# Patient Record
Sex: FEMALE | Race: WHITE
[De-identification: names, ages, dates, MRNs, and addresses within clinical notes are randomized per-mention and may not be internally consistent; named-entity substitution may affect disease eponyms.]

---

## 2017-03-08 ENCOUNTER — APPOINTMENT (OUTPATIENT)
Dept: FAMILY MEDICINE | Facility: CLINIC | Age: 67
End: 2017-03-08

## 2017-03-08 ENCOUNTER — NON-APPOINTMENT (OUTPATIENT)
Age: 67
End: 2017-03-08

## 2017-03-08 VITALS
DIASTOLIC BLOOD PRESSURE: 62 MMHG | BODY MASS INDEX: 19.97 KG/M2 | WEIGHT: 114.13 LBS | SYSTOLIC BLOOD PRESSURE: 150 MMHG | HEIGHT: 63.5 IN

## 2017-03-08 VITALS — HEART RATE: 72 BPM | RESPIRATION RATE: 16 BRPM | DIASTOLIC BLOOD PRESSURE: 80 MMHG | SYSTOLIC BLOOD PRESSURE: 140 MMHG

## 2017-03-08 DIAGNOSIS — Z83.79 FAMILY HISTORY OF OTHER DISEASES OF THE DIGESTIVE SYSTEM: ICD-10-CM

## 2017-03-08 DIAGNOSIS — Z82.0 FAMILY HISTORY OF EPILEPSY AND OTHER DISEASES OF THE NERVOUS SYSTEM: ICD-10-CM

## 2017-03-08 RX ORDER — IPRATROPIUM BROMIDE 21 UG/1
0.03 SPRAY NASAL
Qty: 30 | Refills: 0 | Status: COMPLETED | COMMUNITY
Start: 2017-02-13

## 2017-03-08 RX ORDER — AMOXICILLIN AND CLAVULANATE POTASSIUM 875; 125 MG/1; MG/1
875-125 TABLET, COATED ORAL
Qty: 20 | Refills: 0 | Status: COMPLETED | COMMUNITY
Start: 2016-10-10

## 2017-03-08 RX ORDER — PEN NEEDLE, DIABETIC 29 G X1/2"
29G X 12.7MM NEEDLE, DISPOSABLE MISCELLANEOUS
Qty: 500 | Refills: 0 | Status: ACTIVE | COMMUNITY
Start: 2016-12-01

## 2017-05-06 LAB — HEMOCCULT STL QL IA: NEGATIVE

## 2017-10-02 ENCOUNTER — APPOINTMENT (OUTPATIENT)
Dept: FAMILY MEDICINE | Facility: CLINIC | Age: 67
End: 2017-10-02
Payer: MEDICARE

## 2017-10-02 VITALS
BODY MASS INDEX: 20.34 KG/M2 | DIASTOLIC BLOOD PRESSURE: 70 MMHG | WEIGHT: 116.25 LBS | SYSTOLIC BLOOD PRESSURE: 144 MMHG | HEIGHT: 63.5 IN

## 2017-10-02 VITALS — DIASTOLIC BLOOD PRESSURE: 80 MMHG | RESPIRATION RATE: 16 BRPM | HEART RATE: 72 BPM | SYSTOLIC BLOOD PRESSURE: 140 MMHG

## 2017-10-02 DIAGNOSIS — T75.3XXA MOTION SICKNESS, INITIAL ENCOUNTER: ICD-10-CM

## 2017-10-02 PROCEDURE — 99214 OFFICE O/P EST MOD 30 MIN: CPT | Mod: 25

## 2017-10-02 PROCEDURE — 90732 PPSV23 VACC 2 YRS+ SUBQ/IM: CPT

## 2017-10-02 PROCEDURE — 90662 IIV NO PRSV INCREASED AG IM: CPT

## 2017-10-02 PROCEDURE — G0009: CPT

## 2017-10-02 PROCEDURE — G0008: CPT

## 2018-02-23 ENCOUNTER — APPOINTMENT (OUTPATIENT)
Dept: FAMILY MEDICINE | Facility: CLINIC | Age: 68
End: 2018-02-23
Payer: MEDICARE

## 2018-02-23 VITALS
DIASTOLIC BLOOD PRESSURE: 72 MMHG | SYSTOLIC BLOOD PRESSURE: 144 MMHG | HEIGHT: 63.5 IN | HEART RATE: 87 BPM | BODY MASS INDEX: 20.3 KG/M2 | TEMPERATURE: 97.8 F | OXYGEN SATURATION: 98 % | WEIGHT: 116 LBS | RESPIRATION RATE: 16 BRPM

## 2018-02-23 PROCEDURE — 99214 OFFICE O/P EST MOD 30 MIN: CPT

## 2018-04-16 ENCOUNTER — NON-APPOINTMENT (OUTPATIENT)
Age: 68
End: 2018-04-16

## 2018-04-16 ENCOUNTER — APPOINTMENT (OUTPATIENT)
Dept: FAMILY MEDICINE | Facility: CLINIC | Age: 68
End: 2018-04-16
Payer: MEDICARE

## 2018-04-16 VITALS
HEART RATE: 77 BPM | SYSTOLIC BLOOD PRESSURE: 150 MMHG | TEMPERATURE: 97.8 F | BODY MASS INDEX: 20.54 KG/M2 | DIASTOLIC BLOOD PRESSURE: 78 MMHG | WEIGHT: 117.38 LBS | OXYGEN SATURATION: 99 % | HEIGHT: 63.5 IN

## 2018-04-16 VITALS — SYSTOLIC BLOOD PRESSURE: 140 MMHG | RESPIRATION RATE: 1 BRPM | DIASTOLIC BLOOD PRESSURE: 80 MMHG | HEART RATE: 72 BPM

## 2018-04-16 PROCEDURE — 93000 ELECTROCARDIOGRAM COMPLETE: CPT | Mod: 59

## 2018-04-16 PROCEDURE — G0439: CPT

## 2018-04-16 RX ORDER — INSULIN DETEMIR 100 [IU]/ML
100 INJECTION, SOLUTION SUBCUTANEOUS
Qty: 15 | Refills: 0 | Status: COMPLETED | COMMUNITY
Start: 2017-07-10 | End: 2018-04-16

## 2018-04-16 RX ORDER — MECLIZINE HYDROCHLORIDE 25 MG/1
25 TABLET ORAL EVERY 6 HOURS
Qty: 30 | Refills: 1 | Status: COMPLETED | COMMUNITY
Start: 2017-10-02 | End: 2018-04-16

## 2018-08-15 ENCOUNTER — APPOINTMENT (OUTPATIENT)
Dept: FAMILY MEDICINE | Facility: CLINIC | Age: 68
End: 2018-08-15

## 2018-10-15 ENCOUNTER — MED ADMIN CHARGE (OUTPATIENT)
Age: 68
End: 2018-10-15

## 2018-10-15 ENCOUNTER — APPOINTMENT (OUTPATIENT)
Dept: FAMILY MEDICINE | Facility: CLINIC | Age: 68
End: 2018-10-15
Payer: MEDICARE

## 2018-10-15 PROCEDURE — G0008: CPT

## 2018-10-15 PROCEDURE — 90662 IIV NO PRSV INCREASED AG IM: CPT

## 2018-10-20 LAB — HEMOCCULT STL QL IA: NEGATIVE

## 2018-12-13 ENCOUNTER — CLINICAL ADVICE (OUTPATIENT)
Age: 68
End: 2018-12-13

## 2019-03-12 ENCOUNTER — FORM ENCOUNTER (OUTPATIENT)
Age: 69
End: 2019-03-12

## 2019-03-13 ENCOUNTER — APPOINTMENT (OUTPATIENT)
Dept: FAMILY MEDICINE | Facility: CLINIC | Age: 69
End: 2019-03-13
Payer: MEDICARE

## 2019-03-13 ENCOUNTER — OUTPATIENT (OUTPATIENT)
Dept: OUTPATIENT SERVICES | Facility: HOSPITAL | Age: 69
LOS: 1 days | End: 2019-03-13
Payer: MEDICARE

## 2019-03-13 ENCOUNTER — APPOINTMENT (OUTPATIENT)
Dept: RADIOLOGY | Facility: CLINIC | Age: 69
End: 2019-03-13
Payer: MEDICARE

## 2019-03-13 VITALS
HEIGHT: 63.5 IN | DIASTOLIC BLOOD PRESSURE: 78 MMHG | BODY MASS INDEX: 20.39 KG/M2 | WEIGHT: 116.5 LBS | OXYGEN SATURATION: 98 % | HEART RATE: 78 BPM | SYSTOLIC BLOOD PRESSURE: 140 MMHG

## 2019-03-13 DIAGNOSIS — S89.92XA UNSPECIFIED INJURY OF LEFT LOWER LEG, INITIAL ENCOUNTER: ICD-10-CM

## 2019-03-13 PROCEDURE — 99213 OFFICE O/P EST LOW 20 MIN: CPT

## 2019-03-13 PROCEDURE — 73562 X-RAY EXAM OF KNEE 3: CPT | Mod: 26,LT

## 2019-03-13 PROCEDURE — 73562 X-RAY EXAM OF KNEE 3: CPT

## 2019-03-13 NOTE — HISTORY OF PRESENT ILLNESS
[FreeTextEntry8] : left  knee  injury  pt  fell  5 days ago has  ms and also  injured  face and developed  left  knee  swelling and pain

## 2019-05-10 ENCOUNTER — RX RENEWAL (OUTPATIENT)
Age: 69
End: 2019-05-10

## 2019-05-20 ENCOUNTER — APPOINTMENT (OUTPATIENT)
Dept: FAMILY MEDICINE | Facility: CLINIC | Age: 69
End: 2019-05-20
Payer: MEDICARE

## 2019-05-20 VITALS
SYSTOLIC BLOOD PRESSURE: 132 MMHG | WEIGHT: 115 LBS | OXYGEN SATURATION: 98 % | BODY MASS INDEX: 20.12 KG/M2 | HEART RATE: 70 BPM | HEIGHT: 63.5 IN | DIASTOLIC BLOOD PRESSURE: 80 MMHG

## 2019-05-20 VITALS — HEART RATE: 72 BPM | SYSTOLIC BLOOD PRESSURE: 130 MMHG | DIASTOLIC BLOOD PRESSURE: 70 MMHG | RESPIRATION RATE: 16 BRPM

## 2019-05-20 PROCEDURE — 99213 OFFICE O/P EST LOW 20 MIN: CPT | Mod: 25

## 2019-05-20 PROCEDURE — G0439: CPT

## 2019-05-20 PROCEDURE — G0444 DEPRESSION SCREEN ANNUAL: CPT | Mod: 59

## 2019-05-20 RX ORDER — INTERFERON BETA-1A 30 MCG
KIT INTRAMUSCULAR
Refills: 0 | Status: COMPLETED | COMMUNITY
End: 2019-05-20

## 2019-05-20 NOTE — HISTORY OF PRESENT ILLNESS
[FreeTextEntry1] : pt here  for  cpe and  management of MS DM HTN HLD  [de-identified] : ms  getting worse has  weakness in left  leg  going  to  rehab SEEING  NEW  NEURO HEADACHES  HELPED WITH FIORINAL AND SLEEPS WELL WITH AMYTRIPTYLLINE   C/O  NECK PAIN

## 2019-05-20 NOTE — PHYSICAL EXAM
[No Acute Distress] : no acute distress [Normal Voice/Communication] : normal voice/communication [PERRL] : pupils equal round and reactive to light [Normal Oropharynx] : the oropharynx was normal [Supple] : supple [Clear to Auscultation] : lungs were clear to auscultation bilaterally [Regular Rhythm] : with a regular rhythm [No Edema] : there was no peripheral edema [Soft] : abdomen soft [Non Tender] : non-tender [Non-distended] : non-distended [No HSM] : no HSM [No Rash] : no rash [Normal Insight/Judgement] : insight and judgment were intact [No Masses] : no palpable masses [No Nipple Discharge] : no nipple discharge [de-identified] : NECK  DECREASED ROM

## 2019-05-20 NOTE — REVIEW OF SYSTEMS
[Hearing Loss] : hearing loss [Fever] : no fever [Pain] : no pain [Itching] : no itching [Chest Pain] : no chest pain [Orthopnea] : no orthopnea [Shortness Of Breath] : no shortness of breath [Wheezing] : no wheezing [Abdominal Pain] : no abdominal pain [Nausea] : no nausea [Constipation] : no constipation [Incontinence] : no incontinence [Swollen Glands] : no swollen glands

## 2019-05-20 NOTE — HEALTH RISK ASSESSMENT
[Fair] :  ~his/her~ mood as fair [One fall no injury in past year] : Patient reported one fall in the past year without injury [0] : 2) Feeling down, depressed, or hopeless: Not at all (0) [None] : None [With Significant Other] : lives with significant other [College] : College [] :  [Fully functional (bathing, dressing, toileting, transferring, walking, feeding)] : Fully functional (bathing, dressing, toileting, transferring, walking, feeding) [Fully functional (using the telephone, shopping, preparing meals, housekeeping, doing laundry, using] : Fully functional and needs no help or supervision to perform IADLs (using the telephone, shopping, preparing meals, housekeeping, doing laundry, using transportation, managing medications and managing finances) [Reports changes in hearing] : Reports changes in hearing [Smoke Detector] : smoke detector [Carbon Monoxide Detector] : carbon monoxide detector [Seat Belt] :  uses seat belt [With Patient/Caregiver] : With Patient/Caregiver [] : No [de-identified] : ENDO AND  NEURO  [de-identified] : NO  [[id number]] : 0 [Change in mental status noted] : No change in mental status noted [Reports changes in vision] : Reports no changes in vision [Reports changes in dental health] : Reports no changes in dental health [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

## 2019-05-23 ENCOUNTER — FORM ENCOUNTER (OUTPATIENT)
Age: 69
End: 2019-05-23

## 2019-05-23 DIAGNOSIS — M81.0 AGE-RELATED OSTEOPOROSIS W/OUT CURRENT PATHOLOGICAL FRACTURE: ICD-10-CM

## 2019-05-24 ENCOUNTER — APPOINTMENT (OUTPATIENT)
Dept: RADIOLOGY | Facility: CLINIC | Age: 69
End: 2019-05-24
Payer: MEDICARE

## 2019-05-24 ENCOUNTER — OUTPATIENT (OUTPATIENT)
Dept: OUTPATIENT SERVICES | Facility: HOSPITAL | Age: 69
LOS: 1 days | End: 2019-05-24
Payer: MEDICARE

## 2019-05-24 DIAGNOSIS — Z00.8 ENCOUNTER FOR OTHER GENERAL EXAMINATION: ICD-10-CM

## 2019-05-24 PROCEDURE — 77080 DXA BONE DENSITY AXIAL: CPT | Mod: 26

## 2019-05-24 PROCEDURE — 77080 DXA BONE DENSITY AXIAL: CPT

## 2019-05-27 PROBLEM — M81.0 OSTEOPOROSIS: Status: ACTIVE | Noted: 2019-05-27

## 2019-07-24 ENCOUNTER — RX RENEWAL (OUTPATIENT)
Age: 69
End: 2019-07-24

## 2019-08-05 ENCOUNTER — RX RENEWAL (OUTPATIENT)
Age: 69
End: 2019-08-05

## 2019-09-12 ENCOUNTER — RX RENEWAL (OUTPATIENT)
Age: 69
End: 2019-09-12

## 2019-10-28 ENCOUNTER — RX RENEWAL (OUTPATIENT)
Age: 69
End: 2019-10-28

## 2019-11-05 ENCOUNTER — RX RENEWAL (OUTPATIENT)
Age: 69
End: 2019-11-05

## 2019-11-06 ENCOUNTER — RX RENEWAL (OUTPATIENT)
Age: 69
End: 2019-11-06

## 2019-11-27 ENCOUNTER — RX RENEWAL (OUTPATIENT)
Age: 69
End: 2019-11-27

## 2019-12-16 ENCOUNTER — RX RENEWAL (OUTPATIENT)
Age: 69
End: 2019-12-16

## 2019-12-30 ENCOUNTER — APPOINTMENT (OUTPATIENT)
Dept: NEUROLOGY | Facility: CLINIC | Age: 69
End: 2019-12-30
Payer: MEDICARE

## 2019-12-30 VITALS
DIASTOLIC BLOOD PRESSURE: 67 MMHG | HEIGHT: 63.5 IN | HEART RATE: 80 BPM | SYSTOLIC BLOOD PRESSURE: 160 MMHG | WEIGHT: 115 LBS | BODY MASS INDEX: 20.12 KG/M2

## 2019-12-30 PROCEDURE — 99205 OFFICE O/P NEW HI 60 MIN: CPT

## 2019-12-30 NOTE — HISTORY OF PRESENT ILLNESS
[FreeTextEntry1] : Reason for consult: MS\par \par HPI: TERESA MCHUGH is a 69 year old woman \par \par 1980s - diagnosed with MS based on L optic neuritis, which improved.\par 1990s - had a 2nd "attack" involving left leg weakness and gait impairment. Saw Dr. Martines, got IVMP and improved but with chronic LLE weakness. Then took avonex for 20 years. \par 3/2019 - developed "reaction" to avonex which made her LLE weaker for several days. Then recurred when she tried again, so she stopped it.\par Then stopped seeing Dr. Martines.\par Then started seeing [facility] for PT and referred to Dr. Martell.\par LLE has become gradually heavier and tighter over past several months. \par Started using cane intermittently in late 2019.\par Last MRIs in 6/2019 which reportedly were unchanged.\par Tried ampyra in late 2019 and did not tolerate it.\par \par ROS/Current Sx:\par 1 recent fall - walking up incline, walks with walker.\par no BB dysfunction\par \par PMHX:\par DM1\par MS\par \par MEDS:\par ramipril\par elavil \par amlodipine\par insulin\par butalbital 1-2x/wk\par  \par ALL: nkda\par \par SHx: no tob, no etoh. \par \par FHx: mother with seizures. no MS.\par \par Vitals: hypertensive, i advised her to discuss with her pmd.\par \par Exam:\par \par AO3.  Normally conversant.  Follows commands, names, and repeats.  Good attention.\par \par PERRL, VFF, EOMI, no nystagmus, face symmetric, TUP at midline.\par \par Motor: \par                                                 R:                               L:\par Del                                           5                                5\par Bi                                              5                               5\par Tri                                            5                               5\par Wrist Extensors                      5                               5-\par Finger abductors                    5                               5-\par                                         5                               5- \par \par HF                                           4++                              4+\par KE                                           5                               5\par KF                                           5                               5-\par DF                                           4+ (dec ROM)                              4 (dec ROM)\par PF                                           5                               5\par \par Tone                                       R                               L\par UE                                          0                                0 \par LE                                          1                                3\par \par Sensory                                RUE                      LUE                 RLE                LLE     \par LT                                           +                            +                      +                   +\par Vib                                          +                            +                     abs                   abs\par JPS                                       \par PP                                         +                            +                      +                   +\par Temp                                     +                            +                      +                   +\par \par Reflexes:\par                                              R                             L                            \par Biceps                                  3                           3\par BR                                        3                           3\par Triceps                               \par Pat                                        3                        3+\par AJ                                        3+                           3\par \par TOES                                   tonic E                           tonic E\par \par \par Coordination:\par                                              R                             L                       \par FTN                                       0                             0 \par CHAYA                                      0                            0\par HTS                                      0                             0 \par \par Other                                                                          \par  \par \par Gait: mildly wide based, ataxic, drags the LLE a bit. very poor heel elevation BL.\par \par                     Assistance: tested without.\par \par MRI brain 6/2019 c/w 7/2017 - typical appearing lesions (PV, DISHA).\par \par MRI C+T 6/2019 - multilevel t2h.\par \par \par AP: 70yo with SPMS, progressing over past 1 year.\par \par Discussed aubagio and ocrevus, risks/benefits of each including infectious and neoplastic risk. Discussed mayzent but not the best option given uncontrolled DM.\par \par - trial of aubagio\par - consider IVMP but given uncontrolled DM, will hold off.\par \par Sx:\par - baclofen 5tid, inc to 10mg tid as tolerated for LLE spasticity.\par \par - RTC 1m after starting aubagio\par \par

## 2020-01-02 LAB
25(OH)D3 SERPL-MCNC: 28 NG/ML
ALBUMIN SERPL ELPH-MCNC: 4.3 G/DL
ALP BLD-CCNC: 90 U/L
ALT SERPL-CCNC: 14 U/L
AST SERPL-CCNC: 15 U/L
BASOPHILS # BLD AUTO: 0.04 K/UL
BASOPHILS NFR BLD AUTO: 0.6 %
BILIRUB DIRECT SERPL-MCNC: 0.1 MG/DL
BILIRUB INDIRECT SERPL-MCNC: 0.1 MG/DL
BILIRUB SERPL-MCNC: 0.2 MG/DL
EOSINOPHIL # BLD AUTO: 0.44 K/UL
EOSINOPHIL NFR BLD AUTO: 6.7 %
HCT VFR BLD CALC: 40.7 %
HGB BLD-MCNC: 13.3 G/DL
IMM GRANULOCYTES NFR BLD AUTO: 0.3 %
LYMPHOCYTES # BLD AUTO: 1.95 K/UL
LYMPHOCYTES NFR BLD AUTO: 29.6 %
M TB IFN-G BLD-IMP: NEGATIVE
MAN DIFF?: NORMAL
MCHC RBC-ENTMCNC: 29.8 PG
MCHC RBC-ENTMCNC: 32.7 GM/DL
MCV RBC AUTO: 91.1 FL
MONOCYTES # BLD AUTO: 0.42 K/UL
MONOCYTES NFR BLD AUTO: 6.4 %
NEUTROPHILS # BLD AUTO: 3.71 K/UL
NEUTROPHILS NFR BLD AUTO: 56.4 %
PLATELET # BLD AUTO: 226 K/UL
PROT SERPL-MCNC: 6.5 G/DL
QUANTIFERON TB PLUS MITOGEN MINUS NIL: >10 IU/ML
QUANTIFERON TB PLUS NIL: 0.01 IU/ML
QUANTIFERON TB PLUS TB1 MINUS NIL: 0 IU/ML
QUANTIFERON TB PLUS TB2 MINUS NIL: 0 IU/ML
RBC # BLD: 4.47 M/UL
RBC # FLD: 12.2 %
WBC # FLD AUTO: 6.58 K/UL

## 2020-01-17 ENCOUNTER — RESULT REVIEW (OUTPATIENT)
Age: 70
End: 2020-01-17

## 2020-01-28 ENCOUNTER — RX RENEWAL (OUTPATIENT)
Age: 70
End: 2020-01-28

## 2020-02-05 ENCOUNTER — RX RENEWAL (OUTPATIENT)
Age: 70
End: 2020-02-05

## 2020-02-12 ENCOUNTER — APPOINTMENT (OUTPATIENT)
Dept: FAMILY MEDICINE | Facility: CLINIC | Age: 70
End: 2020-02-12
Payer: MEDICARE

## 2020-02-12 VITALS — SYSTOLIC BLOOD PRESSURE: 130 MMHG | HEART RATE: 72 BPM | RESPIRATION RATE: 16 BRPM | DIASTOLIC BLOOD PRESSURE: 70 MMHG

## 2020-02-12 VITALS
DIASTOLIC BLOOD PRESSURE: 68 MMHG | BODY MASS INDEX: 20.54 KG/M2 | HEART RATE: 84 BPM | OXYGEN SATURATION: 99 % | SYSTOLIC BLOOD PRESSURE: 132 MMHG | WEIGHT: 117.38 LBS | HEIGHT: 63.5 IN

## 2020-02-12 PROCEDURE — 99214 OFFICE O/P EST MOD 30 MIN: CPT

## 2020-02-12 RX ORDER — INSULIN DETEMIR 100 [IU]/ML
100 INJECTION, SOLUTION SUBCUTANEOUS TWICE DAILY
Qty: 1 | Refills: 0 | Status: COMPLETED | COMMUNITY
End: 2020-02-12

## 2020-02-12 NOTE — PHYSICAL EXAM
[No Acute Distress] : no acute distress [PERRL] : pupils equal round and reactive to light [Normal Oropharynx] : the oropharynx was normal [Supple] : supple [Clear to Auscultation] : lungs were clear to auscultation bilaterally [Regular Rhythm] : with a regular rhythm [No Spinal Tenderness] : no spinal tenderness [No Focal Deficits] : no focal deficits [Normal] : no rash [Normal Insight/Judgement] : insight and judgment were intact [Left Foot Was Examined] : left foot ~C was examined [] : normal [de-identified] : nail normal  [de-identified] : occ ectopy  [TWNoteComboBox4] : 0 [TWNoteComboBox3] : 0

## 2020-02-12 NOTE — HISTORY OF PRESENT ILLNESS
[FreeTextEntry6] : diabetes  is poorly  controlled  may go on pump last a1c  8  Sees Dr Kulkarni  also saw podiatry did cesilia that was 63 and referred to vascular surgery  pt needs refill on fioricet  2 x  awk has  been taking for years

## 2020-02-13 LAB
ALBUMIN SERPL ELPH-MCNC: 4.4 G/DL
ALP BLD-CCNC: 88 U/L
ALT SERPL-CCNC: 13 U/L
ANION GAP SERPL CALC-SCNC: 9 MMOL/L
AST SERPL-CCNC: 15 U/L
BASOPHILS # BLD AUTO: 0.04 K/UL
BASOPHILS NFR BLD AUTO: 0.5 %
BILIRUB SERPL-MCNC: 0.2 MG/DL
BUN SERPL-MCNC: 17 MG/DL
CALCIUM SERPL-MCNC: 10.1 MG/DL
CHLORIDE SERPL-SCNC: 98 MMOL/L
CHOLEST SERPL-MCNC: 212 MG/DL
CHOLEST/HDLC SERPL: 2.6 RATIO
CO2 SERPL-SCNC: 33 MMOL/L
CREAT SERPL-MCNC: 0.53 MG/DL
EOSINOPHIL # BLD AUTO: 0.42 K/UL
EOSINOPHIL NFR BLD AUTO: 4.9 %
ESTIMATED AVERAGE GLUCOSE: 189 MG/DL
GLUCOSE SERPL-MCNC: 213 MG/DL
HBA1C MFR BLD HPLC: 8.2 %
HCT VFR BLD CALC: 42.4 %
HDLC SERPL-MCNC: 80 MG/DL
HGB BLD-MCNC: 13.3 G/DL
IMM GRANULOCYTES NFR BLD AUTO: 0.2 %
LDLC SERPL CALC-MCNC: 113 MG/DL
LYMPHOCYTES # BLD AUTO: 1.93 K/UL
LYMPHOCYTES NFR BLD AUTO: 22.7 %
MAN DIFF?: NORMAL
MCHC RBC-ENTMCNC: 28.8 PG
MCHC RBC-ENTMCNC: 31.4 GM/DL
MCV RBC AUTO: 91.8 FL
MONOCYTES # BLD AUTO: 0.6 K/UL
MONOCYTES NFR BLD AUTO: 7 %
NEUTROPHILS # BLD AUTO: 5.51 K/UL
NEUTROPHILS NFR BLD AUTO: 64.7 %
PLATELET # BLD AUTO: 255 K/UL
POTASSIUM SERPL-SCNC: 4.5 MMOL/L
PROT SERPL-MCNC: 6.4 G/DL
RBC # BLD: 4.62 M/UL
RBC # FLD: 12.1 %
SODIUM SERPL-SCNC: 139 MMOL/L
T4 SERPL-MCNC: 5.9 UG/DL
TRIGL SERPL-MCNC: 95 MG/DL
TSH SERPL-ACNC: 1.61 UIU/ML
URATE SERPL-MCNC: 2.7 MG/DL
WBC # FLD AUTO: 8.52 K/UL

## 2020-02-20 ENCOUNTER — RESULT REVIEW (OUTPATIENT)
Age: 70
End: 2020-02-20

## 2020-02-20 ENCOUNTER — RX RENEWAL (OUTPATIENT)
Age: 70
End: 2020-02-20

## 2020-03-19 ENCOUNTER — RX RENEWAL (OUTPATIENT)
Age: 70
End: 2020-03-19

## 2020-03-25 ENCOUNTER — APPOINTMENT (OUTPATIENT)
Dept: NEUROLOGY | Facility: CLINIC | Age: 70
End: 2020-03-25

## 2020-04-01 ENCOUNTER — APPOINTMENT (OUTPATIENT)
Dept: FAMILY MEDICINE | Facility: CLINIC | Age: 70
End: 2020-04-01

## 2020-06-16 ENCOUNTER — RESULT CHARGE (OUTPATIENT)
Age: 70
End: 2020-06-16

## 2020-06-17 ENCOUNTER — APPOINTMENT (OUTPATIENT)
Dept: FAMILY MEDICINE | Facility: CLINIC | Age: 70
End: 2020-06-17
Payer: MEDICARE

## 2020-06-17 ENCOUNTER — NON-APPOINTMENT (OUTPATIENT)
Age: 70
End: 2020-06-17

## 2020-06-17 VITALS
WEIGHT: 117 LBS | HEART RATE: 79 BPM | RESPIRATION RATE: 16 BRPM | OXYGEN SATURATION: 98 % | SYSTOLIC BLOOD PRESSURE: 138 MMHG | BODY MASS INDEX: 20.47 KG/M2 | DIASTOLIC BLOOD PRESSURE: 78 MMHG | HEIGHT: 63.5 IN

## 2020-06-17 VITALS
SYSTOLIC BLOOD PRESSURE: 130 MMHG | BODY MASS INDEX: 20.47 KG/M2 | HEART RATE: 79 BPM | HEIGHT: 63.5 IN | DIASTOLIC BLOOD PRESSURE: 78 MMHG | WEIGHT: 117 LBS | RESPIRATION RATE: 16 BRPM | OXYGEN SATURATION: 98 %

## 2020-06-17 DIAGNOSIS — Z78.9 OTHER SPECIFIED HEALTH STATUS: ICD-10-CM

## 2020-06-17 DIAGNOSIS — Z12.39 ENCOUNTER FOR OTHER SCREENING FOR MALIGNANT NEOPLASM OF BREAST: ICD-10-CM

## 2020-06-17 PROCEDURE — 93000 ELECTROCARDIOGRAM COMPLETE: CPT | Mod: 59

## 2020-06-17 PROCEDURE — G0439: CPT

## 2020-06-17 RX ORDER — IBANDRONATE SODIUM 150 MG/1
150 TABLET ORAL
Qty: 3 | Refills: 1 | Status: DISCONTINUED | COMMUNITY
Start: 2019-05-27 | End: 2020-06-17

## 2020-06-17 RX ORDER — ZOSTER VACCINE RECOMBINANT, ADJUVANTED 50 MCG/0.5
50 KIT INTRAMUSCULAR
Qty: 1 | Refills: 1 | Status: DISCONTINUED | COMMUNITY
Start: 2018-10-15 | End: 2020-06-17

## 2020-06-17 RX ORDER — TERIFLUNOMIDE 14 MG/1
14 TABLET, FILM COATED ORAL
Qty: 30 | Refills: 5 | Status: DISCONTINUED | COMMUNITY
Start: 2020-03-03 | End: 2020-06-17

## 2020-06-17 RX ORDER — METHIMAZOLE 5 MG/1
5 TABLET ORAL
Qty: 45 | Refills: 0 | Status: DISCONTINUED | COMMUNITY
Start: 2017-03-06 | End: 2020-06-17

## 2020-06-17 RX ORDER — BACLOFEN 10 MG/1
10 TABLET ORAL 3 TIMES DAILY
Qty: 270 | Refills: 3 | Status: DISCONTINUED | COMMUNITY
Start: 2019-12-30 | End: 2020-06-17

## 2020-06-19 LAB
APPEARANCE: ABNORMAL
BACTERIA: NEGATIVE
BILIRUBIN URINE: NEGATIVE
BLOOD URINE: NEGATIVE
COLOR: YELLOW
CREAT SPEC-SCNC: 79 MG/DL
GLUCOSE QUALITATIVE U: NEGATIVE
HYALINE CASTS: 1 /LPF
KETONES URINE: NEGATIVE
LEUKOCYTE ESTERASE URINE: ABNORMAL
MICROALBUMIN 24H UR DL<=1MG/L-MCNC: 1.4 MG/DL
MICROALBUMIN/CREAT 24H UR-RTO: 18 MG/G
MICROSCOPIC-UA: NORMAL
NITRITE URINE: NEGATIVE
PH URINE: 7
PROTEIN URINE: NEGATIVE
RED BLOOD CELLS URINE: 2 /HPF
SPECIFIC GRAVITY URINE: 1.01
SQUAMOUS EPITHELIAL CELLS: 2 /HPF
URINE COMMENTS: NORMAL
UROBILINOGEN URINE: NORMAL
WHITE BLOOD CELLS URINE: 12 /HPF

## 2020-06-22 RX ORDER — INSULIN ASPART 100 [IU]/ML
100 INJECTION, SOLUTION INTRAVENOUS; SUBCUTANEOUS
Qty: 30 | Refills: 0 | Status: DISCONTINUED | COMMUNITY
Start: 2016-11-30 | End: 2020-06-22

## 2020-06-22 NOTE — HEALTH RISK ASSESSMENT
[Yes] : Yes [Monthly or less (1 pt)] : Monthly or less (1 point) [1 or 2 (0 pts)] : 1 or 2 (0 points) [Never (0 pts)] : Never (0 points) [No] : In the past 12 months have you used drugs other than those required for medical reasons? No [One fall no injury in past year] : Patient reported one fall in the past year without injury [0] : 2) Feeling down, depressed, or hopeless: Not at all (0) [Patient reported colonoscopy was normal] : Patient reported colonoscopy was normal [With Significant Other] : lives with significant other [Retired] : retired [# Of Children ___] : has [unfilled] children [Feels Safe at Home] : Feels safe at home [] :  [Independent] : using transportation [Some assistance needed] : shopping [Full assistance needed] : housekeeping [Reports changes in hearing] : Reports changes in hearing [Smoke Detector] : smoke detector [Reports changes in vision] : Reports changes in vision [Sunscreen] : uses sunscreen [Carbon Monoxide Detector] : carbon monoxide detector [Seat Belt] :  uses seat belt [With Patient/Caregiver] : With Patient/Caregiver [Designated Healthcare Proxy] : Designated healthcare proxy [Relationship: ___] : Relationship: [unfilled] [] : No [de-identified] : rare alcohol use [Audit-CScore] : 1 [de-identified] : diet is generally good [de-identified] : limited- goes for physical therapy  [Change in mental status noted] : No change in mental status noted [MammogramDate] : 06/19 [BoneDensityDate] : 05/19 [BoneDensityComments] : Osteopenia  [ColonoscopyDate] : 2015 [ColonoscopyComments] : Dr. Reynolds  [de-identified] : has hearing aids [de-identified] : wears glasses [FreeTextEntry8] : uses cane [AdvancecareDate] : 06/2020

## 2020-06-22 NOTE — PHYSICAL EXAM
[No Acute Distress] : no acute distress [Well Nourished] : well nourished [Well Developed] : well developed [Normal Sclera/Conjunctiva] : normal sclera/conjunctiva [Normal Oropharynx] : the oropharynx was normal [Normal Appearance] : was normal in appearance [PERRL] : pupils equal round and reactive to light [No Respiratory Distress] : no respiratory distress  [Neck Supple] : was supple [Clear to Auscultation] : lungs were clear to auscultation bilaterally [Regular Rhythm] : with a regular rhythm [Normal Rate] : normal rate  [Normal S1, S2] : normal S1 and S2 [No Murmur] : no murmur heard [No Carotid Bruits] : no carotid bruits [Soft] : abdomen soft [Non Tender] : non-tender [No Edema] : there was no peripheral edema [Normal Posterior Cervical Nodes] : no posterior cervical lymphadenopathy [Normal Anterior Cervical Nodes] : no anterior cervical lymphadenopathy [Normal Bowel Sounds] : normal bowel sounds [Alert and Oriented x3] : oriented to person, place, and time [No Spinal Tenderness] : no spinal tenderness [No Rash] : no rash [de-identified] : hearing aids [de-identified] : decreased bilateral lower extremity strength [de-identified] : unsteady gait

## 2020-06-22 NOTE — ASSESSMENT
[FreeTextEntry1] : reports recent blood work done recently with endocrinology \par follow up with optometry/ophthalmology and dentist for routine exams (when due and able to go)\par go for mammogram \par follow up with GYN\par up to date with colonoscopy- f/u with GI when due for repeat colonoscopy \par up to date with Pneumovax\par reports up to date with Shingriix vaccinations \par \par Diabetes- c/w management as per endocrinology \par \par HTN- stable, c/w Amlodipine, Ramipril \par \par MS- c/w management as per neurology \par

## 2020-06-22 NOTE — HISTORY OF PRESENT ILLNESS
[de-identified] : \par 69 year old female presents for annual physical. \par \par Has MS for over 20 years\par follows with Dr. Serrano- neurology\par gait has been worsening- goes for physical therapy\par \par Type 1 Diabetic- follows with endocrinology, Dr. Kulkarni \par on Tresiba daily, Novolog \par follows with podiatry\par follows with ophthalmology- Dr. Ling \par \par has Osteopenia\par \par has thyroid nodules- had biopsy done, due for follow up ultrasound next month\par \par reports receiving Shingrix vaccinations at [facility] [FreeTextEntry1] : Annual physical

## 2020-06-23 ENCOUNTER — APPOINTMENT (OUTPATIENT)
Dept: VASCULAR SURGERY | Facility: CLINIC | Age: 70
End: 2020-06-23

## 2020-06-24 ENCOUNTER — INPATIENT (INPATIENT)
Facility: HOSPITAL | Age: 70
LOS: 1 days | Discharge: HOME | End: 2020-06-26
Payer: MEDICARE

## 2020-06-24 PROCEDURE — 99285 EMERGENCY DEPT VISIT HI MDM: CPT | Mod: CS

## 2020-06-24 PROCEDURE — 71045 X-RAY EXAM CHEST 1 VIEW: CPT | Mod: 26

## 2020-07-08 ENCOUNTER — APPOINTMENT (OUTPATIENT)
Dept: FAMILY MEDICINE | Facility: CLINIC | Age: 70
End: 2020-07-08
Payer: MEDICARE

## 2020-07-08 VITALS
WEIGHT: 118.13 LBS | DIASTOLIC BLOOD PRESSURE: 80 MMHG | OXYGEN SATURATION: 96 % | HEART RATE: 74 BPM | BODY MASS INDEX: 20.93 KG/M2 | HEIGHT: 63 IN | SYSTOLIC BLOOD PRESSURE: 138 MMHG

## 2020-07-08 VITALS — HEART RATE: 72 BPM | SYSTOLIC BLOOD PRESSURE: 154 MMHG | DIASTOLIC BLOOD PRESSURE: 80 MMHG | RESPIRATION RATE: 16 BRPM

## 2020-07-08 DIAGNOSIS — E04.1 NONTOXIC SINGLE THYROID NODULE: ICD-10-CM

## 2020-07-08 PROCEDURE — 99214 OFFICE O/P EST MOD 30 MIN: CPT | Mod: 25

## 2020-07-08 PROCEDURE — 36415 COLL VENOUS BLD VENIPUNCTURE: CPT

## 2020-07-08 NOTE — HISTORY OF PRESENT ILLNESS
[FreeTextEntry1] : U  [de-identified] : WAS  ADMITTED  TO [facility] FOR 3  DAYS  OF HIGH DOES  STEROID FOR MS  STEROID  DID NOT HELP GLUCOSE HAS  BEEN ELEVATED  ALSO LOOSING HAIR  SSEING  GARCES FOR  THYROID ATYPIA

## 2020-07-08 NOTE — ASSESSMENT
[FreeTextEntry1] : MS  NOT  IN REMISSION REFER  TO  [facility] REFERRAL LINE  FOR NEURO DM CHECK LABS  BP ACCEPTABLE THYROID   DISORDER F/U  Sammy

## 2020-07-08 NOTE — REVIEW OF SYSTEMS
[Muscle Weakness] : muscle weakness [Hair Changes] : hair changes [Fever] : no fever [Constipation] : no constipation [Diarrhea] : diarrhea [FreeTextEntry9] : LEFT  LEG  WEAKNESS

## 2020-07-08 NOTE — PHYSICAL EXAM
[PERRL] : pupils equal round and reactive to light [No Acute Distress] : no acute distress [Normal TMs] : both tympanic membranes were normal [Clear to Auscultation] : lungs were clear to auscultation bilaterally [Regular Rhythm] : with a regular rhythm [Supple] : supple [Normal] : no posterior cervical lymphadenopathy and no anterior cervical lymphadenopathy [No Rash] : no rash [de-identified] : LEFT LEG WEAKNESS  [de-identified] : SHUFFLING  WIDE BASED GAIT

## 2020-07-09 LAB
ALBUMIN SERPL ELPH-MCNC: 4.8 G/DL
ALP BLD-CCNC: 100 U/L
ALT SERPL-CCNC: 13 U/L
ANION GAP SERPL CALC-SCNC: 13 MMOL/L
APPEARANCE: CLEAR
AST SERPL-CCNC: 17 U/L
BACTERIA: NEGATIVE
BASOPHILS # BLD AUTO: 0.05 K/UL
BASOPHILS NFR BLD AUTO: 0.5 %
BILIRUB SERPL-MCNC: 0.2 MG/DL
BILIRUBIN URINE: NEGATIVE
BLOOD URINE: NEGATIVE
BUN SERPL-MCNC: 14 MG/DL
CALCIUM SERPL-MCNC: 9.8 MG/DL
CHLORIDE SERPL-SCNC: 98 MMOL/L
CHOLEST SERPL-MCNC: 204 MG/DL
CHOLEST/HDLC SERPL: 2.4 RATIO
CO2 SERPL-SCNC: 31 MMOL/L
COLOR: COLORLESS
CREAT SERPL-MCNC: 0.56 MG/DL
CREAT SPEC-SCNC: 13 MG/DL
EOSINOPHIL # BLD AUTO: 0.32 K/UL
EOSINOPHIL NFR BLD AUTO: 3.2 %
ESTIMATED AVERAGE GLUCOSE: 177 MG/DL
GLUCOSE QUALITATIVE U: NEGATIVE
GLUCOSE SERPL-MCNC: 196 MG/DL
HBA1C MFR BLD HPLC: 7.8 %
HCT VFR BLD CALC: 45.8 %
HDLC SERPL-MCNC: 84 MG/DL
HGB BLD-MCNC: 14.2 G/DL
HYALINE CASTS: 1 /LPF
IMM GRANULOCYTES NFR BLD AUTO: 0.1 %
KETONES URINE: NEGATIVE
LDLC SERPL CALC-MCNC: 96 MG/DL
LEUKOCYTE ESTERASE URINE: NEGATIVE
LYMPHOCYTES # BLD AUTO: 2.2 K/UL
LYMPHOCYTES NFR BLD AUTO: 22 %
MAN DIFF?: NORMAL
MCHC RBC-ENTMCNC: 29.3 PG
MCHC RBC-ENTMCNC: 31 GM/DL
MCV RBC AUTO: 94.6 FL
MICROALBUMIN 24H UR DL<=1MG/L-MCNC: <1.2 MG/DL
MICROALBUMIN/CREAT 24H UR-RTO: NORMAL MG/G
MICROSCOPIC-UA: NORMAL
MONOCYTES # BLD AUTO: 0.81 K/UL
MONOCYTES NFR BLD AUTO: 8.1 %
NEUTROPHILS # BLD AUTO: 6.59 K/UL
NEUTROPHILS NFR BLD AUTO: 66.1 %
NITRITE URINE: NEGATIVE
PH URINE: 7
PLATELET # BLD AUTO: 264 K/UL
POTASSIUM SERPL-SCNC: 4.7 MMOL/L
PROT SERPL-MCNC: 7 G/DL
PROTEIN URINE: NEGATIVE
RBC # BLD: 4.84 M/UL
RBC # FLD: 13 %
RED BLOOD CELLS URINE: 1 /HPF
SODIUM SERPL-SCNC: 141 MMOL/L
SPECIFIC GRAVITY URINE: 1.01
SQUAMOUS EPITHELIAL CELLS: 1 /HPF
T4 SERPL-MCNC: 6.3 UG/DL
TRIGL SERPL-MCNC: 123 MG/DL
TSH SERPL-ACNC: 1.89 UIU/ML
URATE SERPL-MCNC: 3 MG/DL
UROBILINOGEN URINE: NORMAL
WBC # FLD AUTO: 9.98 K/UL
WHITE BLOOD CELLS URINE: 1 /HPF

## 2020-07-31 ENCOUNTER — APPOINTMENT (OUTPATIENT)
Dept: NEUROLOGY | Facility: CLINIC | Age: 70
End: 2020-07-31

## 2020-08-11 ENCOUNTER — RX RENEWAL (OUTPATIENT)
Age: 70
End: 2020-08-11

## 2020-10-05 ENCOUNTER — MED ADMIN CHARGE (OUTPATIENT)
Age: 70
End: 2020-10-05

## 2020-10-05 ENCOUNTER — APPOINTMENT (OUTPATIENT)
Dept: FAMILY MEDICINE | Facility: CLINIC | Age: 70
End: 2020-10-05
Payer: MEDICARE

## 2020-10-05 PROCEDURE — 90662 IIV NO PRSV INCREASED AG IM: CPT

## 2020-10-05 PROCEDURE — G0008: CPT

## 2020-10-21 ENCOUNTER — RX RENEWAL (OUTPATIENT)
Age: 70
End: 2020-10-21

## 2020-12-07 ENCOUNTER — RX RENEWAL (OUTPATIENT)
Age: 70
End: 2020-12-07

## 2020-12-23 ENCOUNTER — RX RENEWAL (OUTPATIENT)
Age: 70
End: 2020-12-23

## 2021-02-06 ENCOUNTER — RX RENEWAL (OUTPATIENT)
Age: 71
End: 2021-02-06

## 2021-03-15 ENCOUNTER — RX RENEWAL (OUTPATIENT)
Age: 71
End: 2021-03-15

## 2021-03-26 ENCOUNTER — RX RENEWAL (OUTPATIENT)
Age: 71
End: 2021-03-26

## 2021-04-07 ENCOUNTER — LABORATORY RESULT (OUTPATIENT)
Age: 71
End: 2021-04-07

## 2021-04-07 ENCOUNTER — APPOINTMENT (OUTPATIENT)
Dept: FAMILY MEDICINE | Facility: CLINIC | Age: 71
End: 2021-04-07
Payer: MEDICARE

## 2021-04-07 VITALS — SYSTOLIC BLOOD PRESSURE: 130 MMHG | HEART RATE: 76 BPM | RESPIRATION RATE: 16 BRPM | DIASTOLIC BLOOD PRESSURE: 80 MMHG

## 2021-04-07 VITALS
BODY MASS INDEX: 20.91 KG/M2 | TEMPERATURE: 96.9 F | SYSTOLIC BLOOD PRESSURE: 136 MMHG | HEIGHT: 63 IN | HEART RATE: 82 BPM | DIASTOLIC BLOOD PRESSURE: 80 MMHG | OXYGEN SATURATION: 98 % | WEIGHT: 118 LBS

## 2021-04-07 DIAGNOSIS — R23.1 PALLOR: ICD-10-CM

## 2021-04-07 PROCEDURE — 99214 OFFICE O/P EST MOD 30 MIN: CPT

## 2021-04-07 RX ORDER — ALPRAZOLAM 0.25 MG/1
0.25 TABLET ORAL DAILY
Qty: 30 | Refills: 0 | Status: COMPLETED | COMMUNITY
Start: 2018-04-16 | End: 2021-04-07

## 2021-04-07 RX ORDER — INSULIN ASPART 100 [IU]/ML
100 INJECTION, SOLUTION INTRAVENOUS; SUBCUTANEOUS
Refills: 0 | Status: ACTIVE | COMMUNITY
Start: 2021-04-07

## 2021-04-07 NOTE — PHYSICAL EXAM
[No Acute Distress] : no acute distress [PERRL] : pupils equal round and reactive to light [Normal Oropharynx] : the oropharynx was normal [Supple] : supple [Clear to Auscultation] : lungs were clear to auscultation bilaterally [Normal S1, S2] : normal S1 and S2 [No Edema] : there was no peripheral edema [Normal] : soft, non-tender, non-distended, no masses palpated, no HSM and normal bowel sounds [Normal Supraclavicular Nodes] : no supraclavicular lymphadenopathy [Normal Posterior Cervical Nodes] : no posterior cervical lymphadenopathy [Normal Anterior Cervical Nodes] : no anterior cervical lymphadenopathy [No Joint Swelling] : no joint swelling [No Rash] : no rash [Speech Grossly Normal] : speech grossly normal [Normal Affect] : the affect was normal [Normal Insight/Judgement] : insight and judgment were intact [de-identified] : RETICULAR  RASH LEFT LE

## 2021-04-07 NOTE — ASSESSMENT
[FreeTextEntry1] : MS  EXACERBATION LIVEDO RETICULARIS VASCULITIS  LABS HTN AT GOAL HLD  AND DM lab evaluation  START ASA 81 \par

## 2021-04-07 NOTE — HISTORY OF PRESENT ILLNESS
[Diabetes Mellitus] : Diabetes Mellitus [Hyperlipidemia] : Hyperlipidemia [Hypertension] : Hypertension [Most Recent A1C: ___] : Most recent A1C was [unfilled] [Near target goal] : A1C near target goal [Does not check BP] : The patient is not checking blood pressure [<140/90] : Target blood pressure is <140/90 [Target goal met] : BP target goal met [Doing Well] : Patient is doing well [Low Intensity Therapy] : Patient is currently on low intensity statin  therapy [FreeTextEntry6] : glucose has been high MS  GETTING WORSE GOING TO PT SEES  NEURO STARTING PLEGRATITY INJECTIONS HAS  RETICULAR RASH ON LOWER RT LOWER  EXTREMITY  [de-identified] : sees  endo  [FreeTextEntry1] : goes to PT 2 x a wk for MS

## 2021-04-08 LAB
ALBUMIN SERPL ELPH-MCNC: 4.7 G/DL
ALP BLD-CCNC: 107 U/L
ALT SERPL-CCNC: 16 U/L
ANION GAP SERPL CALC-SCNC: 13 MMOL/L
APPEARANCE: CLEAR
AST SERPL-CCNC: 17 U/L
B BURGDOR IGG+IGM SER QL IB: NORMAL
BACTERIA: NEGATIVE
BASOPHILS # BLD AUTO: 0.04 K/UL
BASOPHILS NFR BLD AUTO: 0.5 %
BILIRUB SERPL-MCNC: 0.3 MG/DL
BILIRUBIN URINE: NEGATIVE
BLOOD URINE: NEGATIVE
BUN SERPL-MCNC: 13 MG/DL
C3 SERPL-MCNC: 128 MG/DL
CALCIUM SERPL-MCNC: 9.9 MG/DL
CCP AB SER IA-ACNC: <8 UNITS
CHLORIDE SERPL-SCNC: 101 MMOL/L
CHOLEST SERPL-MCNC: 212 MG/DL
CO2 SERPL-SCNC: 30 MMOL/L
COLOR: NORMAL
CREAT SERPL-MCNC: 0.49 MG/DL
CREAT SPEC-SCNC: 55 MG/DL
EOSINOPHIL # BLD AUTO: 0.26 K/UL
EOSINOPHIL NFR BLD AUTO: 3.3 %
ERYTHROCYTE [SEDIMENTATION RATE] IN BLOOD BY WESTERGREN METHOD: 7 MM/HR
ESTIMATED AVERAGE GLUCOSE: 169 MG/DL
GLUCOSE QUALITATIVE U: NEGATIVE
GLUCOSE SERPL-MCNC: 109 MG/DL
HBA1C MFR BLD HPLC: 7.5 %
HCT VFR BLD CALC: 45.1 %
HDLC SERPL-MCNC: 86 MG/DL
HGB BLD-MCNC: 14.3 G/DL
HYALINE CASTS: 1 /LPF
IMM GRANULOCYTES NFR BLD AUTO: 0.3 %
KETONES URINE: NEGATIVE
LDLC SERPL CALC-MCNC: 108 MG/DL
LEUKOCYTE ESTERASE URINE: NEGATIVE
LYMPHOCYTES # BLD AUTO: 1.6 K/UL
LYMPHOCYTES NFR BLD AUTO: 20.5 %
MAN DIFF?: NORMAL
MCHC RBC-ENTMCNC: 28.8 PG
MCHC RBC-ENTMCNC: 31.7 GM/DL
MCV RBC AUTO: 90.9 FL
MICROALBUMIN 24H UR DL<=1MG/L-MCNC: <1.2 MG/DL
MICROALBUMIN/CREAT 24H UR-RTO: NORMAL MG/G
MICROSCOPIC-UA: NORMAL
MONOCYTES # BLD AUTO: 0.71 K/UL
MONOCYTES NFR BLD AUTO: 9.1 %
NEUTROPHILS # BLD AUTO: 5.18 K/UL
NEUTROPHILS NFR BLD AUTO: 66.3 %
NITRITE URINE: NEGATIVE
NONHDLC SERPL-MCNC: 125 MG/DL
PH URINE: 7
PLATELET # BLD AUTO: 253 K/UL
POTASSIUM SERPL-SCNC: 4.2 MMOL/L
PROT SERPL-MCNC: 7.2 G/DL
PROTEIN URINE: NEGATIVE
RBC # BLD: 4.96 M/UL
RBC # FLD: 12.6 %
RED BLOOD CELLS URINE: 1 /HPF
RF+CCP IGG SER-IMP: NEGATIVE
RHEUMATOID FACT SER QL: <10 IU/ML
SODIUM SERPL-SCNC: 144 MMOL/L
SPECIFIC GRAVITY URINE: 1.01
SQUAMOUS EPITHELIAL CELLS: 1 /HPF
T4 SERPL-MCNC: 6.5 UG/DL
TRIGL SERPL-MCNC: 85 MG/DL
TSH SERPL-ACNC: 2.64 UIU/ML
URATE SERPL-MCNC: 2.8 MG/DL
UROBILINOGEN URINE: NORMAL
WBC # FLD AUTO: 7.81 K/UL
WHITE BLOOD CELLS URINE: 2 /HPF

## 2021-04-10 LAB — ANA SER IF-ACNC: NEGATIVE

## 2021-04-12 ENCOUNTER — RX RENEWAL (OUTPATIENT)
Age: 71
End: 2021-04-12

## 2021-04-28 ENCOUNTER — APPOINTMENT (OUTPATIENT)
Dept: DERMATOLOGY | Facility: CLINIC | Age: 71
End: 2021-04-28

## 2021-10-06 ENCOUNTER — APPOINTMENT (OUTPATIENT)
Dept: FAMILY MEDICINE | Facility: CLINIC | Age: 71
End: 2021-10-06

## 2021-10-26 ENCOUNTER — RX RENEWAL (OUTPATIENT)
Age: 71
End: 2021-10-26

## 2021-10-27 ENCOUNTER — APPOINTMENT (OUTPATIENT)
Dept: FAMILY MEDICINE | Facility: CLINIC | Age: 71
End: 2021-10-27
Payer: MEDICARE

## 2021-10-27 VITALS
HEIGHT: 63 IN | BODY MASS INDEX: 21.26 KG/M2 | OXYGEN SATURATION: 98 % | TEMPERATURE: 97.6 F | SYSTOLIC BLOOD PRESSURE: 130 MMHG | WEIGHT: 120 LBS | DIASTOLIC BLOOD PRESSURE: 78 MMHG | HEART RATE: 80 BPM

## 2021-10-27 VITALS — RESPIRATION RATE: 16 BRPM | DIASTOLIC BLOOD PRESSURE: 80 MMHG | SYSTOLIC BLOOD PRESSURE: 140 MMHG | HEART RATE: 80 BPM

## 2021-10-27 LAB
BILIRUB UR QL STRIP: NEGATIVE
GLUCOSE UR-MCNC: 500
HCG UR QL: 0.2 EU/DL
HGB UR QL STRIP.AUTO: NORMAL
KETONES UR-MCNC: NEGATIVE
LEUKOCYTE ESTERASE UR QL STRIP: NORMAL
NITRITE UR QL STRIP: NEGATIVE
PH UR STRIP: 6
PROT UR STRIP-MCNC: NEGATIVE
SP GR UR STRIP: 1.01

## 2021-10-27 PROCEDURE — 99214 OFFICE O/P EST MOD 30 MIN: CPT | Mod: 25

## 2021-10-27 PROCEDURE — 81003 URINALYSIS AUTO W/O SCOPE: CPT | Mod: QW

## 2021-10-27 RX ORDER — PITAVASTATIN CALCIUM 2.09 MG/1
2 TABLET, FILM COATED ORAL
Qty: 90 | Refills: 1 | Status: COMPLETED | COMMUNITY
Start: 2020-06-17 | End: 2021-10-27

## 2021-10-27 RX ORDER — ATORVASTATIN CALCIUM 10 MG/1
10 TABLET, FILM COATED ORAL
Qty: 90 | Refills: 1 | Status: ACTIVE | COMMUNITY
Start: 2021-10-27

## 2021-10-27 RX ORDER — PEN NEEDLE, DIABETIC 29 G X1/2"
29G X 12.7MM NEEDLE, DISPOSABLE MISCELLANEOUS
Qty: 180 | Refills: 3 | Status: ACTIVE | COMMUNITY
Start: 2021-10-27 | End: 1900-01-01

## 2021-10-27 NOTE — ASSESSMENT
[FreeTextEntry1] : polyuria  increase  treseba to 10 un in am continue sliding scale recent episode of confusion and  slurred speech may be tia will restart asa bp acceptable with amlodipine carotid us novolog not seem to be helping will use  linger needle

## 2021-10-27 NOTE — PHYSICAL EXAM
[No Acute Distress] : no acute distress [Normal Oropharynx] : the oropharynx was normal [Supple] : supple [Clear to Auscultation] : lungs were clear to auscultation bilaterally [Regular Rhythm] : with a regular rhythm [No Edema] : there was no peripheral edema [Normal] : no posterior cervical lymphadenopathy and no anterior cervical lymphadenopathy [No Spinal Tenderness] : no spinal tenderness [No Focal Deficits] : no focal deficits [Normal Insight/Judgement] : insight and judgment were intact

## 2021-10-27 NOTE — HISTORY OF PRESENT ILLNESS
[Diabetes Mellitus] : Diabetes Mellitus [Hyperlipidemia] : Hyperlipidemia [Hypertension] : Hypertension [Spouse] : spouse [Most Recent A1C: ___] : Most recent A1C was [unfilled] [Does not check BP] : The patient is not checking blood pressure [<140/90] : Target blood pressure is <140/90 [Target goal met] : BP target goal met [Doing Well] : Patient is doing well [Low Intensity Therapy] : Patient is currently on low intensity statin  therapy [FreeTextEntry6] : glucose has been high MS  GETTING WORSE GOING TO PT SEES  NEURO STARTING PLEGRATITY INJECtions had  bad reaction no longer taking wants to try avonex has  had increase in urination and 5 min episode of slurred  speech and disorientation glucose has  been high glucose has been high using  dxcom 6  waking up in am with high glucose taking  7 u of tresba  in am taking  sliding scale of novolog hasd  been more forgetful as of late  [de-identified] : sees  endo  [FreeTextEntry1] : goes to PT 2 x a wk for MS

## 2021-11-02 LAB — BACTERIA UR CULT: ABNORMAL

## 2021-11-03 ENCOUNTER — OUTPATIENT (OUTPATIENT)
Dept: OUTPATIENT SERVICES | Facility: HOSPITAL | Age: 71
LOS: 1 days | End: 2021-11-03

## 2021-11-03 ENCOUNTER — APPOINTMENT (OUTPATIENT)
Dept: MRI IMAGING | Facility: CLINIC | Age: 71
End: 2021-11-03
Payer: MEDICARE

## 2021-11-03 ENCOUNTER — APPOINTMENT (OUTPATIENT)
Dept: ULTRASOUND IMAGING | Facility: CLINIC | Age: 71
End: 2021-11-03
Payer: MEDICARE

## 2021-11-03 DIAGNOSIS — G45.9 TRANSIENT CEREBRAL ISCHEMIC ATTACK, UNSPECIFIED: ICD-10-CM

## 2021-11-03 PROCEDURE — 70553 MRI BRAIN STEM W/O & W/DYE: CPT | Mod: 26,MG

## 2021-11-03 PROCEDURE — G1004: CPT

## 2021-11-03 PROCEDURE — 93880 EXTRACRANIAL BILAT STUDY: CPT | Mod: 26

## 2021-11-17 ENCOUNTER — APPOINTMENT (OUTPATIENT)
Dept: FAMILY MEDICINE | Facility: CLINIC | Age: 71
End: 2021-11-17
Payer: MEDICARE

## 2021-11-17 VITALS
TEMPERATURE: 97.4 F | OXYGEN SATURATION: 98 % | DIASTOLIC BLOOD PRESSURE: 88 MMHG | BODY MASS INDEX: 21.26 KG/M2 | WEIGHT: 120 LBS | SYSTOLIC BLOOD PRESSURE: 138 MMHG | HEART RATE: 78 BPM | HEIGHT: 63 IN

## 2021-11-17 VITALS — RESPIRATION RATE: 16 BRPM | HEART RATE: 72 BPM | SYSTOLIC BLOOD PRESSURE: 160 MMHG | DIASTOLIC BLOOD PRESSURE: 80 MMHG

## 2021-11-17 LAB — GLUCOSE BLDC GLUCOMTR-MCNC: 256

## 2021-11-17 PROCEDURE — 82962 GLUCOSE BLOOD TEST: CPT

## 2021-11-17 PROCEDURE — 99214 OFFICE O/P EST MOD 30 MIN: CPT

## 2021-11-17 NOTE — HISTORY OF PRESENT ILLNESS
[Diabetes Mellitus] : Diabetes Mellitus [Hyperlipidemia] : Hyperlipidemia [Hypertension] : Hypertension [Spouse] : spouse [Most Recent A1C: ___] : Most recent A1C was [unfilled] [Does not check BP] : The patient is not checking blood pressure [<140/90] : Target blood pressure is <140/90 [Target goal met] : BP target goal met [Doing Well] : Patient is doing well [Low Intensity Therapy] : Patient is currently on low intensity statin  therapy [FreeTextEntry6] : pt  seeing new neuro had mri of c\par spine thoracic spine and and  brain \par doing well with tresiba  [de-identified] : sees  endo  [FreeTextEntry1] : goes to PT 2 x a wk for MS

## 2021-11-17 NOTE — ASSESSMENT
Milton Rincon called and was looking to speak with a nurse about getting her imaging switched to the [place] location rather than going to [place]. [FreeTextEntry1] : DM  SEES  ENDO CONTINUE TRESEBA HTN CONTINUE AMLODIPINE HLD CONTINUE  ATORVASTATIN BP  BORDERLINE IF ;EVATED  NEST TIME WILL ADJUST MEDS BUT DID NOT TAKE MEDS TODAY

## 2021-12-08 ENCOUNTER — APPOINTMENT (OUTPATIENT)
Dept: FAMILY MEDICINE | Facility: CLINIC | Age: 71
End: 2021-12-08

## 2021-12-16 ENCOUNTER — RX RENEWAL (OUTPATIENT)
Age: 71
End: 2021-12-16

## 2022-01-05 ENCOUNTER — APPOINTMENT (OUTPATIENT)
Dept: FAMILY MEDICINE | Facility: CLINIC | Age: 72
End: 2022-01-05
Payer: MEDICARE

## 2022-01-05 VITALS — RESPIRATION RATE: 16 BRPM | HEART RATE: 72 BPM | SYSTOLIC BLOOD PRESSURE: 150 MMHG | DIASTOLIC BLOOD PRESSURE: 80 MMHG

## 2022-01-05 LAB
BILIRUB UR QL STRIP: NEGATIVE
GLUCOSE UR-MCNC: 1000
HCG UR QL: 0.2 EU/DL
HGB UR QL STRIP.AUTO: NORMAL
KETONES UR-MCNC: NEGATIVE
LEUKOCYTE ESTERASE UR QL STRIP: NORMAL
NITRITE UR QL STRIP: NEGATIVE
PH UR STRIP: 7
PROT UR STRIP-MCNC: NEGATIVE
SP GR UR STRIP: 1.02

## 2022-01-05 PROCEDURE — 99497 ADVNCD CARE PLAN 30 MIN: CPT

## 2022-01-05 PROCEDURE — 81003 URINALYSIS AUTO W/O SCOPE: CPT | Mod: QW

## 2022-01-05 PROCEDURE — G0439: CPT

## 2022-01-05 PROCEDURE — G0444 DEPRESSION SCREEN ANNUAL: CPT | Mod: 59

## 2022-01-05 PROCEDURE — 99214 OFFICE O/P EST MOD 30 MIN: CPT | Mod: 25

## 2022-01-05 RX ORDER — INSULIN ASPART 100 [IU]/ML
100 INJECTION, SOLUTION INTRAVENOUS; SUBCUTANEOUS
Qty: 40 | Refills: 0 | Status: ACTIVE | COMMUNITY
Start: 2020-05-05

## 2022-01-05 NOTE — ASSESSMENT
[FreeTextEntry1] : DM  POOR CONTROLL STILL HAS GLUCOSURIA INCREASE TRESIBA  TO 20 UN HS  CONTINUE COVERAGE WITH NOVOLOG 6 UN TID HLS CONEINUE ATORVASTATIN BP BOEDERLINE WILL MONITOR CONTINUE AMLODIPINE AND  PAMIPRIL lab evaluation\par

## 2022-01-05 NOTE — HEALTH RISK ASSESSMENT
[Fair] :  ~his/her~ mood as fair [Never] : Never [No] : No [0] : 2) Feeling down, depressed, or hopeless: Not at all (0) [PHQ-2 Negative - No further assessment needed] : PHQ-2 Negative - No further assessment needed [With Family] : lives with family [College] : College [] :  [# Of Children ___] : has [unfilled] children [Fully functional (bathing, dressing, toileting, transferring, walking, feeding)] : Fully functional (bathing, dressing, toileting, transferring, walking, feeding) [Fully functional (using the telephone, shopping, preparing meals, housekeeping, doing laundry, using] : Fully functional and needs no help or supervision to perform IADLs (using the telephone, shopping, preparing meals, housekeeping, doing laundry, using transportation, managing medications and managing finances) [Smoke Detector] : smoke detector [Carbon Monoxide Detector] : carbon monoxide detector [Seat Belt] :  uses seat belt [With Patient/Caregiver] : , with patient/caregiver [Name: ___] : Health Care Proxy's Name: [unfilled]  [Relationship: ___] : Relationship: [unfilled] [de-identified] : NEURO  [de-identified] : BILLIE  [[id number]] : 0  [Change in mental status noted] : No change in mental status noted [Language] : denies difficulty with language [Reports changes in hearing] : Reports no changes in hearing [Reports changes in vision] : Reports no changes in vision [Reports changes in dental health] : Reports no changes in dental health [ColonoscopyDate] : 1/15 [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

## 2022-01-05 NOTE — HISTORY OF PRESENT ILLNESS
[FreeTextEntry1] : pt here for cpe and management of HTN DM AND HLD MS  [de-identified] : STARTED ON ABAGIO FOR MS

## 2022-01-05 NOTE — PHYSICAL EXAM
[No Acute Distress] : no acute distress [Normal Oropharynx] : the oropharynx was normal [Supple] : supple [Clear to Auscultation] : lungs were clear to auscultation bilaterally [Regular Rhythm] : with a regular rhythm [No Edema] : there was no peripheral edema [Normal] : no posterior cervical lymphadenopathy and no anterior cervical lymphadenopathy [No Spinal Tenderness] : no spinal tenderness [No Joint Swelling] : no joint swelling [No Focal Deficits] : no focal deficits [Normal Insight/Judgement] : insight and judgment were intact [de-identified] : CANE

## 2022-01-06 LAB
ALBUMIN SERPL ELPH-MCNC: 4.4 G/DL
ALP BLD-CCNC: 118 U/L
ALT SERPL-CCNC: 16 U/L
ANION GAP SERPL CALC-SCNC: 12 MMOL/L
APPEARANCE: CLEAR
AST SERPL-CCNC: 17 U/L
BACTERIA: NEGATIVE
BASOPHILS # BLD AUTO: 0.03 K/UL
BASOPHILS NFR BLD AUTO: 0.4 %
BILIRUB SERPL-MCNC: 0.3 MG/DL
BILIRUBIN URINE: NEGATIVE
BLOOD URINE: NEGATIVE
BUN SERPL-MCNC: 15 MG/DL
CALCIUM SERPL-MCNC: 9.4 MG/DL
CHLORIDE SERPL-SCNC: 100 MMOL/L
CHOLEST SERPL-MCNC: 209 MG/DL
CO2 SERPL-SCNC: 28 MMOL/L
COLOR: NORMAL
CREAT SERPL-MCNC: 0.55 MG/DL
CREAT SPEC-SCNC: 40 MG/DL
EOSINOPHIL # BLD AUTO: 0.18 K/UL
EOSINOPHIL NFR BLD AUTO: 2.5 %
ESTIMATED AVERAGE GLUCOSE: 177 MG/DL
GLUCOSE QUALITATIVE U: ABNORMAL
GLUCOSE SERPL-MCNC: 361 MG/DL
HBA1C MFR BLD HPLC: 7.8 %
HCT VFR BLD CALC: 41.7 %
HDLC SERPL-MCNC: 80 MG/DL
HGB BLD-MCNC: 13.4 G/DL
HYALINE CASTS: 0 /LPF
IMM GRANULOCYTES NFR BLD AUTO: 0.1 %
KETONES URINE: NEGATIVE
LDLC SERPL CALC-MCNC: 110 MG/DL
LEUKOCYTE ESTERASE URINE: ABNORMAL
LYMPHOCYTES # BLD AUTO: 1.45 K/UL
LYMPHOCYTES NFR BLD AUTO: 19.9 %
MAN DIFF?: NORMAL
MCHC RBC-ENTMCNC: 29 PG
MCHC RBC-ENTMCNC: 32.1 GM/DL
MCV RBC AUTO: 90.3 FL
MICROALBUMIN 24H UR DL<=1MG/L-MCNC: 2.1 MG/DL
MICROALBUMIN/CREAT 24H UR-RTO: 53 MG/G
MICROSCOPIC-UA: NORMAL
MONOCYTES # BLD AUTO: 0.5 K/UL
MONOCYTES NFR BLD AUTO: 6.9 %
NEUTROPHILS # BLD AUTO: 5.11 K/UL
NEUTROPHILS NFR BLD AUTO: 70.2 %
NITRITE URINE: NEGATIVE
NONHDLC SERPL-MCNC: 129 MG/DL
PH URINE: 7
PLATELET # BLD AUTO: 225 K/UL
POTASSIUM SERPL-SCNC: 4.9 MMOL/L
PROT SERPL-MCNC: 6.8 G/DL
PROTEIN URINE: NEGATIVE
RBC # BLD: 4.62 M/UL
RBC # FLD: 12.3 %
RED BLOOD CELLS URINE: 2 /HPF
SODIUM SERPL-SCNC: 140 MMOL/L
SPECIFIC GRAVITY URINE: 1.03
SQUAMOUS EPITHELIAL CELLS: 6 /HPF
T4 SERPL-MCNC: 6.2 UG/DL
TRIGL SERPL-MCNC: 97 MG/DL
TSH SERPL-ACNC: 1.64 UIU/ML
URATE SERPL-MCNC: 2.1 MG/DL
UROBILINOGEN URINE: NORMAL
WBC # FLD AUTO: 7.28 K/UL
WHITE BLOOD CELLS URINE: 12 /HPF

## 2022-02-09 ENCOUNTER — APPOINTMENT (OUTPATIENT)
Dept: FAMILY MEDICINE | Facility: CLINIC | Age: 72
End: 2022-02-09

## 2022-02-16 ENCOUNTER — APPOINTMENT (OUTPATIENT)
Dept: FAMILY MEDICINE | Facility: CLINIC | Age: 72
End: 2022-02-16

## 2022-03-14 ENCOUNTER — APPOINTMENT (OUTPATIENT)
Dept: FAMILY MEDICINE | Facility: CLINIC | Age: 72
End: 2022-03-14
Payer: MEDICARE

## 2022-03-14 VITALS
TEMPERATURE: 97.7 F | HEART RATE: 78 BPM | SYSTOLIC BLOOD PRESSURE: 144 MMHG | WEIGHT: 120 LBS | OXYGEN SATURATION: 95 % | DIASTOLIC BLOOD PRESSURE: 68 MMHG | BODY MASS INDEX: 21.26 KG/M2 | HEIGHT: 63 IN

## 2022-03-14 VITALS — HEART RATE: 72 BPM | SYSTOLIC BLOOD PRESSURE: 160 MMHG | RESPIRATION RATE: 16 BRPM | DIASTOLIC BLOOD PRESSURE: 80 MMHG

## 2022-03-14 PROCEDURE — 99214 OFFICE O/P EST MOD 30 MIN: CPT

## 2022-03-14 NOTE — HISTORY OF PRESENT ILLNESS
[Diabetes Mellitus] : Diabetes Mellitus [Hyperlipidemia] : Hyperlipidemia [Hypertension] : Hypertension [Spouse] : spouse [Family Member] : family member [Most Recent A1C: ___] : Most recent A1C was [unfilled] [Near target goal] : A1C near target goal [Does not check BP] : The patient is not checking blood pressure [<140/90] : Target blood pressure is <140/90 [Target goal met] : BP target goal met [Doing Well] : Patient is doing well [Low Intensity Therapy] : Patient is currently on low intensity statin  therapy [FreeTextEntry6] : pt  fell 1 mo  ago  injured  left ankle had  mri of ankle saw  podiatry also had  doppler  [de-identified] : sees  endo  [FreeTextEntry1] : goes to PT 2 x a wk for MS

## 2022-03-14 NOTE — PHYSICAL EXAM
[No Acute Distress] : no acute distress [PERRL] : pupils equal round and reactive to light [Supple] : supple [Clear to Auscultation] : lungs were clear to auscultation bilaterally [Regular Rhythm] : with a regular rhythm [No Edema] : there was no peripheral edema [Normal] : soft, non-tender, non-distended, no masses palpated, no HSM and normal bowel sounds [de-identified] : ischemic  ulcer left lateral malleolus

## 2022-03-14 NOTE — ASSESSMENT
[FreeTextEntry1] : bp  borderline  continue amlodipine hld continue atorvastatin dm fair control with treshiba and novologh vacular ulcer  f/u with surgery and silvadeine add hctz for better  bp control cardio referral r/o cad

## 2022-03-23 ENCOUNTER — EMERGENCY (EMERGENCY)
Facility: HOSPITAL | Age: 72
LOS: 1 days | Discharge: HOME | End: 2022-03-23
Attending: STUDENT IN AN ORGANIZED HEALTH CARE EDUCATION/TRAINING PROGRAM
Payer: MEDICARE

## 2022-03-23 VITALS
SYSTOLIC BLOOD PRESSURE: 147 MMHG | OXYGEN SATURATION: 98 % | HEART RATE: 84 BPM | HEIGHT: 63 IN | WEIGHT: 119.93 LBS | TEMPERATURE: 98 F | DIASTOLIC BLOOD PRESSURE: 72 MMHG | RESPIRATION RATE: 17 BRPM

## 2022-03-23 PROCEDURE — 99283 EMERGENCY DEPT VISIT LOW MDM: CPT

## 2022-03-23 PROCEDURE — 99283 EMERGENCY DEPT VISIT LOW MDM: CPT | Mod: FS

## 2022-03-23 RX ORDER — IBUPROFEN 200 MG
800 TABLET ORAL ONCE
Refills: 0 | Status: COMPLETED | OUTPATIENT
Start: 2022-03-23 | End: 2022-03-23

## 2022-03-23 RX ORDER — IBUPROFEN 200 MG
800 TABLET ORAL ONCE
Refills: 0 | Status: DISCONTINUED | OUTPATIENT
Start: 2022-03-23 | End: 2022-03-28

## 2022-03-23 RX ORDER — IBUPROFEN 200 MG
1 TABLET ORAL
Qty: 20 | Refills: 0
Start: 2022-03-23

## 2022-03-23 RX ADMIN — Medication 800 MILLIGRAM(S): at 21:02

## 2022-03-23 RX ADMIN — Medication 1 TABLET(S): at 21:02

## 2022-03-23 NOTE — ED PROVIDER NOTE - CLINICAL SUMMARY MEDICAL DECISION MAKING FREE TEXT BOX
pt with nonhealing ulcer left ankle x 1 month, in pain  will give ibuprofen for pain and cover with abx, pt to follow back up with her vascular doctor next week  regarding blood clots pt is being managed by vascular as well, denies chest pain/SOB.

## 2022-03-23 NOTE — ED PROVIDER NOTE - OBJECTIVE STATEMENT
72 y/o F with hx DM, MS presents to ED c/o left ankle pain. pt fell approx 1 month ago, sustained small blister left lateral ankle. reports the blister/ulceration has been present since that time causing her a lot of pain and discomfort. she has also noticed some swelling/discoloration to bilateral calves/ankles. pt and  report they had MRI foot done with no fractures. they saw a vascular Dr. Singh last week, had US BLE done in office showing blood clots in both legs. Pt was advised to take aspirin daily  and is to follow back up next week for procedure to remove clots. Pt was not offered much advise regarding the ulcer and it is causing her substantial pain prompting today's ER visit. Denies fever/chills, n/v/d, numbness, weakness, chest pain, shortness of breath

## 2022-03-23 NOTE — ED PROVIDER NOTE - NS ED ATTENDING STATEMENT MOD
This was a shared visit with the ALLEY. I reviewed and verified the documentation and independently performed the documented:

## 2022-03-23 NOTE — ED PROVIDER NOTE - PHYSICAL EXAMINATION
Gen: No acute distress, non toxic  HEENT: Mucous membranes moist, pink conjunctivae, EOMI  CV: RRR, nl s1/s2.  Resp: CTAB, normal rate and effort  Neuro: A&O x 3, moving all 4 extremities  MSK: +Left lateral malleolus 1cm ulceration noted +tenderness to palpation mild erythema, no warmth. full ROM, 2+ pulses, sensation intact, b/l edema   Skin: No rashes. intact and perfused.

## 2022-03-23 NOTE — ED PROVIDER NOTE - NSFOLLOWUPINSTRUCTIONS_ED_ALL_ED_FT
Please take ibuprofen 800mg every 8 hours as needed for pain  may also take tylenol as needed for pain  Follow up with vascular doctor next week

## 2022-03-23 NOTE — ED PROVIDER NOTE - ATTENDING CONTRIBUTION TO CARE
Patient with ulcerated lesion the left lateral ankle with signs of mild inflammation ( localized tenderness and erythema) Patient is getting lab work tomorrow and will see her surgeon Tuesday for angiogram.  I personally saw the patient with the PA, and completed the key components of the history and physical exam. I then discussed the management plan with the PA.

## 2022-03-23 NOTE — ED ADULT TRIAGE NOTE - CHIEF COMPLAINT QUOTE
pt states she has an ulcer on her left ankle with swelling, had sono done two days ago and was told she has two blood clots in her leg.  denies chest pain or SOB.

## 2022-03-23 NOTE — ED PROVIDER NOTE - PATIENT PORTAL LINK FT
You can access the FollowMyHealth Patient Portal offered by [facility] by registering at the following website: [email or web address]. By joining [employer]’s FollowMyHealth portal, you will also be able to view your health information using other applications (apps) compatible with our system.

## 2022-03-26 RX ORDER — IBUPROFEN 200 MG
1 TABLET ORAL
Qty: 28 | Refills: 0
Start: 2022-03-26 | End: 2022-04-01

## 2022-04-13 ENCOUNTER — APPOINTMENT (OUTPATIENT)
Dept: FAMILY MEDICINE | Facility: CLINIC | Age: 72
End: 2022-04-13
Payer: MEDICARE

## 2022-04-13 VITALS — DIASTOLIC BLOOD PRESSURE: 80 MMHG | RESPIRATION RATE: 16 BRPM | SYSTOLIC BLOOD PRESSURE: 140 MMHG | HEART RATE: 72 BPM

## 2022-04-13 VITALS
WEIGHT: 125 LBS | TEMPERATURE: 97.8 F | SYSTOLIC BLOOD PRESSURE: 160 MMHG | HEART RATE: 80 BPM | HEIGHT: 63 IN | DIASTOLIC BLOOD PRESSURE: 72 MMHG | OXYGEN SATURATION: 98 % | BODY MASS INDEX: 22.15 KG/M2

## 2022-04-13 PROBLEM — G35 MULTIPLE SCLEROSIS: Chronic | Status: ACTIVE | Noted: 2022-03-23

## 2022-04-13 PROBLEM — E11.9 TYPE 2 DIABETES MELLITUS WITHOUT COMPLICATIONS: Chronic | Status: ACTIVE | Noted: 2022-03-23

## 2022-04-13 PROCEDURE — 99214 OFFICE O/P EST MOD 30 MIN: CPT

## 2022-04-13 RX ORDER — HYDROCHLOROTHIAZIDE 12.5 MG/1
12.5 CAPSULE ORAL
Qty: 90 | Refills: 1 | Status: DISCONTINUED | COMMUNITY
Start: 2022-03-14 | End: 2022-04-13

## 2022-04-13 NOTE — PHYSICAL EXAM
[No Acute Distress] : no acute distress [PERRL] : pupils equal round and reactive to light [Normal TMs] : both tympanic membranes were normal [Supple] : supple [Clear to Auscultation] : lungs were clear to auscultation bilaterally [Regular Rhythm] : with a regular rhythm [Normal] : soft, non-tender, non-distended, no masses palpated, no HSM and normal bowel sounds [de-identified] : 2 +  edema lleft let 1+ edema  rt leg  [de-identified] : left lateral maleolus  ulcer smaller  abrasion of plantar surface rt foot

## 2022-04-13 NOTE — HISTORY OF PRESENT ILLNESS
[Diabetes Mellitus] : Diabetes Mellitus [Hyperlipidemia] : Hyperlipidemia [Hypertension] : Hypertension [Spouse] : spouse [Family Member] : family member [Most Recent A1C: ___] : Most recent A1C was [unfilled] [Near target goal] : A1C near target goal [Does not check BP] : The patient is not checking blood pressure [<140/90] : Target blood pressure is <140/90 [Target goal met] : BP target goal met [Doing Well] : Patient is doing well [Low Intensity Therapy] : Patient is currently on low intensity statin  therapy [FreeTextEntry6] : saw  vascular had  angioplasty left leg  left arterial ulcer smaller but has swelling left leg > rt leg  [de-identified] : sees  endo  [FreeTextEntry1] : goes to PT 2 x a wk for MS

## 2022-04-13 NOTE — ASSESSMENT
[FreeTextEntry1] : pvd improved  with angioplasty but now has bilateral edema   will decrease amlodipine to 5 mg daily dc hctz and add dyazide silvadeine  to foot lesions bp acceptable  but meds adjusted  dm continue novolg and  tresheba rtc  1 mo tramdol  for  ankle pain

## 2022-04-29 ENCOUNTER — RX RENEWAL (OUTPATIENT)
Age: 72
End: 2022-04-29

## 2022-05-11 ENCOUNTER — APPOINTMENT (OUTPATIENT)
Dept: FAMILY MEDICINE | Facility: CLINIC | Age: 72
End: 2022-05-11
Payer: MEDICARE

## 2022-05-11 VITALS
DIASTOLIC BLOOD PRESSURE: 68 MMHG | HEIGHT: 63 IN | BODY MASS INDEX: 22.18 KG/M2 | HEART RATE: 76 BPM | TEMPERATURE: 98.3 F | OXYGEN SATURATION: 99 % | WEIGHT: 125.19 LBS | SYSTOLIC BLOOD PRESSURE: 148 MMHG

## 2022-05-11 VITALS — DIASTOLIC BLOOD PRESSURE: 70 MMHG | HEART RATE: 80 BPM | RESPIRATION RATE: 16 BRPM | SYSTOLIC BLOOD PRESSURE: 140 MMHG

## 2022-05-11 PROCEDURE — 99214 OFFICE O/P EST MOD 30 MIN: CPT

## 2022-05-11 NOTE — HISTORY OF PRESENT ILLNESS
[Diabetes Mellitus] : Diabetes Mellitus [Hyperlipidemia] : Hyperlipidemia [Hypertension] : Hypertension [Spouse] : spouse [Family Member] : family member [Check glucose ___ x/day] : Patient checks  blood glucose [unfilled]  times a day [Most Recent A1C: ___] : Most recent A1C was [unfilled] [Does not check BP] : The patient is not checking blood pressure [<140/90] : Target blood pressure is <140/90 [Target goal met] : BP target goal met [Doing Well] : Patient is doing well [Low Intensity Therapy] : Patient is currently on low intensity statin  therapy [FreeTextEntry6] : saw  vascular had  angioplasty left leg  left arterial  ulcer healing seeing vascular leg swelling less with lower dose of amlodipine and dyazide saw endo has mild elevation of ast and alt last  a1c  7.3  [de-identified] : sees  endo  [FreeTextEntry1] : goes to PT 2 x a wk for MS

## 2022-05-11 NOTE — ASSESSMENT
[FreeTextEntry1] : edema  much les  with dyazide bp acceptable continue amlodipine  and ramipril  hld continue atorvastatin

## 2022-05-11 NOTE — PHYSICAL EXAM
[No Acute Distress] : no acute distress [PERRL] : pupils equal round and reactive to light [Normal TMs] : both tympanic membranes were normal [Supple] : supple [Clear to Auscultation] : lungs were clear to auscultation bilaterally [Regular Rhythm] : with a regular rhythm [Normal] : soft, non-tender, non-distended, no masses palpated, no HSM and normal bowel sounds [de-identified] : tr  edenal  left  leg none rt leg  [de-identified] : left lateral malleolus  ulcer  much smller 2 mm diameter

## 2022-06-16 ENCOUNTER — RX RENEWAL (OUTPATIENT)
Age: 72
End: 2022-06-16

## 2022-07-11 ENCOUNTER — RX RENEWAL (OUTPATIENT)
Age: 72
End: 2022-07-11

## 2022-07-20 ENCOUNTER — APPOINTMENT (OUTPATIENT)
Dept: FAMILY MEDICINE | Facility: CLINIC | Age: 72
End: 2022-07-20

## 2022-07-20 VITALS — HEART RATE: 80 BPM | DIASTOLIC BLOOD PRESSURE: 80 MMHG | SYSTOLIC BLOOD PRESSURE: 126 MMHG | RESPIRATION RATE: 16 BRPM

## 2022-07-20 VITALS
HEIGHT: 63 IN | OXYGEN SATURATION: 97 % | HEART RATE: 84 BPM | SYSTOLIC BLOOD PRESSURE: 121 MMHG | BODY MASS INDEX: 22.15 KG/M2 | DIASTOLIC BLOOD PRESSURE: 62 MMHG | WEIGHT: 125 LBS | TEMPERATURE: 97.8 F

## 2022-07-20 PROCEDURE — 99214 OFFICE O/P EST MOD 30 MIN: CPT

## 2022-07-20 RX ORDER — SILVER SULFADIAZINE 10 MG/G
1 CREAM TOPICAL TWICE DAILY
Qty: 1 | Refills: 0 | Status: COMPLETED | COMMUNITY
Start: 2022-03-14 | End: 2022-07-20

## 2022-07-20 RX ORDER — CLOPIDOGREL BISULFATE 75 MG/1
75 TABLET, FILM COATED ORAL
Qty: 90 | Refills: 0 | Status: ACTIVE | COMMUNITY
Start: 2022-07-11

## 2022-07-20 RX ORDER — PEN NEEDLE, DIABETIC 29 G X1/2"
31G X 5 MM NEEDLE, DISPOSABLE MISCELLANEOUS
Qty: 400 | Refills: 0 | Status: ACTIVE | COMMUNITY
Start: 2022-05-20

## 2022-07-20 RX ORDER — GABAPENTIN 100 MG/1
100 CAPSULE ORAL
Qty: 30 | Refills: 0 | Status: COMPLETED | COMMUNITY
Start: 2022-05-31

## 2022-07-20 NOTE — ASSESSMENT
[FreeTextEntry1] : dm continue treseba and novolog hld continue atorvastatin  htn continue amlodipine dyazide and ramipril lab evaluation\par

## 2022-07-20 NOTE — HISTORY OF PRESENT ILLNESS
[Diabetes Mellitus] : Diabetes Mellitus [Hyperlipidemia] : Hyperlipidemia [Hypertension] : Hypertension [Spouse] : spouse [Family Member] : family member [Check glucose ___ x/day] : Patient checks  blood glucose [unfilled]  times a day [Most Recent A1C: ___] : Most recent A1C was [unfilled] [Does not check BP] : The patient is not checking blood pressure [<140/90] : Target blood pressure is <140/90 [Target goal met] : BP target goal met [Doing Well] : Patient is doing well [Low Intensity Therapy] : Patient is currently on low intensity statin  therapy [FreeTextEntry6] : saw  vascular had  angioplasty left leg  left arterial  ulcer healed  [de-identified] : sees  endo  [FreeTextEntry1] : goes to PT 2 x a wk for MS

## 2022-07-20 NOTE — PHYSICAL EXAM
[No Acute Distress] : no acute distress [PERRL] : pupils equal round and reactive to light [Normal TMs] : both tympanic membranes were normal [Supple] : supple [Clear to Auscultation] : lungs were clear to auscultation bilaterally [Regular Rhythm] : with a regular rhythm [No Edema] : there was no peripheral edema [Normal] : soft, non-tender, non-distended, no masses palpated, no HSM and normal bowel sounds [de-identified] : left lateral malleolus  ulcer  much smller 2 mm diameter

## 2022-07-21 LAB
ALBUMIN SERPL ELPH-MCNC: 4.7 G/DL
ALP BLD-CCNC: 93 U/L
ALT SERPL-CCNC: 13 U/L
ANION GAP SERPL CALC-SCNC: 10 MMOL/L
AST SERPL-CCNC: 19 U/L
BASOPHILS # BLD AUTO: 0.04 K/UL
BASOPHILS NFR BLD AUTO: 0.6 %
BILIRUB SERPL-MCNC: 0.2 MG/DL
BUN SERPL-MCNC: 11 MG/DL
CALCIUM SERPL-MCNC: 9.8 MG/DL
CHLORIDE SERPL-SCNC: 98 MMOL/L
CHOLEST SERPL-MCNC: 188 MG/DL
CO2 SERPL-SCNC: 30 MMOL/L
CREAT SERPL-MCNC: 0.58 MG/DL
EGFR: 95 ML/MIN/1.73M2
EOSINOPHIL # BLD AUTO: 0.16 K/UL
EOSINOPHIL NFR BLD AUTO: 2.4 %
ESTIMATED AVERAGE GLUCOSE: 171 MG/DL
GLUCOSE SERPL-MCNC: 137 MG/DL
HBA1C MFR BLD HPLC: 7.6 %
HCT VFR BLD CALC: 40.2 %
HDLC SERPL-MCNC: 80 MG/DL
HGB BLD-MCNC: 12.9 G/DL
IMM GRANULOCYTES NFR BLD AUTO: 0.2 %
LDLC SERPL CALC-MCNC: 84 MG/DL
LYMPHOCYTES # BLD AUTO: 1.45 K/UL
LYMPHOCYTES NFR BLD AUTO: 22 %
MAN DIFF?: NORMAL
MCHC RBC-ENTMCNC: 30.4 PG
MCHC RBC-ENTMCNC: 32.1 GM/DL
MCV RBC AUTO: 94.6 FL
MONOCYTES # BLD AUTO: 0.56 K/UL
MONOCYTES NFR BLD AUTO: 8.5 %
NEUTROPHILS # BLD AUTO: 4.37 K/UL
NEUTROPHILS NFR BLD AUTO: 66.3 %
NONHDLC SERPL-MCNC: 108 MG/DL
PLATELET # BLD AUTO: 252 K/UL
POTASSIUM SERPL-SCNC: 4.3 MMOL/L
PROT SERPL-MCNC: 6.8 G/DL
RBC # BLD: 4.25 M/UL
RBC # FLD: 12.3 %
SODIUM SERPL-SCNC: 139 MMOL/L
T4 SERPL-MCNC: 6.6 UG/DL
TRIGL SERPL-MCNC: 119 MG/DL
TSH SERPL-ACNC: 1.72 UIU/ML
URATE SERPL-MCNC: 3.5 MG/DL
WBC # FLD AUTO: 6.59 K/UL

## 2022-07-27 ENCOUNTER — NON-APPOINTMENT (OUTPATIENT)
Age: 72
End: 2022-07-27

## 2022-07-27 ENCOUNTER — APPOINTMENT (OUTPATIENT)
Dept: CARDIOLOGY | Facility: CLINIC | Age: 72
End: 2022-07-27

## 2022-07-27 VITALS
SYSTOLIC BLOOD PRESSURE: 150 MMHG | BODY MASS INDEX: 22.15 KG/M2 | RESPIRATION RATE: 14 BRPM | TEMPERATURE: 98.2 F | HEART RATE: 80 BPM | HEIGHT: 63 IN | DIASTOLIC BLOOD PRESSURE: 66 MMHG | WEIGHT: 125 LBS | OXYGEN SATURATION: 98 %

## 2022-07-27 VITALS — SYSTOLIC BLOOD PRESSURE: 146 MMHG | DIASTOLIC BLOOD PRESSURE: 50 MMHG

## 2022-07-27 DIAGNOSIS — R51.9 HEADACHE, UNSPECIFIED: ICD-10-CM

## 2022-07-27 DIAGNOSIS — R09.89 OTHER SPECIFIED SYMPTOMS AND SIGNS INVOLVING THE CIRCULATORY AND RESPIRATORY SYSTEMS: ICD-10-CM

## 2022-07-27 DIAGNOSIS — Z00.00 ENCOUNTER FOR GENERAL ADULT MEDICAL EXAMINATION W/OUT ABNORMAL FINDINGS: ICD-10-CM

## 2022-07-27 DIAGNOSIS — Z86.39 PERSONAL HISTORY OF OTHER ENDOCRINE, NUTRITIONAL AND METABOLIC DISEASE: ICD-10-CM

## 2022-07-27 DIAGNOSIS — R60.0 LOCALIZED EDEMA: ICD-10-CM

## 2022-07-27 DIAGNOSIS — R92.2 INCONCLUSIVE MAMMOGRAM: ICD-10-CM

## 2022-07-27 PROCEDURE — 93000 ELECTROCARDIOGRAM COMPLETE: CPT | Mod: 59

## 2022-07-27 PROCEDURE — 99205 OFFICE O/P NEW HI 60 MIN: CPT

## 2022-07-27 PROCEDURE — 93270 REMOTE 30 DAY ECG REV/REPORT: CPT

## 2022-07-27 RX ORDER — OXYCODONE AND ACETAMINOPHEN 5; 325 MG/1; MG/1
5-325 TABLET ORAL
Qty: 1 | Refills: 0 | Status: DISCONTINUED | COMMUNITY
Start: 2022-03-25

## 2022-07-27 RX ORDER — TRAMADOL HYDROCHLORIDE 50 MG/1
50 TABLET, COATED ORAL 3 TIMES DAILY
Qty: 30 | Refills: 0 | Status: DISCONTINUED | COMMUNITY
Start: 2022-04-13 | End: 2022-07-27

## 2022-08-02 NOTE — HISTORY OF PRESENT ILLNESS
[FreeTextEntry1] : hypertension and dyslipidemia , Diabetes Mellitus \par \par \par This is a 71 year old woman with history of Uncontrolled Brittle Diabetes Mellitus and hypertension and dyslipidemia ,Multiple sclerosis, Peripheral vascular disease ,  here for coronary artery disease evaluation. \par She has been feelign ok.  she uses a walker,.   she has left leg weakness and numbness.  she denies chest pain . no dyspnea on exertion no .syncope.  \par no palptiations.  a\par \par she said she had a non healing ulcer on the left foot.  and she had a angioplasty done 3 mths ago.  and the ulcer healked.  no pain in the left leg.  \par \par 2 mths ago , she had an apeisode of difficulty speaking. not sure if that was tia. she had carodit dUplex done. no stenosis

## 2022-08-02 NOTE — DISCUSSION/SUMMARY
[Patient] : the patient [Risks] : risks [Benefits] : benefits [Alternatives] : alternatives [___ Month(s)] : in [unfilled] month(s) [EKG obtained to assist in diagnosis and management of assessed problem(s)] : EKG obtained to assist in diagnosis and management of assessed problem(s) [With Me] : with me [FreeTextEntry1] : his is a 71 year old woman with history of Uncontrolled Brittle Diabetes Mellitus and hypertension and dyslipidemia ,Multiple sclerosis, Peripheral vascular disease ,  here for coronary artery disease evaluation. \par \par \par 1) coronary artery disease : intermediate risk factors for coronary artery disease.   echocardiogram with contrast if needed.   Nuclear stress test with IV technetium radiotracer. \par 2)  Peripheral vascular disease : s/p intervention in the left leg. aspirin . statins,. plavix.  willfollow uo with dr. guillory \par 3)  transient ischemic attack : ?? no definite diagnosis.  MS vs. TIA. carorid DUplex unremarkable 4 weeks MCOT monitor. \par 4)  hypertension : ct current meds. \par 5) dyslipidemia : ct statins \par 6) Will order and review ECG for the above mentioned diagnosis/condition/symptoms  a\par

## 2022-08-02 NOTE — CARDIOLOGY SUMMARY
[de-identified] : 7 27 2022    Sinus  Rhythm  \par Short MD . \par Otherwise Unremarkable \par    [de-identified] : left leg : s/p intervention of the left leg.  .  [de-identified] : nov 2021:  carotid DUplex. .normal .  [de-identified] : lipid profile:   LDL 84  HDL 80

## 2022-08-16 ENCOUNTER — RX RENEWAL (OUTPATIENT)
Age: 72
End: 2022-08-16

## 2022-08-22 ENCOUNTER — APPOINTMENT (OUTPATIENT)
Dept: CARDIOLOGY | Facility: CLINIC | Age: 72
End: 2022-08-22

## 2022-08-22 PROCEDURE — 93015 CV STRESS TEST SUPVJ I&R: CPT

## 2022-08-22 PROCEDURE — 93306 TTE W/DOPPLER COMPLETE: CPT

## 2022-08-22 PROCEDURE — A9500: CPT

## 2022-08-22 PROCEDURE — 78452 HT MUSCLE IMAGE SPECT MULT: CPT

## 2022-09-15 ENCOUNTER — RX RENEWAL (OUTPATIENT)
Age: 72
End: 2022-09-15

## 2022-10-31 ENCOUNTER — OFFICE (OUTPATIENT)
Dept: URBAN - METROPOLITAN AREA CLINIC 1 | Facility: CLINIC | Age: 72
Setting detail: OPHTHALMOLOGY
End: 2022-10-31
Payer: MEDICARE

## 2022-10-31 DIAGNOSIS — E11.9: ICD-10-CM

## 2022-10-31 DIAGNOSIS — H52.4: ICD-10-CM

## 2022-10-31 DIAGNOSIS — H01.002: ICD-10-CM

## 2022-10-31 DIAGNOSIS — H25.13: ICD-10-CM

## 2022-10-31 DIAGNOSIS — H01.001: ICD-10-CM

## 2022-10-31 DIAGNOSIS — H01.005: ICD-10-CM

## 2022-10-31 DIAGNOSIS — H01.004: ICD-10-CM

## 2022-10-31 PROCEDURE — 92004 COMPRE OPH EXAM NEW PT 1/>: CPT | Performed by: OPHTHALMOLOGY

## 2022-10-31 PROCEDURE — 92015 DETERMINE REFRACTIVE STATE: CPT | Performed by: OPHTHALMOLOGY

## 2022-10-31 ASSESSMENT — SPHEQUIV_DERIVED
OS_SPHEQUIV: -0.625
OD_SPHEQUIV: 1.875
OD_SPHEQUIV: 1.625

## 2022-10-31 ASSESSMENT — CONFRONTATIONAL VISUAL FIELD TEST (CVF)
OS_FINDINGS: FULL
OD_FINDINGS: FULL

## 2022-10-31 ASSESSMENT — LID EXAM ASSESSMENTS
OS_BLEPHARITIS: LLL LUL
OD_BLEPHARITIS: RLL RUL

## 2022-10-31 ASSESSMENT — REFRACTION_AUTOREFRACTION
OD_AXIS: 040
OS_AXIS: 145
OD_SPHERE: +2.25
OD_CYLINDER: -0.75
OS_CYLINDER: -1.75
OS_SPHERE: +0.25

## 2022-10-31 ASSESSMENT — REFRACTION_MANIFEST
OD_AXIS: 040
OS_SPHERE: PLANO
OS_CYLINDER: -1.25
OD_ADD: +2.50
OS_ADD: +2.50
OD_CYLINDER: -0.75
OD_VA1: 20/25-2
OD_SPHERE: +2.00
OS_AXIS: 145
OS_VA1: 20/25-2
OU_VA: 20/25-2

## 2022-10-31 ASSESSMENT — REFRACTION_CURRENTRX
OD_CYLINDER: -1.00
OS_SPHERE: +2.25
OS_OVR_VA: 20/
OD_OVR_VA: 20/
OD_ADD: +3.25
OS_ADD: +3.25
OS_AXIS: 145
OD_SPHERE: +2.25
OS_CYLINDER: -1.00
OD_VPRISM_DIRECTION: PROGS
OS_VPRISM_DIRECTION: PROGS
OD_AXIS: 180

## 2022-10-31 ASSESSMENT — TONOMETRY
OS_IOP_MMHG: 15
OD_IOP_MMHG: 15

## 2022-10-31 ASSESSMENT — VISUAL ACUITY
OS_BCVA: 20/30-2
OD_BCVA: 20/80-2

## 2022-11-02 ENCOUNTER — APPOINTMENT (OUTPATIENT)
Dept: FAMILY MEDICINE | Facility: CLINIC | Age: 72
End: 2022-11-02

## 2022-11-02 VITALS — RESPIRATION RATE: 16 BRPM | SYSTOLIC BLOOD PRESSURE: 130 MMHG | DIASTOLIC BLOOD PRESSURE: 70 MMHG | HEART RATE: 78 BPM

## 2022-11-02 VITALS
SYSTOLIC BLOOD PRESSURE: 143 MMHG | WEIGHT: 125 LBS | BODY MASS INDEX: 22.15 KG/M2 | HEART RATE: 90 BPM | DIASTOLIC BLOOD PRESSURE: 64 MMHG | TEMPERATURE: 97.2 F | OXYGEN SATURATION: 98 % | HEIGHT: 63 IN

## 2022-11-02 DIAGNOSIS — F51.04 PSYCHOPHYSIOLOGIC INSOMNIA: ICD-10-CM

## 2022-11-02 PROCEDURE — 99214 OFFICE O/P EST MOD 30 MIN: CPT

## 2022-11-02 RX ORDER — PEN NEEDLE, DIABETIC 31 G X1/4"
32G X 4 MM NEEDLE, DISPOSABLE MISCELLANEOUS
Qty: 360 | Refills: 0 | Status: ACTIVE | COMMUNITY
Start: 2022-10-28

## 2022-11-02 RX ORDER — ISOPRPOPYL ALCOHOL 70 ML/100ML
70 SWAB TOPICAL
Qty: 360 | Refills: 0 | Status: ACTIVE | COMMUNITY
Start: 2022-10-28

## 2022-11-02 RX ORDER — IBUPROFEN 600 MG/1
600 TABLET, FILM COATED ORAL
Qty: 30 | Refills: 0 | Status: COMPLETED | COMMUNITY
Start: 2022-04-20 | End: 2022-11-02

## 2022-11-02 NOTE — PHYSICAL EXAM
[No Acute Distress] : no acute distress [PERRL] : pupils equal round and reactive to light [Normal TMs] : both tympanic membranes were normal [Supple] : supple [Clear to Auscultation] : lungs were clear to auscultation bilaterally [Regular Rhythm] : with a regular rhythm [No Edema] : there was no peripheral edema [Normal] : soft, non-tender, non-distended, no masses palpated, no HSM and normal bowel sounds [de-identified] : left lateral malleolus  ulcer  much smller 2 mm diameter

## 2022-11-02 NOTE — HISTORY OF PRESENT ILLNESS
[Diabetes Mellitus] : Diabetes Mellitus [Hyperlipidemia] : Hyperlipidemia [Hypertension] : Hypertension [Spouse] : spouse [Family Member] : family member [Check glucose ___ x/day] : Patient checks  blood glucose [unfilled]  times a day [Most Recent A1C: ___] : Most recent A1C was [unfilled] [Does not check BP] : The patient is not checking blood pressure [<140/90] : Target blood pressure is <140/90 [Target goal met] : BP target goal met [Doing Well] : Patient is doing well [Low Intensity Therapy] : Patient is currently on low intensity statin  therapy [FreeTextEntry6] : seeing vascular for ulcer has not healed yet pt c/o trouble sleeping can not shut off mind  pt has  nausea q day  [de-identified] : sees  endo  [FreeTextEntry1] : goes to PT 2 x a wk for MS

## 2022-11-02 NOTE — ASSESSMENT
[FreeTextEntry1] : discussed  sleep  hygiene sleep compression no tv or  reading in bed no exercise too  close  to bed time no  exposure to bright lights  no coffee in afternoon \par 9 mg of melatonin hs  cad continue asa and plavix nausea  stop ibuprofen and pantoprozol dm continue treseba  check a1c hld continue atorvastatin

## 2022-11-03 LAB
ALBUMIN SERPL ELPH-MCNC: 4.5 G/DL
ALP BLD-CCNC: 104 U/L
ALT SERPL-CCNC: 14 U/L
ANION GAP SERPL CALC-SCNC: 12 MMOL/L
APPEARANCE: CLEAR
AST SERPL-CCNC: 15 U/L
BACTERIA: NEGATIVE
BASOPHILS # BLD AUTO: 0.04 K/UL
BASOPHILS NFR BLD AUTO: 0.6 %
BILIRUB SERPL-MCNC: 0.2 MG/DL
BILIRUBIN URINE: NEGATIVE
BLOOD URINE: NEGATIVE
BUN SERPL-MCNC: 15 MG/DL
CALCIUM SERPL-MCNC: 10.1 MG/DL
CHLORIDE SERPL-SCNC: 103 MMOL/L
CHOLEST SERPL-MCNC: 202 MG/DL
CO2 SERPL-SCNC: 30 MMOL/L
COLOR: NORMAL
CREAT SERPL-MCNC: 0.72 MG/DL
CREAT SPEC-SCNC: 74 MG/DL
EGFR: 88 ML/MIN/1.73M2
EOSINOPHIL # BLD AUTO: 0.23 K/UL
EOSINOPHIL NFR BLD AUTO: 3.6 %
ESTIMATED AVERAGE GLUCOSE: 169 MG/DL
GLUCOSE QUALITATIVE U: ABNORMAL
GLUCOSE SERPL-MCNC: 43 MG/DL
HBA1C MFR BLD HPLC: 7.5 %
HCT VFR BLD CALC: 38.8 %
HDLC SERPL-MCNC: 67 MG/DL
HGB BLD-MCNC: 12.4 G/DL
HYALINE CASTS: 0 /LPF
IMM GRANULOCYTES NFR BLD AUTO: 0.2 %
KETONES URINE: NEGATIVE
LDLC SERPL CALC-MCNC: 106 MG/DL
LEUKOCYTE ESTERASE URINE: ABNORMAL
LYMPHOCYTES # BLD AUTO: 1.7 K/UL
LYMPHOCYTES NFR BLD AUTO: 26.6 %
MAN DIFF?: NORMAL
MCHC RBC-ENTMCNC: 30.3 PG
MCHC RBC-ENTMCNC: 32 GM/DL
MCV RBC AUTO: 94.9 FL
MICROALBUMIN 24H UR DL<=1MG/L-MCNC: 1.5 MG/DL
MICROALBUMIN/CREAT 24H UR-RTO: 20 MG/G
MICROSCOPIC-UA: NORMAL
MONOCYTES # BLD AUTO: 0.58 K/UL
MONOCYTES NFR BLD AUTO: 9.1 %
NEUTROPHILS # BLD AUTO: 3.83 K/UL
NEUTROPHILS NFR BLD AUTO: 59.9 %
NITRITE URINE: NEGATIVE
NONHDLC SERPL-MCNC: 135 MG/DL
PH URINE: 6
PLATELET # BLD AUTO: 276 K/UL
POTASSIUM SERPL-SCNC: 4.9 MMOL/L
PROT SERPL-MCNC: 7.1 G/DL
PROTEIN URINE: NORMAL
RBC # BLD: 4.09 M/UL
RBC # FLD: 12.6 %
RED BLOOD CELLS URINE: 3 /HPF
SODIUM SERPL-SCNC: 145 MMOL/L
SPECIFIC GRAVITY URINE: 1.02
SQUAMOUS EPITHELIAL CELLS: 2 /HPF
T4 SERPL-MCNC: 7 UG/DL
TRIGL SERPL-MCNC: 142 MG/DL
TSH SERPL-ACNC: 1.79 UIU/ML
URATE SERPL-MCNC: 4.7 MG/DL
UROBILINOGEN URINE: NORMAL
WBC # FLD AUTO: 6.39 K/UL
WHITE BLOOD CELLS URINE: 9 /HPF

## 2022-11-07 ENCOUNTER — RX RENEWAL (OUTPATIENT)
Age: 72
End: 2022-11-07

## 2022-11-28 ENCOUNTER — APPOINTMENT (OUTPATIENT)
Dept: FAMILY MEDICINE | Facility: CLINIC | Age: 72
End: 2022-11-28

## 2022-11-28 VITALS — HEART RATE: 80 BPM | RESPIRATION RATE: 16 BRPM | SYSTOLIC BLOOD PRESSURE: 130 MMHG | DIASTOLIC BLOOD PRESSURE: 80 MMHG

## 2022-11-28 VITALS
TEMPERATURE: 96 F | HEIGHT: 63.5 IN | BODY MASS INDEX: 21.87 KG/M2 | DIASTOLIC BLOOD PRESSURE: 72 MMHG | OXYGEN SATURATION: 98 % | HEART RATE: 87 BPM | SYSTOLIC BLOOD PRESSURE: 160 MMHG | WEIGHT: 125 LBS

## 2022-11-28 DIAGNOSIS — R11.0 NAUSEA: ICD-10-CM

## 2022-11-28 PROCEDURE — 99214 OFFICE O/P EST MOD 30 MIN: CPT

## 2022-11-28 NOTE — ASSESSMENT
[FreeTextEntry1] : nausea gastritis zofran and pantoprozole bp acceptable continue amlodipine  hld continue atorvastatin dm good control a1c  7.4 in pt with MS

## 2022-11-28 NOTE — HISTORY OF PRESENT ILLNESS
[FreeTextEntry8] : nausea and epigastric pain for 1 wk no temp no diarrhea no dysuria glucose  fluctuates

## 2022-11-28 NOTE — PHYSICAL EXAM
[No Acute Distress] : no acute distress [PERRL] : pupils equal round and reactive to light [Normal TMs] : both tympanic membranes were normal [Supple] : supple [Clear to Auscultation] : lungs were clear to auscultation bilaterally [Regular Rhythm] : with a regular rhythm [Soft] : abdomen soft [Non-distended] : non-distended [No Masses] : no abdominal mass palpated [Normal Bowel Sounds] : normal bowel sounds [Normal Affect] : the affect was normal [de-identified] : tender epigastrium

## 2022-12-14 ENCOUNTER — APPOINTMENT (OUTPATIENT)
Dept: CARDIOLOGY | Facility: CLINIC | Age: 72
End: 2022-12-14

## 2022-12-14 ENCOUNTER — NON-APPOINTMENT (OUTPATIENT)
Age: 72
End: 2022-12-14

## 2022-12-14 VITALS
SYSTOLIC BLOOD PRESSURE: 122 MMHG | HEIGHT: 63 IN | WEIGHT: 120 LBS | DIASTOLIC BLOOD PRESSURE: 58 MMHG | OXYGEN SATURATION: 97 % | BODY MASS INDEX: 21.26 KG/M2 | HEART RATE: 75 BPM | TEMPERATURE: 97.4 F

## 2022-12-14 DIAGNOSIS — G45.9 TRANSIENT CEREBRAL ISCHEMIC ATTACK, UNSPECIFIED: ICD-10-CM

## 2022-12-14 PROCEDURE — 99215 OFFICE O/P EST HI 40 MIN: CPT

## 2022-12-14 PROCEDURE — 93000 ELECTROCARDIOGRAM COMPLETE: CPT

## 2022-12-14 RX ORDER — ASPIRIN 81 MG/1
81 TABLET, COATED ORAL
Refills: 0 | Status: DISCONTINUED | COMMUNITY
Start: 2021-10-27 | End: 2022-12-14

## 2022-12-14 RX ORDER — KETOCONAZOLE 20 MG/G
2 CREAM TOPICAL
Qty: 30 | Refills: 0 | Status: DISCONTINUED | COMMUNITY
Start: 2022-10-14 | End: 2022-12-14

## 2022-12-14 RX ORDER — INSULIN DEGLUDEC INJECTION 200 U/ML
200 INJECTION, SOLUTION SUBCUTANEOUS
Refills: 0 | Status: DISCONTINUED | COMMUNITY
Start: 2020-02-12 | End: 2022-12-14

## 2022-12-27 ENCOUNTER — APPOINTMENT (OUTPATIENT)
Dept: FAMILY MEDICINE | Facility: CLINIC | Age: 72
End: 2022-12-27

## 2022-12-27 PROCEDURE — 99447 NTRPROF PH1/NTRNET/EHR 11-20: CPT | Mod: CS

## 2022-12-27 NOTE — HISTORY OF PRESENT ILLNESS
[Home] : at home, [unfilled] , at the time of the visit. [Medical Office: ([facility])___] : at the medical office located in  [Verbal consent obtained from patient] : the patient, [unfilled] [FreeTextEntry8] : tested pos for covid today ill since yesterday cough fatigue body aches no dyspnea paxlovid contraindicated based on med list lagevrio ordered pt will get pulse ox

## 2023-01-19 ENCOUNTER — RX RENEWAL (OUTPATIENT)
Age: 73
End: 2023-01-19

## 2023-02-19 ENCOUNTER — RX RENEWAL (OUTPATIENT)
Age: 73
End: 2023-02-19

## 2023-03-27 ENCOUNTER — APPOINTMENT (OUTPATIENT)
Dept: FAMILY MEDICINE | Facility: CLINIC | Age: 73
End: 2023-03-27
Payer: MEDICARE

## 2023-03-27 VITALS
DIASTOLIC BLOOD PRESSURE: 70 MMHG | SYSTOLIC BLOOD PRESSURE: 148 MMHG | WEIGHT: 125 LBS | HEIGHT: 63 IN | BODY MASS INDEX: 22.15 KG/M2 | HEART RATE: 74 BPM | TEMPERATURE: 97.3 F | OXYGEN SATURATION: 97 %

## 2023-03-27 VITALS — DIASTOLIC BLOOD PRESSURE: 80 MMHG | RESPIRATION RATE: 16 BRPM | HEART RATE: 72 BPM | SYSTOLIC BLOOD PRESSURE: 140 MMHG

## 2023-03-27 PROCEDURE — 99214 OFFICE O/P EST MOD 30 MIN: CPT

## 2023-03-27 RX ORDER — MOLNUPIRAVIR 200 MG/1
200 CAPSULE ORAL
Qty: 40 | Refills: 0 | Status: COMPLETED | COMMUNITY
Start: 2022-12-27 | End: 2023-03-27

## 2023-03-27 RX ORDER — AMOXICILLIN AND CLAVULANATE POTASSIUM 500; 125 MG/1; MG/1
500-125 TABLET, FILM COATED ORAL
Qty: 14 | Refills: 0 | Status: COMPLETED | COMMUNITY
Start: 2023-02-17

## 2023-03-27 RX ORDER — COVID-19 ANTIGEN TEST
KIT MISCELLANEOUS
Qty: 8 | Refills: 0 | Status: COMPLETED | COMMUNITY
Start: 2023-01-06

## 2023-03-27 RX ORDER — MUPIROCIN 20 MG/G
2 OINTMENT TOPICAL
Qty: 22 | Refills: 0 | Status: COMPLETED | COMMUNITY
Start: 2023-02-10

## 2023-03-27 NOTE — HISTORY OF PRESENT ILLNESS
[Diabetes Mellitus] : Diabetes Mellitus [Hyperlipidemia] : Hyperlipidemia [Hypertension] : Hypertension [Spouse] : spouse [Family Member] : family member [Check glucose ___ x/day] : Patient checks  blood glucose [unfilled]  times a day [Most Recent A1C: ___] : Most recent A1C was [unfilled] [Does not check BP] : The patient is not checking blood pressure [<140/90] : Target blood pressure is <140/90 [Target goal met] : BP target goal met [Doing Well] : Patient is doing well [Low Intensity Therapy] : Patient is currently on low intensity statin  therapy [FreeTextEntry6] : leg  ulcer  better c/o  muscle aches  [de-identified] : sees  endo  [FreeTextEntry1] : goes to PT 2 x a wk for MS

## 2023-03-27 NOTE — ASSESSMENT
[FreeTextEntry1] : pad  ulcer  healed hld continue atorvastatin dm check a1c continue tresiba  bp acceptable  continue ramipril and dyazide cad continue asa ask cardiol  re continuation of plavix

## 2023-03-28 LAB
ALBUMIN SERPL ELPH-MCNC: 4.6 G/DL
ALP BLD-CCNC: 106 U/L
ALT SERPL-CCNC: 11 U/L
ANION GAP SERPL CALC-SCNC: 11 MMOL/L
APPEARANCE: CLEAR
AST SERPL-CCNC: 15 U/L
BACTERIA: NEGATIVE
BASOPHILS # BLD AUTO: 0.03 K/UL
BASOPHILS NFR BLD AUTO: 0.4 %
BILIRUB SERPL-MCNC: <0.2 MG/DL
BILIRUBIN URINE: NEGATIVE
BLOOD URINE: NEGATIVE
BUN SERPL-MCNC: 14 MG/DL
CALCIUM SERPL-MCNC: 10.3 MG/DL
CHLORIDE SERPL-SCNC: 102 MMOL/L
CHOLEST SERPL-MCNC: 207 MG/DL
CO2 SERPL-SCNC: 29 MMOL/L
COLOR: NORMAL
CREAT SERPL-MCNC: 0.6 MG/DL
CREAT SPEC-SCNC: 43 MG/DL
EGFR: 94 ML/MIN/1.73M2
EOSINOPHIL # BLD AUTO: 0.21 K/UL
EOSINOPHIL NFR BLD AUTO: 2.8 %
ESTIMATED AVERAGE GLUCOSE: 166 MG/DL
GLUCOSE QUALITATIVE U: NEGATIVE
GLUCOSE SERPL-MCNC: 92 MG/DL
HBA1C MFR BLD HPLC: 7.4 %
HCT VFR BLD CALC: 40.1 %
HDLC SERPL-MCNC: 78 MG/DL
HGB BLD-MCNC: 12.7 G/DL
HYALINE CASTS: 0 /LPF
IMM GRANULOCYTES NFR BLD AUTO: 0.1 %
KETONES URINE: NEGATIVE
LDLC SERPL CALC-MCNC: 111 MG/DL
LEUKOCYTE ESTERASE URINE: ABNORMAL
LYMPHOCYTES # BLD AUTO: 1.77 K/UL
LYMPHOCYTES NFR BLD AUTO: 23.5 %
MAN DIFF?: NORMAL
MCHC RBC-ENTMCNC: 29.3 PG
MCHC RBC-ENTMCNC: 31.7 GM/DL
MCV RBC AUTO: 92.4 FL
MICROALBUMIN 24H UR DL<=1MG/L-MCNC: <1.2 MG/DL
MICROALBUMIN/CREAT 24H UR-RTO: NORMAL MG/G
MICROSCOPIC-UA: NORMAL
MONOCYTES # BLD AUTO: 0.63 K/UL
MONOCYTES NFR BLD AUTO: 8.4 %
NEUTROPHILS # BLD AUTO: 4.89 K/UL
NEUTROPHILS NFR BLD AUTO: 64.8 %
NITRITE URINE: NEGATIVE
NONHDLC SERPL-MCNC: 129 MG/DL
PH URINE: 7
PLATELET # BLD AUTO: 261 K/UL
POTASSIUM SERPL-SCNC: 5.1 MMOL/L
PROT SERPL-MCNC: 7 G/DL
PROTEIN URINE: NEGATIVE
RBC # BLD: 4.34 M/UL
RBC # FLD: 12.8 %
RED BLOOD CELLS URINE: 1 /HPF
SODIUM SERPL-SCNC: 142 MMOL/L
SPECIFIC GRAVITY URINE: 1.01
SQUAMOUS EPITHELIAL CELLS: 1 /HPF
T4 SERPL-MCNC: 6.1 UG/DL
TRIGL SERPL-MCNC: 90 MG/DL
TSH SERPL-ACNC: 2.02 UIU/ML
URATE SERPL-MCNC: 3.7 MG/DL
UROBILINOGEN URINE: NORMAL
WBC # FLD AUTO: 7.54 K/UL
WHITE BLOOD CELLS URINE: 4 /HPF

## 2023-03-29 ENCOUNTER — APPOINTMENT (OUTPATIENT)
Dept: OPHTHALMOLOGY | Facility: CLINIC | Age: 73
End: 2023-03-29
Payer: MEDICARE

## 2023-03-29 ENCOUNTER — NON-APPOINTMENT (OUTPATIENT)
Age: 73
End: 2023-03-29

## 2023-03-29 PROCEDURE — 92004 COMPRE OPH EXAM NEW PT 1/>: CPT

## 2023-04-05 ENCOUNTER — RX RENEWAL (OUTPATIENT)
Age: 73
End: 2023-04-05

## 2023-04-10 ENCOUNTER — RX RENEWAL (OUTPATIENT)
Age: 73
End: 2023-04-10

## 2023-05-11 ENCOUNTER — RX RENEWAL (OUTPATIENT)
Age: 73
End: 2023-05-11

## 2023-05-14 ENCOUNTER — RX RENEWAL (OUTPATIENT)
Age: 73
End: 2023-05-14

## 2023-05-28 ENCOUNTER — RX RENEWAL (OUTPATIENT)
Age: 73
End: 2023-05-28

## 2023-05-28 RX ORDER — PANTOPRAZOLE 40 MG/1
40 TABLET, DELAYED RELEASE ORAL
Qty: 30 | Refills: 2 | Status: ACTIVE | COMMUNITY
Start: 2022-11-02 | End: 1900-01-01

## 2023-06-22 ENCOUNTER — RX RENEWAL (OUTPATIENT)
Age: 73
End: 2023-06-22

## 2023-07-19 ENCOUNTER — APPOINTMENT (OUTPATIENT)
Dept: OPHTHALMOLOGY | Facility: CLINIC | Age: 73
End: 2023-07-19

## 2023-07-26 ENCOUNTER — APPOINTMENT (OUTPATIENT)
Dept: FAMILY MEDICINE | Facility: CLINIC | Age: 73
End: 2023-07-26
Payer: MEDICARE

## 2023-07-26 VITALS — DIASTOLIC BLOOD PRESSURE: 80 MMHG | RESPIRATION RATE: 16 BRPM | HEART RATE: 72 BPM | SYSTOLIC BLOOD PRESSURE: 134 MMHG

## 2023-07-26 VITALS
SYSTOLIC BLOOD PRESSURE: 142 MMHG | HEART RATE: 80 BPM | HEIGHT: 63 IN | BODY MASS INDEX: 23.21 KG/M2 | WEIGHT: 131 LBS | OXYGEN SATURATION: 98 % | DIASTOLIC BLOOD PRESSURE: 80 MMHG | TEMPERATURE: 97.6 F

## 2023-07-26 DIAGNOSIS — E05.90 THYROTOXICOSIS, UNSPECIFIED W/OUT THYROTOXIC CRISIS OR STORM: ICD-10-CM

## 2023-07-26 DIAGNOSIS — G35 MULTIPLE SCLEROSIS: ICD-10-CM

## 2023-07-26 PROCEDURE — 99214 OFFICE O/P EST MOD 30 MIN: CPT | Mod: 25

## 2023-07-26 PROCEDURE — G0444 DEPRESSION SCREEN ANNUAL: CPT | Mod: 59

## 2023-07-26 PROCEDURE — G0439: CPT

## 2023-07-26 PROCEDURE — 99497 ADVNCD CARE PLAN 30 MIN: CPT

## 2023-07-26 RX ORDER — IBUPROFEN 800 MG/1
800 TABLET, FILM COATED ORAL
Qty: 60 | Refills: 0 | Status: COMPLETED | COMMUNITY
Start: 2016-12-16 | End: 2023-07-26

## 2023-07-26 NOTE — REVIEW OF SYSTEMS
[Patient Intake Form Reviewed] : Patient intake form was reviewed [Muscle Weakness] : muscle weakness [Muscle Pain] : muscle pain [Negative] : Heme/Lymph

## 2023-07-26 NOTE — PHYSICAL EXAM
[No Acute Distress] : no acute distress [PERRL] : pupils equal round and reactive to light [Normal TMs] : both tympanic membranes were normal [Supple] : supple [Clear to Auscultation] : lungs were clear to auscultation bilaterally [Regular Rhythm] : with a regular rhythm [No Murmur] : no murmur heard [No Edema] : there was no peripheral edema [Normal Supraclavicular Nodes] : no supraclavicular lymphadenopathy [Normal Anterior Cervical Nodes] : no anterior cervical lymphadenopathy [Normal] : no joint swelling and grossly normal strength and tone [Normal Affect] : the affect was normal [Normal Insight/Judgement] : insight and judgment were intact [de-identified] : 8 MM DIAMETER LEFT LATERAL MALEOLUS ISCHIC ULCER  [de-identified] : UNSTEADY GAIT

## 2023-07-26 NOTE — HISTORY OF PRESENT ILLNESS
[FreeTextEntry1] : pt here for cpe and management of hld htn cad and MS  [de-identified] : PT C/O  LEG PAINS HAS LESION LEFT LATERAL ANKLE

## 2023-07-26 NOTE — HEALTH RISK ASSESSMENT
[Fair] :  ~his/her~ mood as fair [Yes] : Yes [Monthly or less (1 pt)] : Monthly or less (1 point) [No falls in past year] : Patient reported no falls in the past year [0] : 2) Feeling down, depressed, or hopeless: Not at all (0) [PHQ-2 Negative - No further assessment needed] : PHQ-2 Negative - No further assessment needed [None] : None [With Family] : lives with family [With Patient/Caregiver] : , with patient/caregiver [Name: ___] : Health Care Proxy's Name: [unfilled]  [Relationship: ___] : Relationship: [unfilled] [Time Spent: ___ minutes] : Time Spent: [unfilled] minutes [Never] : Never [de-identified] : NO  [de-identified] : NO  [de-identified] : multiple issues  discussed  to determine  if any depression is present 5 min spent [[id number]] : 0 [Change in mental status noted] : No change in mental status noted [ColonoscopyDate] : 1/15 [AdvancecareDate] : 7/23 [FreeTextEntry4] : 16 minutes  spent discussing  end of life care and options for treatment such as, a DNR, DNI, dialysis, tube feeds and if patient becomes unable to make decisions for self and has incurable disease that treatment outweighs benefits.\par

## 2023-07-26 NOTE — ASSESSMENT
[FreeTextEntry1] : ISCHEMIC ULCER ANKLE SANTYL GIVEN INSTRUCTION ON WOUND CARE RTC 2 WKS  BP ACCEPTABLE CONTINUE AMLODIPINE HLD CONTINUE ATORVASTATIN DM FAIR CONTROL CONTINUE TRESEBA lab evaluation  CAD  CONTINUE PLAVIX \par

## 2023-07-27 LAB
ALBUMIN SERPL ELPH-MCNC: 4.5 G/DL
ALP BLD-CCNC: 113 U/L
ALT SERPL-CCNC: 12 U/L
ANION GAP SERPL CALC-SCNC: 12 MMOL/L
APPEARANCE: CLEAR
AST SERPL-CCNC: 14 U/L
BACTERIA: NEGATIVE /HPF
BILIRUB SERPL-MCNC: 0.2 MG/DL
BILIRUBIN URINE: NEGATIVE
BLOOD URINE: NEGATIVE
BUN SERPL-MCNC: 15 MG/DL
CALCIUM SERPL-MCNC: 10.2 MG/DL
CAST: 0 /LPF
CHLORIDE SERPL-SCNC: 101 MMOL/L
CHOLEST SERPL-MCNC: 182 MG/DL
CO2 SERPL-SCNC: 29 MMOL/L
COLOR: YELLOW
CREAT SERPL-MCNC: 0.6 MG/DL
CREAT SPEC-SCNC: 57 MG/DL
EGFR: 94 ML/MIN/1.73M2
EPITHELIAL CELLS: 1 /HPF
ESTIMATED AVERAGE GLUCOSE: 180 MG/DL
GLUCOSE QUALITATIVE U: 500 MG/DL
GLUCOSE SERPL-MCNC: 151 MG/DL
HBA1C MFR BLD HPLC: 7.9 %
HDLC SERPL-MCNC: 66 MG/DL
KETONES URINE: NEGATIVE MG/DL
LDLC SERPL CALC-MCNC: 99 MG/DL
LEUKOCYTE ESTERASE URINE: ABNORMAL
MICROALBUMIN 24H UR DL<=1MG/L-MCNC: <1.2 MG/DL
MICROALBUMIN/CREAT 24H UR-RTO: NORMAL MG/G
MICROSCOPIC-UA: NORMAL
NITRITE URINE: NEGATIVE
NONHDLC SERPL-MCNC: 116 MG/DL
PH URINE: 6.5
POTASSIUM SERPL-SCNC: 4.8 MMOL/L
PROT SERPL-MCNC: 7 G/DL
PROTEIN URINE: NEGATIVE MG/DL
RED BLOOD CELLS URINE: 1 /HPF
SODIUM SERPL-SCNC: 142 MMOL/L
SPECIFIC GRAVITY URINE: 1.02
T4 SERPL-MCNC: 6.5 UG/DL
TRIGL SERPL-MCNC: 91 MG/DL
TSH SERPL-ACNC: 1.96 UIU/ML
URATE SERPL-MCNC: 4.2 MG/DL
UROBILINOGEN URINE: 0.2 MG/DL
WHITE BLOOD CELLS URINE: 1 /HPF

## 2023-08-09 ENCOUNTER — APPOINTMENT (OUTPATIENT)
Dept: FAMILY MEDICINE | Facility: CLINIC | Age: 73
End: 2023-08-09

## 2023-08-14 ENCOUNTER — RX RENEWAL (OUTPATIENT)
Age: 73
End: 2023-08-14

## 2023-09-07 ENCOUNTER — RX RENEWAL (OUTPATIENT)
Age: 73
End: 2023-09-07

## 2023-09-07 RX ORDER — RAMIPRIL 10 MG/1
10 CAPSULE ORAL
Qty: 90 | Refills: 3 | Status: ACTIVE | COMMUNITY
Start: 2016-11-30 | End: 1900-01-01

## 2023-09-20 ENCOUNTER — NON-APPOINTMENT (OUTPATIENT)
Age: 73
End: 2023-09-20

## 2023-09-20 ENCOUNTER — APPOINTMENT (OUTPATIENT)
Dept: OPHTHALMOLOGY | Facility: CLINIC | Age: 73
End: 2023-09-20
Payer: MEDICARE

## 2023-09-20 ENCOUNTER — RX RENEWAL (OUTPATIENT)
Age: 73
End: 2023-09-20

## 2023-09-20 PROCEDURE — 92012 INTRM OPH EXAM EST PATIENT: CPT

## 2023-10-19 ENCOUNTER — RX RENEWAL (OUTPATIENT)
Age: 73
End: 2023-10-19

## 2023-10-29 ENCOUNTER — OFFICE (OUTPATIENT)
Dept: URBAN - METROPOLITAN AREA CLINIC 1 | Facility: CLINIC | Age: 73
Setting detail: OPHTHALMOLOGY
End: 2023-10-29
Payer: MEDICARE

## 2023-10-29 DIAGNOSIS — H01.004: ICD-10-CM

## 2023-10-29 DIAGNOSIS — H25.13: ICD-10-CM

## 2023-10-29 DIAGNOSIS — H01.001: ICD-10-CM

## 2023-10-29 DIAGNOSIS — H01.002: ICD-10-CM

## 2023-10-29 DIAGNOSIS — E11.9: ICD-10-CM

## 2023-10-29 DIAGNOSIS — H35.3131: ICD-10-CM

## 2023-10-29 DIAGNOSIS — H52.4: ICD-10-CM

## 2023-10-29 DIAGNOSIS — H01.005: ICD-10-CM

## 2023-10-29 PROBLEM — G50.0 TRIGEMINAL NEURALGIA: Status: ACTIVE | Noted: 2023-10-29

## 2023-10-29 PROBLEM — H16.231 NEUROTROPHIC KERATOCONJUNCTIVITIS; RIGHT EYE: Status: ACTIVE | Noted: 2023-10-29

## 2023-10-29 PROCEDURE — 92015 DETERMINE REFRACTIVE STATE: CPT | Performed by: OPHTHALMOLOGY

## 2023-10-29 PROCEDURE — 99214 OFFICE O/P EST MOD 30 MIN: CPT | Performed by: OPHTHALMOLOGY

## 2023-10-29 PROCEDURE — 92202 OPSCPY EXTND ON/MAC DRAW: CPT | Performed by: OPHTHALMOLOGY

## 2023-10-29 PROCEDURE — 92134 CPTRZ OPH DX IMG PST SGM RTA: CPT | Performed by: OPHTHALMOLOGY

## 2023-10-29 ASSESSMENT — REFRACTION_CURRENTRX
OS_AXIS: 150
OS_OVR_VA: 20/
OD_ADD: +2.75
OD_SPHERE: +1.00
OS_VPRISM_DIRECTION: PROGS
OD_OVR_VA: 20/
OS_ADD: +2.75
OD_AXIS: 015
OS_CYLINDER: -2.00
OD_VPRISM_DIRECTION: PROGS
OD_CYLINDER: -0.50
OS_SPHERE: +0.25

## 2023-10-29 ASSESSMENT — REFRACTION_MANIFEST
OU_VA: 20/25-2
OS_AXIS: 140
OD_SPHERE: +2.25
OS_SPHERE: PLANO
OD_CYLINDER: -1.00
OS_ADD: +2.75
OD_ADD: +2.75
OS_VA1: 20/30-2
OD_AXIS: 045
OS_CYLINDER: -1.25

## 2023-10-29 ASSESSMENT — CONFRONTATIONAL VISUAL FIELD TEST (CVF)
OS_FINDINGS: FULL
OD_FINDINGS: FULL

## 2023-10-29 ASSESSMENT — TONOMETRY
OD_IOP_MMHG: 14
OS_IOP_MMHG: 14

## 2023-10-29 ASSESSMENT — LID EXAM ASSESSMENTS
OD_BLEPHARITIS: RLL RUL
OS_BLEPHARITIS: LLL LUL

## 2023-10-29 ASSESSMENT — REFRACTION_AUTOREFRACTION
OS_CYLINDER: -1.25
OD_SPHERE: +2.25
OS_AXIS: 135
OS_SPHERE: PLANO
OD_AXIS: 045
OD_CYLINDER: -1.00

## 2023-10-29 ASSESSMENT — SPHEQUIV_DERIVED
OD_SPHEQUIV: 1.75
OD_SPHEQUIV: 1.75

## 2023-10-29 ASSESSMENT — VISUAL ACUITY
OD_BCVA: 20/40-2
OS_BCVA: 20/40-2

## 2023-10-29 ASSESSMENT — KERATOMETRY: METHOD_AUTO_MANUAL: AUTO

## 2023-11-09 ENCOUNTER — RX RENEWAL (OUTPATIENT)
Age: 73
End: 2023-11-09

## 2023-12-06 ENCOUNTER — APPOINTMENT (OUTPATIENT)
Dept: FAMILY MEDICINE | Facility: CLINIC | Age: 73
End: 2023-12-06
Payer: MEDICARE

## 2023-12-06 VITALS
BODY MASS INDEX: 23.04 KG/M2 | TEMPERATURE: 97.3 F | OXYGEN SATURATION: 98 % | HEIGHT: 63 IN | WEIGHT: 130 LBS | SYSTOLIC BLOOD PRESSURE: 128 MMHG | DIASTOLIC BLOOD PRESSURE: 68 MMHG | HEART RATE: 84 BPM

## 2023-12-06 DIAGNOSIS — J32.9 CHRONIC SINUSITIS, UNSPECIFIED: ICD-10-CM

## 2023-12-06 PROCEDURE — 99213 OFFICE O/P EST LOW 20 MIN: CPT

## 2024-01-22 ENCOUNTER — NON-APPOINTMENT (OUTPATIENT)
Age: 74
End: 2024-01-22

## 2024-01-24 ENCOUNTER — NON-APPOINTMENT (OUTPATIENT)
Age: 74
End: 2024-01-24

## 2024-02-05 ENCOUNTER — RX RENEWAL (OUTPATIENT)
Age: 74
End: 2024-02-05

## 2024-02-21 ENCOUNTER — APPOINTMENT (OUTPATIENT)
Dept: FAMILY MEDICINE | Facility: CLINIC | Age: 74
End: 2024-02-21
Payer: MEDICARE

## 2024-02-21 VITALS
WEIGHT: 130 LBS | HEIGHT: 63 IN | HEART RATE: 74 BPM | DIASTOLIC BLOOD PRESSURE: 70 MMHG | OXYGEN SATURATION: 98 % | SYSTOLIC BLOOD PRESSURE: 138 MMHG | TEMPERATURE: 98 F | BODY MASS INDEX: 23.04 KG/M2

## 2024-02-21 DIAGNOSIS — J01.00 ACUTE MAXILLARY SINUSITIS, UNSPECIFIED: ICD-10-CM

## 2024-02-21 DIAGNOSIS — Z13.31 ENCOUNTER FOR SCREENING FOR DEPRESSION: ICD-10-CM

## 2024-02-21 DIAGNOSIS — U07.1 COVID-19: ICD-10-CM

## 2024-02-21 DIAGNOSIS — Z23 ENCOUNTER FOR IMMUNIZATION: ICD-10-CM

## 2024-02-21 PROCEDURE — G0444 DEPRESSION SCREEN ANNUAL: CPT | Mod: 59

## 2024-02-21 PROCEDURE — 99213 OFFICE O/P EST LOW 20 MIN: CPT

## 2024-02-21 NOTE — ASSESSMENT
[FreeTextEntry1] : take Augmentin as directed with food recommend over the counter antihistamine  maintain fluid hydration nasal swab done to r/o COVID, Flu, RSV

## 2024-02-21 NOTE — PHYSICAL EXAM
[No Acute Distress] : no acute distress [Well Nourished] : well nourished [Well Developed] : well developed [Normal Oropharynx] : the oropharynx was normal [Normal TMs] : both tympanic membranes were normal [Normal Appearance] : was normal in appearance [Neck Supple] : was supple [No Respiratory Distress] : no respiratory distress  [Clear to Auscultation] : lungs were clear to auscultation bilaterally [Normal Rate] : normal rate  [Regular Rhythm] : with a regular rhythm [Normal S1, S2] : normal S1 and S2 [No Murmur] : no murmur heard [Normal Anterior Cervical Nodes] : no anterior cervical lymphadenopathy [Alert and Oriented x3] : oriented to person, place, and time [de-identified] : Bilateral maxillary sinus tenderness; Postnasal drip present [de-identified] : ambulates with a walker

## 2024-02-21 NOTE — HISTORY OF PRESENT ILLNESS
[FreeTextEntry8] :  73 year old female presents c/o 3 day h/o sinus congestion and pressure  no fever no cough reports associated headache did not test herself for COVID

## 2024-02-22 LAB
INFLUENZA A RESULT: NOT DETECTED
INFLUENZA B RESULT: NOT DETECTED
RESP SYN VIRUS RESULT: NOT DETECTED
SARS-COV-2 RESULT: NOT DETECTED

## 2024-02-23 ENCOUNTER — APPOINTMENT (OUTPATIENT)
Dept: CARDIOLOGY | Facility: CLINIC | Age: 74
End: 2024-02-23

## 2024-03-20 ENCOUNTER — APPOINTMENT (OUTPATIENT)
Dept: OPHTHALMOLOGY | Facility: CLINIC | Age: 74
End: 2024-03-20

## 2024-03-27 ENCOUNTER — APPOINTMENT (OUTPATIENT)
Dept: OPHTHALMOLOGY | Facility: CLINIC | Age: 74
End: 2024-03-27

## 2024-03-29 ENCOUNTER — APPOINTMENT (OUTPATIENT)
Dept: ORTHOPEDIC SURGERY | Facility: CLINIC | Age: 74
End: 2024-03-29
Payer: MEDICARE

## 2024-03-29 ENCOUNTER — RESULT CHARGE (OUTPATIENT)
Age: 74
End: 2024-03-29

## 2024-03-29 VITALS — WEIGHT: 130 LBS | BODY MASS INDEX: 23.04 KG/M2 | HEIGHT: 63 IN

## 2024-03-29 PROCEDURE — 72040 X-RAY EXAM NECK SPINE 2-3 VW: CPT

## 2024-03-29 PROCEDURE — 99214 OFFICE O/P EST MOD 30 MIN: CPT

## 2024-03-29 PROCEDURE — 73030 X-RAY EXAM OF SHOULDER: CPT | Mod: LT

## 2024-03-29 NOTE — ASSESSMENT
[FreeTextEntry1] : The patient is a 73 year old women with multilevel DDD with symptoms of radiculopathy.  The patient was explained the diagnosis and the treatment options.  She will begin Medrol dose donaldo.  She was counseled on the use of the medication.  Due to the patients DM status she is aware that she will need to monitor her sugars closely and is prepared to dose her insulin as needed.  The patient will obtain MRI of the cervical spine.  She will follow up with Dr. Kang for pain mgmt evaluation and treatment. All questions were answered prior to discharge.

## 2024-03-29 NOTE — PHYSICAL EXAM
[Flexion] : flexion [Facet arthropathy] : Facet arthropathy [Straightening consistent with spasm] : Straightening consistent with spasm [Disc space narrowing] : Disc space narrowing [Foraminal narrowing] : Foraminal narrowing [FreeTextEntry1] : Marked loss of disc height C4-C5 C5-C5 with end plate osteophyte formation [de-identified] : left lateral flexion 30 degrees [de-identified] : right lateral flexion 30 degrees [4 ___] : forward flexion 4[unfilled]/5 [4___] : external rotation 4[unfilled]/5 [5___] : internal rotation 5[unfilled]/5 [] : motor and sensory intact distally [Left] : left shoulder [There are no fractures, subluxations or dislocations. No significant abnormalities are seen] : There are no fractures, subluxations or dislocations. No significant abnormalities are seen [Type 2 acromion] : Type 2 acromion [AC Joint Arthrosis] : AC Joint Arthrosis [TWNoteComboBox7] : active forward flexion 145 degrees [de-identified] : active abduction 80 degrees [de-identified] : external rotation 60 degrees [TWNoteComboBox6] : internal rotation 65 degrees

## 2024-03-29 NOTE — HISTORY OF PRESENT ILLNESS
[7] : 7 [5] : 5 [Dull/Aching] : dull/aching [Sharp] : sharp [Intermittent] : intermittent [Nothing helps with pain getting better] : Nothing helps with pain getting better [de-identified] : The patient is a 73 year old women with a PMHX of MS, that has left shoulder and cervical spinal pain. Her pain has been present for the past 7 days. Her pain is intermittent in timing and moderate in severity. The patient reports loss of motion. She reports difficulty sleeping on her side due to pain. She reports intermittent paresthesia's into the hand. She reports trapezial muscle spasm. She is not using pain medications. She is accompanied by her .  [] : no [FreeTextEntry1] : LT Shoulder [FreeTextEntry5] : LT Shoulder

## 2024-03-30 ENCOUNTER — APPOINTMENT (OUTPATIENT)
Dept: MRI IMAGING | Facility: CLINIC | Age: 74
End: 2024-03-30
Payer: MEDICARE

## 2024-03-30 PROCEDURE — 72141 MRI NECK SPINE W/O DYE: CPT | Mod: MH

## 2024-04-01 ENCOUNTER — APPOINTMENT (OUTPATIENT)
Dept: ORTHOPEDIC SURGERY | Facility: CLINIC | Age: 74
End: 2024-04-01

## 2024-04-02 ENCOUNTER — APPOINTMENT (OUTPATIENT)
Dept: FAMILY MEDICINE | Facility: CLINIC | Age: 74
End: 2024-04-02
Payer: MEDICARE

## 2024-04-02 VITALS
WEIGHT: 130 LBS | BODY MASS INDEX: 22.75 KG/M2 | HEART RATE: 80 BPM | SYSTOLIC BLOOD PRESSURE: 122 MMHG | TEMPERATURE: 97.2 F | HEIGHT: 63.5 IN | OXYGEN SATURATION: 98 % | DIASTOLIC BLOOD PRESSURE: 70 MMHG

## 2024-04-02 VITALS — RESPIRATION RATE: 16 BRPM | DIASTOLIC BLOOD PRESSURE: 80 MMHG | SYSTOLIC BLOOD PRESSURE: 140 MMHG | HEART RATE: 78 BPM

## 2024-04-02 DIAGNOSIS — L97.909 PERIPHERAL VASCULAR DISEASE, UNSPECIFIED: ICD-10-CM

## 2024-04-02 DIAGNOSIS — F03.A4 UNSPECIFIED DEMENTIA, MILD, WITH ANXIETY: ICD-10-CM

## 2024-04-02 DIAGNOSIS — I73.9 PERIPHERAL VASCULAR DISEASE, UNSPECIFIED: ICD-10-CM

## 2024-04-02 DIAGNOSIS — F41.9 ANXIETY DISORDER, UNSPECIFIED: ICD-10-CM

## 2024-04-02 PROCEDURE — 99214 OFFICE O/P EST MOD 30 MIN: CPT

## 2024-04-02 PROCEDURE — G2211 COMPLEX E/M VISIT ADD ON: CPT

## 2024-04-02 RX ORDER — PREDNISONE 10 MG/1
10 TABLET ORAL
Qty: 1 | Refills: 0 | Status: COMPLETED | COMMUNITY
Start: 2023-12-06 | End: 2024-04-02

## 2024-04-02 RX ORDER — AMOXICILLIN AND CLAVULANATE POTASSIUM 875; 125 MG/1; MG/1
875-125 TABLET, COATED ORAL
Qty: 14 | Refills: 0 | Status: COMPLETED | COMMUNITY
Start: 2024-02-21 | End: 2024-04-02

## 2024-04-02 RX ORDER — COLLAGENASE SANTYL 250 [ARB'U]/G
250 OINTMENT TOPICAL DAILY
Qty: 1 | Refills: 0 | Status: COMPLETED | COMMUNITY
Start: 2023-07-26 | End: 2024-04-02

## 2024-04-02 RX ORDER — METHYLPREDNISOLONE 4 MG/1
4 TABLET ORAL
Qty: 1 | Refills: 0 | Status: COMPLETED | COMMUNITY
Start: 2024-03-30 | End: 2024-04-02

## 2024-04-02 NOTE — HISTORY OF PRESENT ILLNESS
[FreeTextEntry1] : PT NOT FEELING WELL  [de-identified] : HAS  TROUBLE WITH WORD FINDING AND FORGETFUL GLUCOSE HAS BEEN ELEVATED GOING FOR LEFT LEG IN 3 DAYS NOES NOT CHECK BP NO CP SOB

## 2024-04-02 NOTE — ASSESSMENT
[FreeTextEntry1] : MILD DEMENTIA  WILL START ARICEPT AND MRI BRAIN SLUMS 16 BP ACCEPTABLEW CONTINUE AMLODIPINE HLD CONTINUE ATORVASTATIN PVD CONTINUE PLAVIX DM CONTINUE TRESIBA lab evaluation

## 2024-04-02 NOTE — PHYSICAL EXAM
[No Acute Distress] : no acute distress [PERRL] : pupils equal round and reactive to light [Normal TMs] : both tympanic membranes were normal [Supple] : supple [Clear to Auscultation] : lungs were clear to auscultation bilaterally [Regular Rhythm] : with a regular rhythm [No Murmur] : no murmur heard [No Edema] : there was no peripheral edema [Normal Supraclavicular Nodes] : no supraclavicular lymphadenopathy [Normal Anterior Cervical Nodes] : no anterior cervical lymphadenopathy [Normal] : no joint swelling and grossly normal strength and tone [Normal Affect] : the affect was normal [Normal Insight/Judgement] : insight and judgment were intact [de-identified] : UNSTEADY GAIT  [de-identified] : 8 MM DIAMETER LEFT LATERAL MALEOLUS ISCHIC ULCER

## 2024-04-03 ENCOUNTER — NON-APPOINTMENT (OUTPATIENT)
Age: 74
End: 2024-04-03

## 2024-04-03 LAB
ALBUMIN SERPL ELPH-MCNC: 4.5 G/DL
ALP BLD-CCNC: 119 U/L
ALT SERPL-CCNC: 13 U/L
ANION GAP SERPL CALC-SCNC: 14 MMOL/L
APPEARANCE: CLEAR
AST SERPL-CCNC: 14 U/L
BACTERIA: NEGATIVE /HPF
BASOPHILS # BLD AUTO: 0.03 K/UL
BASOPHILS NFR BLD AUTO: 0.3 %
BILIRUB SERPL-MCNC: <0.2 MG/DL
BILIRUBIN URINE: NEGATIVE
BLOOD URINE: NEGATIVE
BUN SERPL-MCNC: 24 MG/DL
CALCIUM SERPL-MCNC: 10.1 MG/DL
CAST: 2 /LPF
CHLORIDE SERPL-SCNC: 101 MMOL/L
CHOLEST SERPL-MCNC: 184 MG/DL
CO2 SERPL-SCNC: 26 MMOL/L
COLOR: YELLOW
CREAT SERPL-MCNC: 0.76 MG/DL
CREAT SPEC-SCNC: 87 MG/DL
EGFR: 82 ML/MIN/1.73M2
EOSINOPHIL # BLD AUTO: 0.28 K/UL
EOSINOPHIL NFR BLD AUTO: 2.5 %
EPITHELIAL CELLS: 2 /HPF
ESTIMATED AVERAGE GLUCOSE: 200 MG/DL
GLUCOSE QUALITATIVE U: 500 MG/DL
GLUCOSE SERPL-MCNC: 83 MG/DL
HBA1C MFR BLD HPLC: 8.6 %
HCT VFR BLD CALC: 36.8 %
HDLC SERPL-MCNC: 68 MG/DL
HGB BLD-MCNC: 12.4 G/DL
IMM GRANULOCYTES NFR BLD AUTO: 0.3 %
KETONES URINE: NEGATIVE MG/DL
LDLC SERPL CALC-MCNC: 97 MG/DL
LEUKOCYTE ESTERASE URINE: ABNORMAL
LYMPHOCYTES # BLD AUTO: 1.48 K/UL
LYMPHOCYTES NFR BLD AUTO: 13.3 %
MAN DIFF?: NORMAL
MCHC RBC-ENTMCNC: 29.9 PG
MCHC RBC-ENTMCNC: 33.7 GM/DL
MCV RBC AUTO: 88.7 FL
MICROALBUMIN 24H UR DL<=1MG/L-MCNC: <1.2 MG/DL
MICROALBUMIN/CREAT 24H UR-RTO: NORMAL MG/G
MICROSCOPIC-UA: NORMAL
MONOCYTES # BLD AUTO: 0.93 K/UL
MONOCYTES NFR BLD AUTO: 8.4 %
NEUTROPHILS # BLD AUTO: 8.35 K/UL
NEUTROPHILS NFR BLD AUTO: 75.2 %
NITRITE URINE: NEGATIVE
NONHDLC SERPL-MCNC: 116 MG/DL
PH URINE: 7
PLATELET # BLD AUTO: 278 K/UL
POTASSIUM SERPL-SCNC: 5.2 MMOL/L
PROT SERPL-MCNC: 6.9 G/DL
PROTEIN URINE: NEGATIVE MG/DL
RBC # BLD: 4.15 M/UL
RBC # FLD: 12.5 %
RED BLOOD CELLS URINE: 1 /HPF
SODIUM SERPL-SCNC: 141 MMOL/L
SPECIFIC GRAVITY URINE: 1.02
T4 SERPL-MCNC: 6.6 UG/DL
TRIGL SERPL-MCNC: 112 MG/DL
TSH SERPL-ACNC: 2.54 UIU/ML
URATE SERPL-MCNC: 4 MG/DL
UROBILINOGEN URINE: 0.2 MG/DL
WBC # FLD AUTO: 11.1 K/UL
WHITE BLOOD CELLS URINE: 21 /HPF

## 2024-04-03 RX ORDER — INSULIN DEGLUDEC INJECTION 100 U/ML
100 INJECTION, SOLUTION SUBCUTANEOUS
Refills: 0 | Status: ACTIVE | COMMUNITY
Start: 2022-08-04

## 2024-04-06 ENCOUNTER — RX RENEWAL (OUTPATIENT)
Age: 74
End: 2024-04-06

## 2024-04-06 RX ORDER — DONEPEZIL HYDROCHLORIDE 5 MG/1
5 TABLET ORAL
Qty: 90 | Refills: 1 | Status: ACTIVE | COMMUNITY
Start: 2024-04-02 | End: 1900-01-01

## 2024-04-08 ENCOUNTER — APPOINTMENT (OUTPATIENT)
Dept: PAIN MANAGEMENT | Facility: CLINIC | Age: 74
End: 2024-04-08
Payer: MEDICARE

## 2024-04-08 VITALS — BODY MASS INDEX: 22.68 KG/M2 | WEIGHT: 128 LBS | HEIGHT: 63 IN

## 2024-04-08 DIAGNOSIS — M47.812 SPONDYLOSIS W/OUT MYELOPATHY OR RADICULOPATHY, CERVICAL REGION: ICD-10-CM

## 2024-04-08 DIAGNOSIS — M54.12 RADICULOPATHY, CERVICAL REGION: ICD-10-CM

## 2024-04-08 DIAGNOSIS — Z87.39 PERSONAL HISTORY OF OTHER DISEASES OF THE MUSCULOSKELETAL SYSTEM AND CONNECTIVE TISSUE: ICD-10-CM

## 2024-04-08 PROCEDURE — 99204 OFFICE O/P NEW MOD 45 MIN: CPT

## 2024-04-08 NOTE — HISTORY OF PRESENT ILLNESS
[Neck] : neck [Sudden] : sudden [10] : 10 [Dull/Aching] : dull/aching [Intermittent] : intermittent [Household chores] : household chores [Leisure] : leisure [Sleep] : sleep [Social interactions] : social interactions [FreeTextEntry1] : The patient presents for initial evaluation regarding their neck pain.   She is accompanied by her  who assists with providing history.  Patient was referred by Urgent Care; NIRANJAN Mata.  Patient has a long-standing history of neck pain but is having a recent severe worsening over the past 2 weeks.  She denies any particular inciting or exacerbating events although she reports recent travel home from [place].  Her current pain is mostly across the neck (R>L) with radiation to the left shoulder and bicep, pain does not travel past the elbow.  She denies any paresthesias.  She is on chronic anticoagulation for vascular disease; she underwent recent attempt at revascularization of the left lower extremity about 1 week ago.  She is currently on Plavix.  Subjective Weakness: No  Numbness/Tingling: Yes  Bladder/Bowel dysfunction: No  Gait Abnormalities: No  Fine motor coordination changes: No   Injections: No    Pertinent Surgical History: N/A   Imagin) MRI Cervical Spine (3/30/2024) - OA   Physician Disclaimer: I have personally reviewed and confirmed all HPI data with the patient.  [] : Patient is currently injured and not playing sports: no [FreeTextEntry7] : left arm [de-identified] : cervical mri at [facility]

## 2024-04-08 NOTE — PHYSICAL EXAM
[de-identified] : Constitutional:   - Mild distress   - Well developed; well nourished    Neurological:   - normal mood and affect   - alert and oriented x 3     Cardiovascular:   - grossly normal   Cervical Spine Exam:   Inspection:   erythema (-)   ecchymosis (-)   rashes (-) Exaggerated thoracic kyphosis  Palpation:                                                    Cervical paraspinal tenderness:         R (+); L(+)  Upper trapezius tenderness:              R (+); L (+)  Rhomboids tenderness:                      R (-); L (-)  Occipital Ridge:                                    R (-); L (-)  Supraspinatus tenderness:                 R (-); L (-)   ROM:   Severely restricted ROM all planes pain with flexion extension and bilateral rotation  Strength Testing:              Deltoid                           R (4/5); L (4/5)  Biceps:                          R (5/5); L (4/5)  Triceps:                         R (5/5); L (5/5)  Finger Abductors:         R (5/5); L (5/5)  Grasp:                           R (5/5); L (5/5)   Special Testing:  Spurling Test:                  R (=); L (+)  Facet load test:               R (-); L (-)   Neuro:  SILT throughout right upper extremity  SILT throughout left upper extremity   Reflexes:  Biceps   -           R (2+); L (2+)  Triceps  -           R (2+); L (2+)  Brachioradialis- R (2+); L (2+)     No ankle clonus  Ambulates with walker

## 2024-04-08 NOTE — ASSESSMENT
[FreeTextEntry1] : A discussion regarding available pain management treatment options occurred with the patient.  These included interventional, rehabilitative, pharmacological, and alternative modalities. We will proceed with the following:    Interventional treatment options:   - Discussed option of left PM C7-T1 DENNISE with fluoroscopic guidance; patient would require clearance to hold clopidogrel x 7 days - Patient wishes to exhaust further conservative care before consideration for interventional treatment options - Consider cervical TPI for refractory myofascial pain component  - see additional instructions below    Rehabilitative options:   - initiate trial of physical therapy - participation in active HEP was discussed and encouraged as tolerated  Medication based treatment options:   - initiate trial of MDP - Poor candidate for oral NSAIDs secondary to chronic anticoagulation - Encouraged use of PPI with use of oral corticosteroid - Add methocarbamol 500 mg up to BID as needed for spasm - see additional instructions below    Complementary treatment options:   - lifestyle modifications discussed    Additional treatment recommendations as follows: - Patient counseled as to red flag signs and when to seek urgent care - patient will follow-up in 6 weeks to assess response to conservative care  We have discussed the risks, benefits, and alternatives for NSAID therapy including but not limited to the risk of bleeding, thrombosis, gastric mucosal irritation/ulceration, allergic reaction and kidney dysfunction.  The patient verbalizes an understanding.  The documentation recorded by the scribe, in my presence, accurately reflects the service I personally performed and the decisions made by me with my edits as appropriate.   I, Dakotah Rios acting as scribe, attest that this documentation has been prepared under the direction and in the presence of Provider Eagle Kang DO.

## 2024-04-19 ENCOUNTER — RX RENEWAL (OUTPATIENT)
Age: 74
End: 2024-04-19

## 2024-04-19 RX ORDER — AMLODIPINE BESYLATE 5 MG/1
5 TABLET ORAL
Qty: 90 | Refills: 3 | Status: ACTIVE | COMMUNITY
Start: 2016-11-30 | End: 1900-01-01

## 2024-04-23 ENCOUNTER — RX RENEWAL (OUTPATIENT)
Age: 74
End: 2024-04-23

## 2024-04-23 RX ORDER — ONDANSETRON 4 MG/1
4 TABLET ORAL EVERY 6 HOURS
Qty: 30 | Refills: 2 | Status: ACTIVE | COMMUNITY
Start: 2022-11-28 | End: 1900-01-01

## 2024-04-24 ENCOUNTER — NON-APPOINTMENT (OUTPATIENT)
Age: 74
End: 2024-04-24

## 2024-05-01 ENCOUNTER — LABORATORY RESULT (OUTPATIENT)
Age: 74
End: 2024-05-01

## 2024-05-01 ENCOUNTER — APPOINTMENT (OUTPATIENT)
Dept: FAMILY MEDICINE | Facility: CLINIC | Age: 74
End: 2024-05-01
Payer: MEDICARE

## 2024-05-01 VITALS
DIASTOLIC BLOOD PRESSURE: 78 MMHG | HEIGHT: 63 IN | BODY MASS INDEX: 23.04 KG/M2 | TEMPERATURE: 97.3 F | WEIGHT: 130 LBS | SYSTOLIC BLOOD PRESSURE: 122 MMHG | OXYGEN SATURATION: 97 % | HEART RATE: 82 BPM

## 2024-05-01 DIAGNOSIS — R35.1 NOCTURIA: ICD-10-CM

## 2024-05-01 DIAGNOSIS — B35.1 TINEA UNGUIUM: ICD-10-CM

## 2024-05-01 PROCEDURE — 99213 OFFICE O/P EST LOW 20 MIN: CPT

## 2024-05-01 NOTE — HISTORY OF PRESENT ILLNESS
[FreeTextEntry8] : 74yo female with PMH of T1DM (last A1c 8.6% on 4/2/24), PAD, MS, CAD who presents to the office for concern of darkening nails.   Patient reports that she went to a new nail salon to get her fingernails done, then noted discoloration of her right thumb and index finger. No pain in the area. Reports thumb is worse, dark brown. She is concerned given her diabetes history. She has tried a topical antifungal but hasn't noticed much improvement.   She is complaining of one episode of nocturia per night, which is new for her. No dysuria or urgency.   Patient follows regularly with vascular surgery, underwent angiogram on 4/5/24 of the left lower extremity due to left ankle ulceration. Surgeon unable to pass through and now must have angiogram in the hospital.  [Spouse] : spouse

## 2024-05-01 NOTE — REVIEW OF SYSTEMS
[Fever] : no fever [Chills] : no chills [Dysuria] : no dysuria [Nocturia] : nocturia [Hematuria] : no hematuria [Frequency] : frequency [Nail Changes] : nail changes

## 2024-05-01 NOTE — PHYSICAL EXAM
[No Acute Distress] : no acute distress [Well-Appearing] : well-appearing [de-identified] : brown/black pigmentation of right thumb and index finger nails

## 2024-05-01 NOTE — ASSESSMENT
[FreeTextEntry1] : #Onychomysosis - Presenting to the office with darkening of nails on right thumb and index finger following manicure - Suspect fungal infection, at increased risk given diabetes history - Topicals not helping - Start terbinafine 250mg daily for 6 weeks - Check CMP after treatment to monitor for elevated LFTs/elevated CR  #Nocturia - Complaining of one episode of nocturia per night - No associated dysuria, burning - Will check UA to exclude UTI

## 2024-05-03 ENCOUNTER — RX RENEWAL (OUTPATIENT)
Age: 74
End: 2024-05-03

## 2024-05-03 LAB
APPEARANCE: CLEAR
BILIRUBIN URINE: NEGATIVE
BLOOD URINE: NEGATIVE
COLOR: YELLOW
GLUCOSE QUALITATIVE U: 100 MG/DL
KETONES URINE: NEGATIVE MG/DL
LEUKOCYTE ESTERASE URINE: ABNORMAL
NITRITE URINE: NEGATIVE
PH URINE: 7
PROTEIN URINE: NEGATIVE MG/DL
SPECIFIC GRAVITY URINE: 1.01
UROBILINOGEN URINE: 0.2 MG/DL

## 2024-05-03 RX ORDER — AMITRIPTYLINE HYDROCHLORIDE 10 MG/1
10 TABLET, FILM COATED ORAL
Qty: 90 | Refills: 1 | Status: ACTIVE | COMMUNITY
Start: 2016-11-01 | End: 1900-01-01

## 2024-05-04 ENCOUNTER — RX RENEWAL (OUTPATIENT)
Age: 74
End: 2024-05-04

## 2024-05-04 RX ORDER — TRIAMTERENE AND HYDROCHLOROTHIAZIDE 37.5; 25 MG/1; MG/1
37.5-25 CAPSULE ORAL
Qty: 90 | Refills: 0 | Status: ACTIVE | COMMUNITY
Start: 2022-04-13 | End: 1900-01-01

## 2024-05-08 ENCOUNTER — RX RENEWAL (OUTPATIENT)
Age: 74
End: 2024-05-08

## 2024-05-08 ENCOUNTER — APPOINTMENT (OUTPATIENT)
Dept: FAMILY MEDICINE | Facility: CLINIC | Age: 74
End: 2024-05-08
Payer: MEDICARE

## 2024-05-08 VITALS — HEART RATE: 72 BPM | SYSTOLIC BLOOD PRESSURE: 140 MMHG | RESPIRATION RATE: 16 BRPM | DIASTOLIC BLOOD PRESSURE: 70 MMHG

## 2024-05-08 VITALS
WEIGHT: 130 LBS | SYSTOLIC BLOOD PRESSURE: 138 MMHG | HEIGHT: 63 IN | HEART RATE: 79 BPM | DIASTOLIC BLOOD PRESSURE: 60 MMHG | BODY MASS INDEX: 23.04 KG/M2 | OXYGEN SATURATION: 98 % | TEMPERATURE: 97.8 F

## 2024-05-08 DIAGNOSIS — I25.10 ATHEROSCLEROTIC HEART DISEASE OF NATIVE CORONARY ARTERY W/OUT ANGINA PECTORIS: ICD-10-CM

## 2024-05-08 DIAGNOSIS — E78.5 HYPERLIPIDEMIA, UNSPECIFIED: ICD-10-CM

## 2024-05-08 DIAGNOSIS — I10 ESSENTIAL (PRIMARY) HYPERTENSION: ICD-10-CM

## 2024-05-08 DIAGNOSIS — E11.9 TYPE 2 DIABETES MELLITUS W/OUT COMPLICATIONS: ICD-10-CM

## 2024-05-08 PROCEDURE — G2211 COMPLEX E/M VISIT ADD ON: CPT

## 2024-05-08 PROCEDURE — 99214 OFFICE O/P EST MOD 30 MIN: CPT

## 2024-05-08 RX ORDER — TERBINAFINE HYDROCHLORIDE 250 MG/1
250 TABLET ORAL DAILY
Qty: 42 | Refills: 0 | Status: ACTIVE | COMMUNITY
Start: 2024-05-01 | End: 1900-01-01

## 2024-05-08 RX ORDER — IBUPROFEN 600 MG/1
600 TABLET, FILM COATED ORAL
Qty: 60 | Refills: 3 | Status: COMPLETED | COMMUNITY
Start: 2023-03-27 | End: 2024-05-08

## 2024-05-08 RX ORDER — METHOCARBAMOL 500 MG/1
500 TABLET, FILM COATED ORAL TWICE DAILY
Qty: 60 | Refills: 1 | Status: COMPLETED | COMMUNITY
Start: 2024-04-08 | End: 2024-05-08

## 2024-05-08 NOTE — ASSESSMENT
[FreeTextEntry1] : ISCHEMIC ULCER SEEING VASCULAR  BP ACCEPTABLE CONTINUE AMLODIPINE HLD CONTINUE ATORVASTATIN dm  CONTINUE TRESEBA

## 2024-05-08 NOTE — HISTORY OF PRESENT ILLNESS
[FreeTextEntry1] : pt here for management of pvd HTN HLD dm COPD DM   [de-identified] : STILL HAS TROUBLE FINDING WORDS WORSE AT NIGHT  HAD ANGIOPLASTY OF LEFT LEG NEEDS TO SEE VASCULAR SURGERY GLUCOSE HIGH FORGETS TO GIVE HERSELF INSULIN  SAW NEURO GETTING COGNITIVE TEST

## 2024-05-13 ENCOUNTER — APPOINTMENT (OUTPATIENT)
Dept: PAIN MANAGEMENT | Facility: CLINIC | Age: 74
End: 2024-05-13
Payer: MEDICARE

## 2024-05-13 ENCOUNTER — OFFICE (OUTPATIENT)
Dept: URBAN - METROPOLITAN AREA CLINIC 1 | Facility: CLINIC | Age: 74
Setting detail: OPHTHALMOLOGY
End: 2024-05-13
Payer: MEDICARE

## 2024-05-13 VITALS — HEIGHT: 63 IN | WEIGHT: 134 LBS | BODY MASS INDEX: 23.74 KG/M2

## 2024-05-13 DIAGNOSIS — H01.001: ICD-10-CM

## 2024-05-13 DIAGNOSIS — H01.002: ICD-10-CM

## 2024-05-13 DIAGNOSIS — H16.231: ICD-10-CM

## 2024-05-13 DIAGNOSIS — H01.005: ICD-10-CM

## 2024-05-13 DIAGNOSIS — M50.30 OTHER CERVICAL DISC DEGENERATION, UNSPECIFIED CERVICAL REGION: ICD-10-CM

## 2024-05-13 DIAGNOSIS — M79.18 MYALGIA, OTHER SITE: ICD-10-CM

## 2024-05-13 DIAGNOSIS — H25.13: ICD-10-CM

## 2024-05-13 DIAGNOSIS — G50.0: ICD-10-CM

## 2024-05-13 DIAGNOSIS — E11.9: ICD-10-CM

## 2024-05-13 DIAGNOSIS — M47.812 SPONDYLOSIS W/OUT MYELOPATHY OR RADICULOPATHY, CERVICAL REGION: ICD-10-CM

## 2024-05-13 DIAGNOSIS — M25.519 PAIN IN UNSPECIFIED SHOULDER: ICD-10-CM

## 2024-05-13 DIAGNOSIS — H16.223: ICD-10-CM

## 2024-05-13 DIAGNOSIS — M25.512 PAIN IN LEFT SHOULDER: ICD-10-CM

## 2024-05-13 DIAGNOSIS — H01.004: ICD-10-CM

## 2024-05-13 DIAGNOSIS — H35.3131: ICD-10-CM

## 2024-05-13 PROCEDURE — 92250 FUNDUS PHOTOGRAPHY W/I&R: CPT | Performed by: OPHTHALMOLOGY

## 2024-05-13 PROCEDURE — 92014 COMPRE OPH EXAM EST PT 1/>: CPT | Performed by: OPHTHALMOLOGY

## 2024-05-13 PROCEDURE — 99213 OFFICE O/P EST LOW 20 MIN: CPT

## 2024-05-13 ASSESSMENT — LID EXAM ASSESSMENTS
OD_BLEPHARITIS: RLL RUL
OS_BLEPHARITIS: LLL LUL

## 2024-05-13 ASSESSMENT — CONFRONTATIONAL VISUAL FIELD TEST (CVF)
OD_FINDINGS: FULL
OS_FINDINGS: FULL

## 2024-05-13 NOTE — HISTORY OF PRESENT ILLNESS
[Neck] : neck [Sudden] : sudden [10] : 10 [Dull/Aching] : dull/aching [Intermittent] : intermittent [Household chores] : household chores [Leisure] : leisure [Sleep] : sleep [Social interactions] : social interactions [FreeTextEntry1] : 2024 - Patient presents for FUV regarding their neck pain. Patient reports a slight improvement of pain following the MDP, she continues to participate in formal PT with no perceived benefit although her  states she has benefited greatly.  Patient reports focus of pain remains in her left shoulder; anteriorly to be specific.  Pain worst with overhead activity.  Patient uses meloxicam PRN for pain management.   2024 - The patient presents for initial evaluation regarding their neck pain.   She is accompanied by her  who assists with providing history.  Patient was referred by Urgent Care; NIRANJAN Mata.  Patient has a long-standing history of neck pain but is having a recent severe worsening over the past 2 weeks.  She denies any particular inciting or exacerbating events although she reports recent travel home from [place].  Her current pain is mostly across the neck (R>L) with radiation to the left shoulder and bicep, pain does not travel past the elbow.  She denies any paresthesias.  She is on chronic anticoagulation for vascular disease; she underwent recent attempt at revascularization of the left lower extremity about 1 week ago.  She is currently on Plavix.  Injections: No    Pertinent Surgical History: N/A   Imagin) MRI Cervical Spine (3/30/2024) - [facility]   Physician Disclaimer: I have personally reviewed and confirmed all HPI data with the patient.  [] : Patient is currently injured and not playing sports: no [FreeTextEntry7] : left arm [de-identified] : [facility]

## 2024-05-13 NOTE — ASSESSMENT
[FreeTextEntry1] : A discussion regarding available pain management treatment options occurred with the patient.  These included interventional, rehabilitative, pharmacological, and alternative modalities. We will proceed with the following:    Interventional treatment options:   - Once again discussed option of left PM C7-T1 DENNISE with for diagnostic/therapeutic benefit; she defers - patient would require clearance to hold clopidogrel x 7 days - Patient will discuss interventional therapies for the left shoulder with orthopedic surgery - Consider cervical TPI for refractory myofascial pain component  - see additional instructions below    Rehabilitative options:   - Continue physical therapy - participation in active HEP was discussed and encouraged as tolerated  Medication based treatment options: - Poor candidate for oral NSAIDs secondary to chronic anticoagulation - Continue methocarbamol 500 mg up to BID as needed for spasm - see additional instructions below    Complementary treatment options:   - lifestyle modifications discussed    Additional treatment recommendations as follows: - Advised orthopedic evaluation of left shoulder; referral provided - patient will follow-up in 6 weeks or as needed basis  The documentation recorded by the scribe, in my presence, accurately reflects the service I personally performed and the decisions made by me with my edits as appropriate.   I, Dakotah Rios acting as scribe, attest that this documentation has been prepared under the direction and in the presence of Provider Eagle Kang DO.

## 2024-05-13 NOTE — PHYSICAL EXAM
[de-identified] : Constitutional:   - No acute distress   - Well developed; well nourished    Neurological:   - normal mood and affect   - alert and oriented x 3     Cardiovascular:   - grossly normal   Cervical Spine Exam:   Inspection:   erythema (-)   ecchymosis (-)   rashes (-) Exaggerated thoracic kyphosis  Palpation:                                                    Cervical paraspinal tenderness:         R (-); L (-)  Upper trapezius tenderness:              R (+); L (+)  Rhomboids tenderness:                      R (-); L (-)  Occipital Ridge:                                    R (-); L (-)  Supraspinatus tenderness:                 R (-); L (-)   ROM:   restricted ROM all planes -significantly improved from previous visit pain with flexion extension and bilateral rotation  Strength Testing:              Deltoid                           R (5/5); L (4/5)  Biceps:                          R (5/5); L (4/5)  Triceps:                         R (5/5); L (5/5)  Finger Abductors:         R (5/5); L (5/5)  Grasp:                           R (5/5); L (5/5)   Special Testing:  Spurling Test:                  R (-); L (=)  Facet load test:               R (-); L (-)   Neuro:  SILT throughout right upper extremity  SILT throughout left upper extremity   Reflexes:  Biceps   -           R (2+); L (2+)  Triceps  -           R (2+); L (2+)  Brachioradialis- R (2+); L (2+)     No ankle clonus  Ambulates with walker [Left] : left shoulder [Sitting] : sitting [Moderate] : moderate [] : positive impingement testing

## 2024-05-29 ENCOUNTER — APPOINTMENT (OUTPATIENT)
Dept: VASCULAR SURGERY | Facility: CLINIC | Age: 74
End: 2024-05-29
Payer: MEDICARE

## 2024-05-29 VITALS
RESPIRATION RATE: 16 BRPM | DIASTOLIC BLOOD PRESSURE: 66 MMHG | HEIGHT: 63 IN | OXYGEN SATURATION: 98 % | BODY MASS INDEX: 23.74 KG/M2 | SYSTOLIC BLOOD PRESSURE: 140 MMHG | WEIGHT: 134 LBS | HEART RATE: 73 BPM

## 2024-05-29 DIAGNOSIS — I73.9 PERIPHERAL VASCULAR DISEASE, UNSPECIFIED: ICD-10-CM

## 2024-05-29 PROCEDURE — 99203 OFFICE O/P NEW LOW 30 MIN: CPT

## 2024-05-29 NOTE — ASSESSMENT
[FreeTextEntry1] : 72 yo female with PAD and ischemic foot wound of left ankle. Dr. Middleton unable to pass occlusion during angiogram.   Pt counseled on above diagnosis. Pt counseled to continue all medications Plan for LLE angiogram with possible intervention. The risks and benefits of the surgical procedure were discussed with patient, all questions were answered.   A total of 30 minutes was spent with patient and coordinating care
close supervision

## 2024-05-29 NOTE — HISTORY OF PRESENT ILLNESS
[FreeTextEntry1] : 74 yo female PMHx CAD, DDD, DM II, HLD, HTN, hx of TIA, presents with LLE rest pain. Pt had two LLE angiograms with Dr. Middleton and he was unable to pass occlusion. She has a left lateral ankle wound for the past year that is non-healing and tender. She has noticed that her left foot is red when she is sitting.

## 2024-05-29 NOTE — PHYSICAL EXAM
[0] : left 0 [de-identified] : NAD. well appearing  [FreeTextEntry1] : left lateral ankle with small scab about 0.5 cm x 0.5 cm  dependent rubor of left foot

## 2024-06-03 ENCOUNTER — APPOINTMENT (OUTPATIENT)
Dept: ORTHOPEDIC SURGERY | Facility: CLINIC | Age: 74
End: 2024-06-03
Payer: MEDICARE

## 2024-06-03 DIAGNOSIS — M12.812 OTHER SPECIFIC ARTHROPATHIES, NOT ELSEWHERE CLASSIFIED, LEFT SHOULDER: ICD-10-CM

## 2024-06-03 DIAGNOSIS — M19.012 PRIMARY OSTEOARTHRITIS, LEFT SHOULDER: ICD-10-CM

## 2024-06-03 PROCEDURE — J3490M: CUSTOM | Mod: NC

## 2024-06-03 PROCEDURE — 20610 DRAIN/INJ JOINT/BURSA W/O US: CPT | Mod: LT

## 2024-06-03 PROCEDURE — 99204 OFFICE O/P NEW MOD 45 MIN: CPT | Mod: 25

## 2024-06-03 NOTE — HISTORY OF PRESENT ILLNESS
[de-identified] : The patient is a 73 year old RHD F who presents today complaining of LEFT shoulder pain. Date of Injury/Onset:  March 2024 Pain:    At Rest:  5/10 With Activity:    7/10 Mechanism of injury: No specific cause of injury/ trauma  Quality of symptoms: Constant aching and shooting pain down to elbow  Improves with: Nothing helps Worse with: Intermittent  Prior treatment/ Imaging: NIRANJAN Jacobson / Dr. aKng / PT  School/Sport/Position/Occupation:  Retired Additional Information: MS (not taking anything), using walker, Type 1 DM A1c 7.5

## 2024-06-03 NOTE — PROCEDURE
[FreeTextEntry3] : Patient Identification  Name/: Verbal with patient and/or family    Procedure Verification:  Procedure confirmed with patient or family/designee  Consent for procedure: Verbal Consent Given  Relevant documentation completed, reviewed, and signed  Clinical indications for procedure confirmed    Time-out with all members of procedure team immediately prior to procedure:  Correct patient identified. Agreement on procedure. Correct side and site.    SHOULDER SUBACROMIAL INJECTION - LEFT  After verbal consent and identification of the correct patient and correct site, the posterior right shoulder was prepped using alcohol swabs and betadine. This was allowed time to air dry. After ethyl chloride spray for skin anesthesia, a mixture of 1cc Celestone 6mg/ml, 3cc Lidocaine 1%, and 3cc Bupivacaine 0.5% was injected under ultrasound guidance into the subacromial space from posterior using a sterile 22G needle. Visualization of the needle and placement of the injection was performed without any complications. Ultrasound was used for visualization, precise injection in area of tear, and / or prior failure or difficult injection. The patient tolerated the procedure well. After-care instructions were provided and included instructions to ice the area and to call if redness, pain, or fever develop.

## 2024-06-03 NOTE — IMAGING
[Left] : left shoulder [Outside films reviewed] : Outside films reviewed [There are no fractures, subluxations or dislocations. No significant abnormalities are seen] : There are no fractures, subluxations or dislocations. No significant abnormalities are seen [de-identified] : The patient is a well appearing 73 year year old female of their stated age. Neck is supple & nontender to palpation. Negative Spurling's test.  Effected Shoulder LEFT Inspection: Scapula Winging: Negative Deformity: None Erythema: None Ecchymosis: None Abrasions: None Effusion: Mild Crepitus: Moderate  Range of Motion: Active Forward Flexion:110 degrees  Passive Forward Flexion:130 degrees  Active IR : back pocket Active ER :30 degrees  Motor Exam: Forward Flexion: 4+ out of 5 Flexion Plane of Scapula: 5 out of 5 Abduction: 4+ out of 5 Internal Rotation: 5 out of 5 External Rotation: 4+ out of 5 Distal Motor Strength: 5 out of 5  Stability Testing: Anterior: 1+ Posterior: 1+ Sulcus N: 1+ Sulcus ER: 1+  Provocative Tests: Drop Arm: Negative Mcneil/Impingement: Positive [place]: Positive X-Arm Adduction: Negative Belly Press: Negative Bear Hug: Negative Lift Off: Negative Apprehension: Negative Relocation: Negative Posterior Load & Shift: Negative  Palpation: AC Joint: Nontender Clavicle: Nontender SC Joint: Nontender Bicepital Groove: Positive Coracoid Process: Positive Pectoralis Minor Tendon: Nontender Pectoralis Major Tendon: Nontender & palpably intact Latissimus Dorsi: Nontender  Proximal Humerus: Positive Scapula Body: Nontender Medial Scapula Boarder: Nontender Scapula Spine: Nontender  Neurologic Exam: Sensation to Light Touch: Axillary: Grossly intact Ulnar: Grossly intact Radial: Grossly intact Median: Grossly intact Other:  N/A  Circulatory/Pulses: Ulnar: 2+ Radial: 2+ Other Pertinent Findings: None

## 2024-06-03 NOTE — DISCUSSION/SUMMARY
[de-identified] : Assessment:   The patient presents with history, examination and imaging that are most consistent with a diagnosis of:  Rotator cuff arthropathy of left shoulder   The patient would like to pursue conservative measures at this time. After consideration of various non-operative treatment modalities, the patient would like to proceed with the modalities listed below   During this appointment the patient was examined, diagnoses were discussed and explained in a face to face manner. In addition extensive time was spent reviewing aforementioned diagnostic studies. Counseling including abnormal image results, differential diagnoses, treatment options, risk and benefits, lifestyle changes, current condition, and current medications was performed. Patient's comments, questions, and concerns were address and patient verbalized understanding.   ---------------------------     Plan: - CSI of left shoulder today - uvaldo well - Ice     Follow-up:  3 months

## 2024-06-04 ENCOUNTER — RX RENEWAL (OUTPATIENT)
Age: 74
End: 2024-06-04

## 2024-06-04 RX ORDER — MELOXICAM 15 MG/1
15 TABLET ORAL DAILY
Qty: 30 | Refills: 0 | Status: ACTIVE | COMMUNITY
Start: 2024-05-08 | End: 1900-01-01

## 2024-06-05 ENCOUNTER — APPOINTMENT (OUTPATIENT)
Dept: FAMILY MEDICINE | Facility: CLINIC | Age: 74
End: 2024-06-05
Payer: MEDICARE

## 2024-06-05 VITALS
HEIGHT: 63.5 IN | TEMPERATURE: 97.2 F | WEIGHT: 131.2 LBS | DIASTOLIC BLOOD PRESSURE: 70 MMHG | SYSTOLIC BLOOD PRESSURE: 140 MMHG | BODY MASS INDEX: 22.96 KG/M2 | OXYGEN SATURATION: 98 % | HEART RATE: 74 BPM

## 2024-06-05 DIAGNOSIS — K12.0 RECURRENT ORAL APHTHAE: ICD-10-CM

## 2024-06-05 DIAGNOSIS — G43.909 MIGRAINE, UNSPECIFIED, NOT INTRACTABLE, W/OUT STATUS MIGRAINOSUS: ICD-10-CM

## 2024-06-05 PROCEDURE — 99214 OFFICE O/P EST MOD 30 MIN: CPT

## 2024-06-05 RX ORDER — BUTALBITAL, ASPIRIN, AND CAFFEINE 325; 50; 40 MG/1; MG/1; MG/1
50-325-40 CAPSULE ORAL
Qty: 30 | Refills: 0 | Status: ACTIVE | COMMUNITY
Start: 2016-12-22 | End: 1900-01-01

## 2024-06-05 RX ORDER — LIDOCAINE HYDROCHLORIDE 20 MG/ML
2 SOLUTION ORAL; TOPICAL 3 TIMES DAILY
Qty: 1 | Refills: 0 | Status: ACTIVE | COMMUNITY
Start: 2024-06-05 | End: 1900-01-01

## 2024-06-05 NOTE — ASSESSMENT
[FreeTextEntry1] : #Aphthous ulcer - Presenting with lip lesion/palate lesion, worsening over the past week - Minimal improvement with OTC remedies - Start viscous lidocaine - Orajel to lip lesion for comfort  #Lower extremity ulcer - Referral to vascular surgeon Dr. Arenas for second opinion  #Migraines  - Refill fioricet

## 2024-06-05 NOTE — PHYSICAL EXAM
[No Acute Distress] : no acute distress [Well-Appearing] : well-appearing [Normal Affect] : the affect was normal [Normal Insight/Judgement] : insight and judgment were intact [de-identified] : lip ulceration of upper lip with aphthous ulcer of palate

## 2024-06-05 NOTE — HISTORY OF PRESENT ILLNESS
[FreeTextEntry8] : 74yo female who presents to the office complaining of mouth sore.  Reports sore began on her upper lip less than a week ago. Has been using Vaseline without improvement. Now with sore extending to upper palate. Reports eating is uncomfortable. Using cepacol rinse but this bothers the area.   Needs refill of Fiorcet.  Would like referral to a Vascular Surgeon for second opinion. Patient with history of ischemic ulcer of lower extremity. She has undergone outpatient angiogram which was unable to completely pass and evaluate. She was told she will need to have another angiogram in a hospital. Reports that the ulcer is improving and would like to avoid another angiogram if possible. Would like to obtain a second opinion.

## 2024-06-17 ENCOUNTER — APPOINTMENT (OUTPATIENT)
Dept: VASCULAR SURGERY | Facility: CLINIC | Age: 74
End: 2024-06-17

## 2024-06-17 ENCOUNTER — APPOINTMENT (OUTPATIENT)
Dept: VASCULAR SURGERY | Facility: CLINIC | Age: 74
End: 2024-06-17
Payer: MEDICARE

## 2024-06-17 VITALS
OXYGEN SATURATION: 99 % | HEIGHT: 63 IN | WEIGHT: 130 LBS | BODY MASS INDEX: 23.04 KG/M2 | HEART RATE: 72 BPM | SYSTOLIC BLOOD PRESSURE: 152 MMHG | DIASTOLIC BLOOD PRESSURE: 66 MMHG

## 2024-06-17 DIAGNOSIS — L97.309 NON-PRESSURE CHRONIC ULCER OF UNSPECIFIED ANKLE WITH UNSPECIFIED SEVERITY: ICD-10-CM

## 2024-06-17 PROCEDURE — 93926 LOWER EXTREMITY STUDY: CPT

## 2024-06-17 PROCEDURE — 99214 OFFICE O/P EST MOD 30 MIN: CPT

## 2024-06-19 NOTE — PHYSICAL EXAM
[1+] : right 1+ [0] : left 0 [de-identified] : NAD.  Alert and oriented.  [de-identified] : Symmetrical non-labored respirations.  [de-identified] : Regular rate.  [FreeTextEntry1] : left 0.5x0.5mm punctate ulceration over lateral malleolus

## 2024-06-19 NOTE — PLAN
[TextEntry] : 72 yo female with hx of PAD and ischemic foot wound of left ankle. Occluded distal SFA-pop stent and failed attempt on 4/5/24 to pass occlusion during angiogram with Dr. Middleton.   - Mepilex applied to wound, patient has silvadene at home and will apply daily.  Rx sent for Mepilex to also apply daily.  - MRI left ankle ordered r/o osteomyelitis - Will have patient rtc in 3 weeks, she will bring her off loading boot to this foot so we can evaluate fit

## 2024-06-19 NOTE — RESULTS/DATA
[TextEntry] : distal EIA, CFA, DFA, patent with stenosis.  >75% at the proximal SFA.  Diminished waveforms mid SFA.  Distal SFA and proximal popliteal artery heavily calcified.  Diminished flow in distal pop, TPT, peroneal and PTA.  REMBERTO is patent with short segment heavy calcification.

## 2024-07-03 ENCOUNTER — RX RENEWAL (OUTPATIENT)
Age: 74
End: 2024-07-03

## 2024-07-08 ENCOUNTER — APPOINTMENT (OUTPATIENT)
Dept: VASCULAR SURGERY | Facility: CLINIC | Age: 74
End: 2024-07-08

## 2024-07-08 VITALS
OXYGEN SATURATION: 98 % | HEIGHT: 63 IN | DIASTOLIC BLOOD PRESSURE: 73 MMHG | WEIGHT: 130 LBS | SYSTOLIC BLOOD PRESSURE: 148 MMHG | BODY MASS INDEX: 23.04 KG/M2 | HEART RATE: 65 BPM

## 2024-07-08 DIAGNOSIS — S81.802A UNSPECIFIED OPEN WOUND, LEFT LOWER LEG, INITIAL ENCOUNTER: ICD-10-CM

## 2024-07-08 PROCEDURE — 99212 OFFICE O/P EST SF 10 MIN: CPT

## 2024-07-18 ENCOUNTER — RX RENEWAL (OUTPATIENT)
Age: 74
End: 2024-07-18

## 2024-07-19 ENCOUNTER — OFFICE (OUTPATIENT)
Dept: URBAN - METROPOLITAN AREA CLINIC 1 | Facility: CLINIC | Age: 74
Setting detail: OPHTHALMOLOGY
End: 2024-07-19
Payer: MEDICARE

## 2024-07-19 DIAGNOSIS — H35.3131: ICD-10-CM

## 2024-07-19 DIAGNOSIS — H43.811: ICD-10-CM

## 2024-07-19 PROCEDURE — 99213 OFFICE O/P EST LOW 20 MIN: CPT

## 2024-07-19 PROCEDURE — 92134 CPTRZ OPH DX IMG PST SGM RTA: CPT

## 2024-07-19 ASSESSMENT — CONFRONTATIONAL VISUAL FIELD TEST (CVF)
OS_FINDINGS: FULL
OD_FINDINGS: FULL

## 2024-07-19 ASSESSMENT — LID EXAM ASSESSMENTS
OD_BLEPHARITIS: RLL RUL
OS_BLEPHARITIS: LLL LUL

## 2024-07-31 ENCOUNTER — APPOINTMENT (OUTPATIENT)
Dept: FAMILY MEDICINE | Facility: CLINIC | Age: 74
End: 2024-07-31
Payer: MEDICARE

## 2024-07-31 VITALS
WEIGHT: 130 LBS | TEMPERATURE: 98 F | OXYGEN SATURATION: 96 % | SYSTOLIC BLOOD PRESSURE: 160 MMHG | DIASTOLIC BLOOD PRESSURE: 70 MMHG | HEART RATE: 84 BPM | HEIGHT: 63.5 IN | BODY MASS INDEX: 22.75 KG/M2

## 2024-07-31 VITALS — RESPIRATION RATE: 16 BRPM | SYSTOLIC BLOOD PRESSURE: 140 MMHG | HEART RATE: 78 BPM | DIASTOLIC BLOOD PRESSURE: 70 MMHG

## 2024-07-31 DIAGNOSIS — E11.9 TYPE 2 DIABETES MELLITUS W/OUT COMPLICATIONS: ICD-10-CM

## 2024-07-31 DIAGNOSIS — I10 ESSENTIAL (PRIMARY) HYPERTENSION: ICD-10-CM

## 2024-07-31 DIAGNOSIS — I25.10 ATHEROSCLEROTIC HEART DISEASE OF NATIVE CORONARY ARTERY W/OUT ANGINA PECTORIS: ICD-10-CM

## 2024-07-31 DIAGNOSIS — G35 MULTIPLE SCLEROSIS: ICD-10-CM

## 2024-07-31 DIAGNOSIS — E78.5 HYPERLIPIDEMIA, UNSPECIFIED: ICD-10-CM

## 2024-07-31 DIAGNOSIS — I73.9 PERIPHERAL VASCULAR DISEASE, UNSPECIFIED: ICD-10-CM

## 2024-07-31 PROCEDURE — 99214 OFFICE O/P EST MOD 30 MIN: CPT

## 2024-07-31 PROCEDURE — G2211 COMPLEX E/M VISIT ADD ON: CPT

## 2024-07-31 NOTE — ASSESSMENT
[FreeTextEntry1] : PAD  LEG  ULCER HEALED WILL MONITOR DM CONTINUE TRESIBA BP ACCEPTABLE CONTINUE RAMIPRIL DM SEES ENDO

## 2024-07-31 NOTE — HISTORY OF PRESENT ILLNESS
[FreeTextEntry1] : pt here for management of HTN HLD DM MS  [de-identified] : SAW NEURO MEMORY SHALOM FROM MS ALSO SAW VASCULAR FOR LEG ULCER  GLUCOSE 4-5 X A DAY WITH G 7 NO RECENT FALLS

## 2024-07-31 NOTE — PHYSICAL EXAM
[No Acute Distress] : no acute distress [PERRL] : pupils equal round and reactive to light [Normal TMs] : both tympanic membranes were normal [Supple] : supple [Clear to Auscultation] : lungs were clear to auscultation bilaterally [Regular Rhythm] : with a regular rhythm [No Edema] : there was no peripheral edema [Normal Supraclavicular Nodes] : no supraclavicular lymphadenopathy [Normal Anterior Cervical Nodes] : no anterior cervical lymphadenopathy [de-identified] : LEFT SHOULDER  PAIN ON ROM  [de-identified] : LEFT LEG LATERAL MALEOLUS ULCER HEALED  [de-identified] : WALKER

## 2024-08-14 ENCOUNTER — RX RENEWAL (OUTPATIENT)
Age: 74
End: 2024-08-14

## 2024-08-15 ENCOUNTER — RX RENEWAL (OUTPATIENT)
Age: 74
End: 2024-08-15

## 2024-08-26 ENCOUNTER — APPOINTMENT (OUTPATIENT)
Dept: ORTHOPEDIC SURGERY | Facility: CLINIC | Age: 74
End: 2024-08-26

## 2024-08-28 ENCOUNTER — APPOINTMENT (OUTPATIENT)
Dept: ORTHOPEDIC SURGERY | Facility: CLINIC | Age: 74
End: 2024-08-28

## 2024-08-28 VITALS — HEIGHT: 63.5 IN | WEIGHT: 130 LBS | BODY MASS INDEX: 22.75 KG/M2

## 2024-08-28 DIAGNOSIS — M25.512 PAIN IN LEFT SHOULDER: ICD-10-CM

## 2024-08-28 PROCEDURE — 20610 DRAIN/INJ JOINT/BURSA W/O US: CPT | Mod: LT

## 2024-08-28 PROCEDURE — 99214 OFFICE O/P EST MOD 30 MIN: CPT | Mod: 25

## 2024-08-28 PROCEDURE — 73030 X-RAY EXAM OF SHOULDER: CPT | Mod: LT

## 2024-08-28 PROCEDURE — 73010 X-RAY EXAM OF SHOULDER BLADE: CPT | Mod: LT

## 2024-08-28 NOTE — HISTORY OF PRESENT ILLNESS
[de-identified] : 08/28/24: Follow up for the left shoulder. patient took a fall in her house 2 weeks ago, no prior treatment since last visit for the shoulder. Reports difficulty sleeping at night due to her sleeping on her left side. Reports to experiencing sharp pain, taking Tylenol and Motrin as needed for the pain. Attending PT 2x a week. Ambulating with a walker. CSI was given on 06/03/24 with significant relief given.   The patient is a 73 year old RHD F who presents today complaining of LEFT shoulder pain. Date of Injury/Onset:  March 2024 Pain:    At Rest:  5/10 With Activity:    7/10 Mechanism of injury: No specific cause of injury/ trauma  Quality of symptoms: Constant aching and shooting pain down to elbow  Improves with: Nothing helps Worse with: Intermittent  Prior treatment/ Imaging: NIRANJAN Jacobson / Dr. Kang / PT  School/Sport/Position/Occupation:  Retired Additional Information: MS (not taking anything), using walker, Type 1 DM A1c 7.5

## 2024-08-28 NOTE — DISCUSSION/SUMMARY
[de-identified] : Assessment:   The patient presents with history, examination and imaging that are most consistent with a diagnosis of:  Rotator cuff arthropathy of left shoulder   The patient would like to pursue conservative measures at this time. After consideration of various non-operative treatment modalities, the patient would like to proceed with the modalities listed below   During this appointment the patient was examined, diagnoses were discussed and explained in a face to face manner. In addition extensive time was spent reviewing aforementioned diagnostic studies. Counseling including abnormal image results, differential diagnoses, treatment options, risk and benefits, lifestyle changes, current condition, and current medications was performed. Patient's comments, questions, and concerns were address and patient verbalized understanding.   ---------------------------     Plan: - CSI of left shoulder today - uvaldo well - Ice     Follow-up:  3 months

## 2024-08-28 NOTE — HISTORY OF PRESENT ILLNESS
[de-identified] : 08/28/24: Follow up for the left shoulder. patient took a fall in her house 2 weeks ago, no prior treatment since last visit for the shoulder. Reports difficulty sleeping at night due to her sleeping on her left side. Reports to experiencing sharp pain, taking Tylenol and Motrin as needed for the pain. Attending PT 2x a week. Ambulating with a walker. CSI was given on 06/03/24 with significant relief given.   The patient is a 73 year old RHD F who presents today complaining of LEFT shoulder pain. Date of Injury/Onset:  March 2024 Pain:    At Rest:  5/10 With Activity:    7/10 Mechanism of injury: No specific cause of injury/ trauma  Quality of symptoms: Constant aching and shooting pain down to elbow  Improves with: Nothing helps Worse with: Intermittent  Prior treatment/ Imaging: NIRANJAN Jacobson / Dr. Kang / PT  School/Sport/Position/Occupation:  Retired Additional Information: MS (not taking anything), using walker, Type 1 DM A1c 7.5

## 2024-08-28 NOTE — HISTORY OF PRESENT ILLNESS
[de-identified] : 08/28/24: Follow up for the left shoulder. patient took a fall in her house 2 weeks ago, no prior treatment since last visit for the shoulder. Reports difficulty sleeping at night due to her sleeping on her left side. Reports to experiencing sharp pain, taking Tylenol and Motrin as needed for the pain. Attending PT 2x a week. Ambulating with a walker. CSI was given on 06/03/24 with significant relief given.   The patient is a 73 year old RHD F who presents today complaining of LEFT shoulder pain. Date of Injury/Onset:  March 2024 Pain:    At Rest:  5/10 With Activity:    7/10 Mechanism of injury: No specific cause of injury/ trauma  Quality of symptoms: Constant aching and shooting pain down to elbow  Improves with: Nothing helps Worse with: Intermittent  Prior treatment/ Imaging: NIRANJAN Jacobson / Dr. Kang / PT  School/Sport/Position/Occupation:  Retired Additional Information: MS (not taking anything), using walker, Type 1 DM A1c 7.5

## 2024-08-28 NOTE — IMAGING
[Left] : left shoulder [Outside films reviewed] : Outside films reviewed [There are no fractures, subluxations or dislocations. No significant abnormalities are seen] : There are no fractures, subluxations or dislocations. No significant abnormalities are seen [de-identified] : The patient is a well appearing 73 year year old female of their stated age. Neck is supple & nontender to palpation. Negative Spurling's test.  Effected Shoulder LEFT Inspection: Scapula Winging: Negative Deformity: None Erythema: None Ecchymosis: None Abrasions: None Effusion: Mild Crepitus: Moderate  Range of Motion: Active Forward Flexion:110 degrees  Passive Forward Flexion:130 degrees  Active IR : back pocket Active ER :30 degrees  Motor Exam: Forward Flexion: 4+ out of 5 Flexion Plane of Scapula: 5 out of 5 Abduction: 4+ out of 5 Internal Rotation: 5 out of 5 External Rotation: 4+ out of 5 Distal Motor Strength: 5 out of 5  Stability Testing: Anterior: 1+ Posterior: 1+ Sulcus N: 1+ Sulcus ER: 1+  Provocative Tests: Drop Arm: Negative Mcneil/Impingement: Positive [place]: Positive X-Arm Adduction: Negative Belly Press: Negative Bear Hug: Negative Lift Off: Negative Apprehension: Negative Relocation: Negative Posterior Load & Shift: Negative  Palpation: AC Joint: Nontender Clavicle: Nontender SC Joint: Nontender Bicepital Groove: Positive Coracoid Process: Positive Pectoralis Minor Tendon: Nontender Pectoralis Major Tendon: Nontender & palpably intact Latissimus Dorsi: Nontender  Proximal Humerus: Positive Scapula Body: Nontender Medial Scapula Boarder: Nontender Scapula Spine: Nontender  Neurologic Exam: Sensation to Light Touch: Axillary: Grossly intact Ulnar: Grossly intact Radial: Grossly intact Median: Grossly intact Other:  N/A  Circulatory/Pulses: Ulnar: 2+ Radial: 2+ Other Pertinent Findings: None

## 2024-08-28 NOTE — IMAGING
[Left] : left shoulder [Outside films reviewed] : Outside films reviewed [There are no fractures, subluxations or dislocations. No significant abnormalities are seen] : There are no fractures, subluxations or dislocations. No significant abnormalities are seen [de-identified] : The patient is a well appearing 73 year year old female of their stated age. Neck is supple & nontender to palpation. Negative Spurling's test.  Effected Shoulder LEFT Inspection: Scapula Winging: Negative Deformity: None Erythema: None Ecchymosis: None Abrasions: None Effusion: Mild Crepitus: Moderate  Range of Motion: Active Forward Flexion:110 degrees  Passive Forward Flexion:130 degrees  Active IR : back pocket Active ER :30 degrees  Motor Exam: Forward Flexion: 4+ out of 5 Flexion Plane of Scapula: 5 out of 5 Abduction: 4+ out of 5 Internal Rotation: 5 out of 5 External Rotation: 4+ out of 5 Distal Motor Strength: 5 out of 5  Stability Testing: Anterior: 1+ Posterior: 1+ Sulcus N: 1+ Sulcus ER: 1+  Provocative Tests: Drop Arm: Negative [place]/Impingement: Positive [place]: Positive X-Arm Adduction: Negative Belly Press: Negative Bear Hug: Negative Lift Off: Negative Apprehension: Negative Relocation: Negative Posterior Load & Shift: Negative  Palpation: AC Joint: Nontender Clavicle: Nontender SC Joint: Nontender Bicepital Groove: Positive Coracoid Process: Positive Pectoralis Minor Tendon: Nontender Pectoralis Major Tendon: Nontender & palpably intact Latissimus Dorsi: Nontender  Proximal Humerus: Positive Scapula Body: Nontender Medial Scapula Boarder: Nontender Scapula Spine: Nontender  Neurologic Exam: Sensation to Light Touch: Axillary: Grossly intact Ulnar: Grossly intact Radial: Grossly intact Median: Grossly intact Other:  N/A  Circulatory/Pulses: Ulnar: 2+ Radial: 2+ Other Pertinent Findings: None

## 2024-08-28 NOTE — DISCUSSION/SUMMARY
[de-identified] : Assessment:   The patient presents with history, examination and imaging that are most consistent with a diagnosis of:  Rotator cuff arthropathy of left shoulder   The patient would like to pursue conservative measures at this time. After consideration of various non-operative treatment modalities, the patient would like to proceed with the modalities listed below   During this appointment the patient was examined, diagnoses were discussed and explained in a face to face manner. In addition extensive time was spent reviewing aforementioned diagnostic studies. Counseling including abnormal image results, differential diagnoses, treatment options, risk and benefits, lifestyle changes, current condition, and current medications was performed. Patient's comments, questions, and concerns were address and patient verbalized understanding.   ---------------------------     Plan: - CSI of left shoulder today - uvaldo well - Ice     Follow-up:  3 months

## 2024-08-28 NOTE — IMAGING
[Left] : left shoulder [Outside films reviewed] : Outside films reviewed [There are no fractures, subluxations or dislocations. No significant abnormalities are seen] : There are no fractures, subluxations or dislocations. No significant abnormalities are seen [de-identified] : The patient is a well appearing 73 year year old female of their stated age. Neck is supple & nontender to palpation. Negative Spurling's test.  Effected Shoulder LEFT Inspection: Scapula Winging: Negative Deformity: None Erythema: None Ecchymosis: None Abrasions: None Effusion: Mild Crepitus: Moderate  Range of Motion: Active Forward Flexion:110 degrees  Passive Forward Flexion:130 degrees  Active IR : back pocket Active ER :30 degrees  Motor Exam: Forward Flexion: 4+ out of 5 Flexion Plane of Scapula: 5 out of 5 Abduction: 4+ out of 5 Internal Rotation: 5 out of 5 External Rotation: 4+ out of 5 Distal Motor Strength: 5 out of 5  Stability Testing: Anterior: 1+ Posterior: 1+ Sulcus N: 1+ Sulcus ER: 1+  Provocative Tests: Drop Arm: Negative Mcneil/Impingement: Positive Sargents: Positive X-Arm Adduction: Negative Belly Press: Negative Bear Hug: Negative Lift Off: Negative Apprehension: Negative Relocation: Negative Posterior Load & Shift: Negative  Palpation: AC Joint: Nontender Clavicle: Nontender SC Joint: Nontender Bicepital Groove: Positive Coracoid Process: Positive Pectoralis Minor Tendon: Nontender Pectoralis Major Tendon: Nontender & palpably intact Latissimus Dorsi: Nontender  Proximal Humerus: Positive Scapula Body: Nontender Medial Scapula Boarder: Nontender Scapula Spine: Nontender  Neurologic Exam: Sensation to Light Touch: Axillary: Grossly intact Ulnar: Grossly intact Radial: Grossly intact Median: Grossly intact Other:  N/A  Circulatory/Pulses: Ulnar: 2+ Radial: 2+ Other Pertinent Findings: None

## 2024-08-28 NOTE — DISCUSSION/SUMMARY
[de-identified] : Assessment:   The patient presents with history, examination and imaging that are most consistent with a diagnosis of:  Rotator cuff arthropathy of left shoulder   The patient would like to pursue conservative measures at this time. After consideration of various non-operative treatment modalities, the patient would like to proceed with the modalities listed below   During this appointment the patient was examined, diagnoses were discussed and explained in a face to face manner. In addition extensive time was spent reviewing aforementioned diagnostic studies. Counseling including abnormal image results, differential diagnoses, treatment options, risk and benefits, lifestyle changes, current condition, and current medications was performed. Patient's comments, questions, and concerns were address and patient verbalized understanding.   ---------------------------     Plan: - CSI of left shoulder today - vualdo well - Ice     Follow-up:  3 months

## 2024-09-09 ENCOUNTER — APPOINTMENT (OUTPATIENT)
Dept: VASCULAR SURGERY | Facility: CLINIC | Age: 74
End: 2024-09-09
Payer: MEDICARE

## 2024-09-09 VITALS
HEIGHT: 63 IN | HEART RATE: 75 BPM | OXYGEN SATURATION: 99 % | SYSTOLIC BLOOD PRESSURE: 145 MMHG | DIASTOLIC BLOOD PRESSURE: 70 MMHG | WEIGHT: 130 LBS | BODY MASS INDEX: 23.04 KG/M2

## 2024-09-09 DIAGNOSIS — L97.309 NON-PRESSURE CHRONIC ULCER OF UNSPECIFIED ANKLE WITH UNSPECIFIED SEVERITY: ICD-10-CM

## 2024-09-09 PROCEDURE — 99214 OFFICE O/P EST MOD 30 MIN: CPT

## 2024-09-09 NOTE — PHYSICAL EXAM
[Ankle Swelling (On Exam)] : not present [Varicose Veins Of Lower Extremities] : not present [] : not present [de-identified] : alert and oriented in NAD [FreeTextEntry1] : Posterior tibialis: left 0. Dorsalis pedis: right 1+ and left 0. Left 0.5x0.5mm punctate ulceration over lateral malleolus, becoming more superficial and closing.

## 2024-09-09 NOTE — PLAN
[TextEntry] : 72 yo female with hx of PAD and ischemic/pressure foot wound of left ankle. Occluded distal SFA-pop stent and failed attempt on 4/5/24 to pass occlusion during angiogram with Dr. Middleton. - Continue Neosporin to wound and avoid pressure to area. She will rtc in 3 months, if wound continues to heal will hold off on intervention for now. Continue risk factor control and rtc sooner for any new or worsening symptoms in leg or foot.

## 2024-09-09 NOTE — HISTORY OF PRESENT ILLNESS
[FreeTextEntry1] : This is a 73-year-old female with past medical history of CAD, type 1 diabetes, TIA, hyperlipidemia, hypertension, MS with left sided weakness who presents for follow-up for left lateral malleolus ulceration that opened 1.5 years ago. She underwent SFA-popliteal stent placement > 10 years ago with Dr. Middleton and recent angiogram 4/5/24 with failed attempt to pass occlusion. She is mobile and ambulates with a walker. Since last visit she is avoiding sleeping on her side and avoiding increased pressure. She now applies Neosporin daily.  She denies claudication or rest pain.  Denies worsening pain, drainage, fevers or chills.  MRI ordered did not reveal osteomyelitis. Distal EIA, CFA, DFA, patent with stenosis. >75% at the proximal SFA. Diminished waveforms mid SFA. Distal SFA and proximal popliteal artery heavily calcified. Diminished flow in distal pop, TPT, peroneal and PTA. REMBERTO is patent with short segment heavy calcification.

## 2024-09-09 NOTE — PHYSICAL EXAM
[Ankle Swelling (On Exam)] : not present [Varicose Veins Of Lower Extremities] : not present [] : not present [de-identified] : alert and oriented in NAD [FreeTextEntry1] : Posterior tibialis: left 0. Dorsalis pedis: right 1+ and left 0. Left 0.5x0.5mm punctate ulceration over lateral malleolus, becoming more superficial and closing.

## 2024-09-12 ENCOUNTER — RX RENEWAL (OUTPATIENT)
Age: 74
End: 2024-09-12

## 2024-09-13 ENCOUNTER — RX RENEWAL (OUTPATIENT)
Age: 74
End: 2024-09-13

## 2024-09-18 ENCOUNTER — RX RENEWAL (OUTPATIENT)
Age: 74
End: 2024-09-18

## 2024-09-21 ENCOUNTER — NON-APPOINTMENT (OUTPATIENT)
Age: 74
End: 2024-09-21

## 2024-09-23 ENCOUNTER — OFFICE (OUTPATIENT)
Dept: URBAN - METROPOLITAN AREA CLINIC 1 | Facility: CLINIC | Age: 74
Setting detail: OPHTHALMOLOGY
End: 2024-09-23
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
## 2024-09-23 PROCEDURE — NO SHOW FE NO SHOW FEE: Performed by: OPHTHALMOLOGY

## 2024-09-30 ENCOUNTER — APPOINTMENT (OUTPATIENT)
Dept: ORTHOPEDIC SURGERY | Facility: CLINIC | Age: 74
End: 2024-09-30
Payer: MEDICARE

## 2024-09-30 ENCOUNTER — RX RENEWAL (OUTPATIENT)
Age: 74
End: 2024-09-30

## 2024-09-30 VITALS — BODY MASS INDEX: 23.04 KG/M2 | HEIGHT: 63 IN | WEIGHT: 130 LBS

## 2024-09-30 DIAGNOSIS — M19.012 PRIMARY OSTEOARTHRITIS, LEFT SHOULDER: ICD-10-CM

## 2024-09-30 DIAGNOSIS — M50.30 OTHER CERVICAL DISC DEGENERATION, UNSPECIFIED CERVICAL REGION: ICD-10-CM

## 2024-09-30 PROCEDURE — 99214 OFFICE O/P EST MOD 30 MIN: CPT

## 2024-09-30 NOTE — DISCUSSION/SUMMARY
[de-identified] : Assessment:   The patient presents with history, examination and imaging that are most consistent with a diagnosis of:  Rotator cuff arthropathy of left shoulder   The patient would like to pursue conservative measures at this time. After consideration of various non-operative treatment modalities, the patient would like to proceed with the modalities listed below   During this appointment the patient was examined, diagnoses were discussed and explained in a face to face manner. In addition extensive time was spent reviewing aforementioned diagnostic studies. Counseling including abnormal image results, differential diagnoses, treatment options, risk and benefits, lifestyle changes, current condition, and current medications was performed. Patient's comments, questions, and concerns were address and patient verbalized understanding.   ---------------------------     Plan: - Recommend a course of PT however patient declines.  -Will try chiropractic treatments.  -Will also refer back to Dr. Hess to discuss TPI's or further treatments.      Follow-up:  3 months

## 2024-09-30 NOTE — IMAGING
[de-identified] : The patient is a well appearing 73 year year old female of their stated age. Neck is supple & nontender to palpation. Negative Spurling's test.  Shoulder LEFT Inspection: Scapula Winging: Negative Deformity: None Erythema: None Ecchymosis: None Abrasions: None Effusion: Mild Crepitus: Moderate  Range of Motion: Active Forward Flexion:110 degrees  Passive Forward Flexion:130 degrees  Active IR : back pocket Active ER :30 degrees  Motor Exam: Forward Flexion: 4+ out of 5 Flexion Plane of Scapula: 5 out of 5 Abduction: 4+ out of 5 Internal Rotation: 5 out of 5 External Rotation: 4+ out of 5 Distal Motor Strength: 5 out of 5  Stability Testing: Anterior: 1+ Posterior: 1+ Sulcus N: 1+ Sulcus ER: 1+  Provocative Tests: Drop Arm: Negative Mcneil/Impingement: Positive Higbee: Positive X-Arm Adduction: Negative Belly Press: Negative Bear Hug: Negative Lift Off: Negative Apprehension: Negative Relocation: Negative Posterior Load & Shift: Negative  Palpation: AC Joint: Nontender Clavicle: Nontender SC Joint: Nontender Bicepital Groove: Positive Coracoid Process: Positive Pectoralis Minor Tendon: Nontender Pectoralis Major Tendon: Nontender & palpably intact Latissimus Dorsi: Nontender  Proximal Humerus: Positive Scapula Body: Nontender Medial Scapula Boarder: Nontender Scapula Spine: Nontender  Neurologic Exam: Sensation to Light Touch: Axillary: Grossly intact Ulnar: Grossly intact Radial: Grossly intact Median: Grossly intact Other:  N/A  Circulatory/Pulses: Ulnar: 2+ Radial: 2+ Other Pertinent Findings: None  Left  X-Ray Examination of the SHOULDER (2 views): no fractures,subluxations or dislocations. AC Joint Arthrosis  Left X-Ray Examination of the SCAPULA 1 or 2 views shows: no significant abnormalities there is Type II acromion. There is an acromial spur.

## 2024-09-30 NOTE — HISTORY OF PRESENT ILLNESS
[de-identified] : 09/30/2024: Pt here today for follow-up left shoulder. Pain and symptoms are the same. She does not feel the CSI helped. She still has pain into her shoulder and also pain into her neck.   08/28/24: Follow up for the left shoulder. patient took a fall in her house 2 weeks ago, no prior treatment since last visit for the shoulder. Reports difficulty sleeping at night due to her sleeping on her left side. Reports to experiencing sharp pain, taking Tylenol and Motrin as needed for the pain. Attending PT 2x a week. Ambulating with a walker. CSI was given on 06/03/24 with significant relief given.   The patient is a 73 year old RHD F who presents today complaining of LEFT shoulder pain. Date of Injury/Onset:  March 2024 Pain:    At Rest:  5/10 With Activity:    7/10 Mechanism of injury: No specific cause of injury/ trauma  Quality of symptoms: Constant aching and shooting pain down to elbow  Improves with: Nothing helps Worse with: Intermittent  Prior treatment/ Imaging: [facility]caob[facility] / Dr. Kang / PT  School/Sport/Position/Occupation:  Retired Additional Information: MS (not taking anything), using walker, Type 1 DM A1c 7.5

## 2024-10-02 ENCOUNTER — NON-APPOINTMENT (OUTPATIENT)
Age: 74
End: 2024-10-02

## 2024-10-04 ENCOUNTER — APPOINTMENT (OUTPATIENT)
Dept: PAIN MANAGEMENT | Facility: CLINIC | Age: 74
End: 2024-10-04

## 2024-10-07 ENCOUNTER — APPOINTMENT (OUTPATIENT)
Dept: PAIN MANAGEMENT | Facility: CLINIC | Age: 74
End: 2024-10-07
Payer: MEDICARE

## 2024-10-07 VITALS — BODY MASS INDEX: 23.04 KG/M2 | HEIGHT: 63 IN | WEIGHT: 130 LBS

## 2024-10-07 DIAGNOSIS — M75.02 ADHESIVE CAPSULITIS OF LEFT SHOULDER: ICD-10-CM

## 2024-10-07 DIAGNOSIS — M25.512 PAIN IN LEFT SHOULDER: ICD-10-CM

## 2024-10-07 DIAGNOSIS — M79.18 MYALGIA, OTHER SITE: ICD-10-CM

## 2024-10-07 PROCEDURE — 99213 OFFICE O/P EST LOW 20 MIN: CPT

## 2024-10-09 ENCOUNTER — RESULT REVIEW (OUTPATIENT)
Age: 74
End: 2024-10-09

## 2024-10-19 ENCOUNTER — RX RENEWAL (OUTPATIENT)
Age: 74
End: 2024-10-19

## 2024-10-23 ENCOUNTER — APPOINTMENT (OUTPATIENT)
Dept: ORTHOPEDIC SURGERY | Facility: CLINIC | Age: 74
End: 2024-10-23
Payer: MEDICARE

## 2024-10-23 VITALS — BODY MASS INDEX: 23.04 KG/M2 | HEIGHT: 63 IN | WEIGHT: 130 LBS

## 2024-10-23 DIAGNOSIS — M75.02 ADHESIVE CAPSULITIS OF LEFT SHOULDER: ICD-10-CM

## 2024-10-23 DIAGNOSIS — M19.012 PRIMARY OSTEOARTHRITIS, LEFT SHOULDER: ICD-10-CM

## 2024-10-23 DIAGNOSIS — M12.812 OTHER SPECIFIC ARTHROPATHIES, NOT ELSEWHERE CLASSIFIED, LEFT SHOULDER: ICD-10-CM

## 2024-10-23 PROCEDURE — 99214 OFFICE O/P EST MOD 30 MIN: CPT

## 2024-10-23 RX ORDER — DICLOFENAC SODIUM 75 MG/1
75 TABLET, DELAYED RELEASE ORAL
Qty: 42 | Refills: 1 | Status: ACTIVE | COMMUNITY
Start: 2024-10-23 | End: 1900-01-01

## 2024-10-29 ENCOUNTER — APPOINTMENT (OUTPATIENT)
Dept: PAIN MANAGEMENT | Facility: CLINIC | Age: 74
End: 2024-10-29

## 2024-11-04 ENCOUNTER — OFFICE (OUTPATIENT)
Dept: URBAN - METROPOLITAN AREA CLINIC 6 | Facility: CLINIC | Age: 74
Setting detail: OPHTHALMOLOGY
End: 2024-11-04
Payer: MEDICARE

## 2024-11-04 DIAGNOSIS — H52.4: ICD-10-CM

## 2024-11-04 DIAGNOSIS — H25.13: ICD-10-CM

## 2024-11-04 DIAGNOSIS — H01.002: ICD-10-CM

## 2024-11-04 DIAGNOSIS — H01.005: ICD-10-CM

## 2024-11-04 DIAGNOSIS — H01.004: ICD-10-CM

## 2024-11-04 DIAGNOSIS — H01.001: ICD-10-CM

## 2024-11-04 PROBLEM — H52.7 REFRACTIVE ERROR: Status: ACTIVE | Noted: 2024-11-04

## 2024-11-04 PROBLEM — E10.9 DIABETES TYPE 1 NO RETINOPATHY: Status: ACTIVE | Noted: 2024-11-04

## 2024-11-04 PROCEDURE — 99213 OFFICE O/P EST LOW 20 MIN: CPT

## 2024-11-04 PROCEDURE — 92015 DETERMINE REFRACTIVE STATE: CPT

## 2024-11-04 ASSESSMENT — REFRACTION_CURRENTRX
OD_CYLINDER: -0.75
OD_VPRISM_DIRECTION: PROGS
OS_ADD: +2.75
OD_AXIS: 044
OD_SPHERE: +2.00
OS_OVR_VA: 20/
OD_ADD: +2.75
OS_AXIS: 134
OS_VPRISM_DIRECTION: PROGS
OS_SPHERE: PLANO
OD_OVR_VA: 20/
OS_CYLINDER: -1.25

## 2024-11-04 ASSESSMENT — REFRACTION_MANIFEST
OU_VA: 20/50+2
OD_AXIS: 040
OD_CYLINDER: -1.25
OS_ADD: +2.75
OD_ADD: +2.75
OD_SPHERE: +1.50
OD_VA1: 20/30+1
OS_AXIS: 140
OD_SPHERE: +1.00
OD_AXIS: 45
OD_CYLINDER: -0.75
OS_VA1: 20/60-1
OS_CYLINDER: -2.25
OS_AXIS: 130
OS_VA1: 20/40-2
OS_SPHERE: -0.50
OS_CYLINDER: -1.75
OS_SPHERE: -0.50
OD_VA1: 20/50-1
OS_ADD: +2.75
OD_ADD: +2.75

## 2024-11-04 ASSESSMENT — REFRACTION_AUTOREFRACTION
OS_SPHERE: -0.25
OD_CYLINDER: -0.75
OD_AXIS: 043
OS_CYLINDER: -2.00
OD_SPHERE: +1.00
OS_AXIS: 131

## 2024-11-04 ASSESSMENT — KERATOMETRY
OS_K1POWER_DIOPTERS: 41.75
METHOD_AUTO_MANUAL: AUTO
OS_K2POWER_DIOPTERS: 42.50
OD_K2POWER_DIOPTERS: 42.75
OS_AXISANGLE_DEGREES: 054
OD_K1POWER_DIOPTERS: 41.75
OD_AXISANGLE_DEGREES: 119

## 2024-11-04 ASSESSMENT — TONOMETRY
OS_IOP_MMHG: 13
OD_IOP_MMHG: 13

## 2024-11-04 ASSESSMENT — LID EXAM ASSESSMENTS
OD_BLEPHARITIS: RLL RUL 1+
OS_BLEPHARITIS: LLL LUL 1+

## 2024-11-04 ASSESSMENT — CONFRONTATIONAL VISUAL FIELD TEST (CVF)
OS_FINDINGS: FULL
OD_FINDINGS: FULL

## 2024-11-04 ASSESSMENT — VISUAL ACUITY
OS_BCVA: 20/30+
OD_BCVA: 20/40+

## 2024-11-06 ENCOUNTER — APPOINTMENT (OUTPATIENT)
Dept: ORTHOPEDIC SURGERY | Facility: CLINIC | Age: 74
End: 2024-11-06

## 2024-11-08 DIAGNOSIS — Z23 ENCOUNTER FOR IMMUNIZATION: ICD-10-CM

## 2024-11-13 ENCOUNTER — APPOINTMENT (OUTPATIENT)
Dept: ORTHOPEDIC SURGERY | Facility: CLINIC | Age: 74
End: 2024-11-13

## 2024-11-13 VITALS — HEIGHT: 63 IN | BODY MASS INDEX: 23.04 KG/M2 | WEIGHT: 130 LBS

## 2024-11-13 DIAGNOSIS — M75.02 ADHESIVE CAPSULITIS OF LEFT SHOULDER: ICD-10-CM

## 2024-11-13 DIAGNOSIS — M12.812 OTHER SPECIFIC ARTHROPATHIES, NOT ELSEWHERE CLASSIFIED, LEFT SHOULDER: ICD-10-CM

## 2024-11-13 DIAGNOSIS — M19.012 PRIMARY OSTEOARTHRITIS, LEFT SHOULDER: ICD-10-CM

## 2024-11-13 PROCEDURE — 20610 DRAIN/INJ JOINT/BURSA W/O US: CPT | Mod: LT

## 2024-11-13 PROCEDURE — 99214 OFFICE O/P EST MOD 30 MIN: CPT | Mod: 25

## 2024-11-14 ENCOUNTER — RX RENEWAL (OUTPATIENT)
Age: 74
End: 2024-11-14

## 2024-11-15 ENCOUNTER — RX RENEWAL (OUTPATIENT)
Age: 74
End: 2024-11-15

## 2024-11-20 ENCOUNTER — APPOINTMENT (OUTPATIENT)
Dept: FAMILY MEDICINE | Facility: CLINIC | Age: 74
End: 2024-11-20

## 2024-12-09 ENCOUNTER — APPOINTMENT (OUTPATIENT)
Dept: FAMILY MEDICINE | Facility: CLINIC | Age: 74
End: 2024-12-09
Payer: MEDICARE

## 2024-12-09 VITALS — DIASTOLIC BLOOD PRESSURE: 80 MMHG | HEART RATE: 72 BPM | SYSTOLIC BLOOD PRESSURE: 130 MMHG | RESPIRATION RATE: 16 BRPM

## 2024-12-09 VITALS
WEIGHT: 130 LBS | TEMPERATURE: 97.6 F | SYSTOLIC BLOOD PRESSURE: 150 MMHG | OXYGEN SATURATION: 97 % | HEART RATE: 78 BPM | DIASTOLIC BLOOD PRESSURE: 72 MMHG | HEIGHT: 63 IN | BODY MASS INDEX: 23.04 KG/M2

## 2024-12-09 DIAGNOSIS — E11.9 TYPE 2 DIABETES MELLITUS W/OUT COMPLICATIONS: ICD-10-CM

## 2024-12-09 DIAGNOSIS — E78.5 HYPERLIPIDEMIA, UNSPECIFIED: ICD-10-CM

## 2024-12-09 DIAGNOSIS — I10 ESSENTIAL (PRIMARY) HYPERTENSION: ICD-10-CM

## 2024-12-09 DIAGNOSIS — I73.9 PERIPHERAL VASCULAR DISEASE, UNSPECIFIED: ICD-10-CM

## 2024-12-09 DIAGNOSIS — I25.10 ATHEROSCLEROTIC HEART DISEASE OF NATIVE CORONARY ARTERY W/OUT ANGINA PECTORIS: ICD-10-CM

## 2024-12-09 PROCEDURE — G2211 COMPLEX E/M VISIT ADD ON: CPT

## 2024-12-09 PROCEDURE — 99214 OFFICE O/P EST MOD 30 MIN: CPT

## 2024-12-10 LAB
ALBUMIN SERPL ELPH-MCNC: 4.1 G/DL
ALP BLD-CCNC: 106 U/L
ALT SERPL-CCNC: 14 U/L
ANION GAP SERPL CALC-SCNC: 12 MMOL/L
AST SERPL-CCNC: 12 U/L
BASOPHILS # BLD AUTO: 0.03 K/UL
BASOPHILS NFR BLD AUTO: 0.4 %
BILIRUB SERPL-MCNC: <0.2 MG/DL
BUN SERPL-MCNC: 18 MG/DL
CALCIUM SERPL-MCNC: 9.8 MG/DL
CHLORIDE SERPL-SCNC: 105 MMOL/L
CHOLEST SERPL-MCNC: 215 MG/DL
CO2 SERPL-SCNC: 27 MMOL/L
CREAT SERPL-MCNC: 0.69 MG/DL
EGFR: 90 ML/MIN/1.73M2
EOSINOPHIL # BLD AUTO: 0.12 K/UL
EOSINOPHIL NFR BLD AUTO: 1.4 %
ESTIMATED AVERAGE GLUCOSE: 180 MG/DL
GLUCOSE SERPL-MCNC: 41 MG/DL
HBA1C MFR BLD HPLC: 7.9 %
HCT VFR BLD CALC: 37 %
HDLC SERPL-MCNC: 75 MG/DL
HGB BLD-MCNC: 11.5 G/DL
IMM GRANULOCYTES NFR BLD AUTO: 0.2 %
LDLC SERPL CALC-MCNC: 118 MG/DL
LYMPHOCYTES # BLD AUTO: 1.44 K/UL
LYMPHOCYTES NFR BLD AUTO: 17.3 %
MAN DIFF?: NORMAL
MCHC RBC-ENTMCNC: 29.7 PG
MCHC RBC-ENTMCNC: 31.1 G/DL
MCV RBC AUTO: 95.6 FL
MONOCYTES # BLD AUTO: 0.61 K/UL
MONOCYTES NFR BLD AUTO: 7.3 %
NEUTROPHILS # BLD AUTO: 6.12 K/UL
NEUTROPHILS NFR BLD AUTO: 73.4 %
NONHDLC SERPL-MCNC: 140 MG/DL
PLATELET # BLD AUTO: 323 K/UL
POTASSIUM SERPL-SCNC: 4.9 MMOL/L
PROT SERPL-MCNC: 6.6 G/DL
RBC # BLD: 3.87 M/UL
RBC # FLD: 13.2 %
SODIUM SERPL-SCNC: 145 MMOL/L
T4 SERPL-MCNC: 6.5 UG/DL
TRIGL SERPL-MCNC: 128 MG/DL
TSH SERPL-ACNC: 1.6 UIU/ML
URATE SERPL-MCNC: 3.5 MG/DL
WBC # FLD AUTO: 8.34 K/UL

## 2025-02-03 ENCOUNTER — APPOINTMENT (OUTPATIENT)
Dept: ORTHOPEDIC SURGERY | Facility: CLINIC | Age: 75
End: 2025-02-03
Payer: MEDICARE

## 2025-02-03 VITALS — WEIGHT: 130 LBS | BODY MASS INDEX: 23.04 KG/M2 | HEIGHT: 63 IN

## 2025-02-03 DIAGNOSIS — M19.012 PRIMARY OSTEOARTHRITIS, LEFT SHOULDER: ICD-10-CM

## 2025-02-03 DIAGNOSIS — M75.02 ADHESIVE CAPSULITIS OF LEFT SHOULDER: ICD-10-CM

## 2025-02-03 DIAGNOSIS — M25.512 PAIN IN LEFT SHOULDER: ICD-10-CM

## 2025-02-03 PROCEDURE — J3490M: CUSTOM | Mod: NC

## 2025-02-03 PROCEDURE — 99214 OFFICE O/P EST MOD 30 MIN: CPT | Mod: 25

## 2025-02-03 PROCEDURE — 20610 DRAIN/INJ JOINT/BURSA W/O US: CPT | Mod: LT

## 2025-02-12 ENCOUNTER — OFFICE (OUTPATIENT)
Dept: URBAN - METROPOLITAN AREA CLINIC 1 | Facility: CLINIC | Age: 75
Setting detail: OPHTHALMOLOGY
End: 2025-02-12
Payer: MEDICARE

## 2025-02-12 DIAGNOSIS — H01.005: ICD-10-CM

## 2025-02-12 DIAGNOSIS — H43.811: ICD-10-CM

## 2025-02-12 DIAGNOSIS — H01.001: ICD-10-CM

## 2025-02-12 DIAGNOSIS — E10.9: ICD-10-CM

## 2025-02-12 DIAGNOSIS — H16.231: ICD-10-CM

## 2025-02-12 DIAGNOSIS — G50.0: ICD-10-CM

## 2025-02-12 DIAGNOSIS — H16.223: ICD-10-CM

## 2025-02-12 DIAGNOSIS — H01.004: ICD-10-CM

## 2025-02-12 DIAGNOSIS — H35.3131: ICD-10-CM

## 2025-02-12 DIAGNOSIS — H01.002: ICD-10-CM

## 2025-02-12 DIAGNOSIS — H25.13: ICD-10-CM

## 2025-02-12 PROCEDURE — 99214 OFFICE O/P EST MOD 30 MIN: CPT | Performed by: OPHTHALMOLOGY

## 2025-02-12 ASSESSMENT — REFRACTION_CURRENTRX
OD_SPHERE: +2.25
OD_CYLINDER: -0.75
OS_CYLINDER: -1.25
OD_AXIS: 043
OS_OVR_VA: 20/
OD_ADD: +2.25
OS_VPRISM_DIRECTION: PROGS
OS_AXIS: 137
OD_OVR_VA: 20/
OD_VPRISM_DIRECTION: PROGS
OS_ADD: +2.25
OS_SPHERE: PLANO

## 2025-02-12 ASSESSMENT — REFRACTION_MANIFEST
OD_ADD: +2.75
OS_AXIS: 135
OD_VA1: 20/50-1
OD_VA1: 20/30+1
OS_CYLINDER: -2.00
OS_ADD: +2.75
OD_AXIS: 45
OD_ADD: +2.75
OS_VA1: 20/50-1
OD_SPHERE: +1.00
OS_VA1: 20/60-1
OS_SPHERE: -0.50
OS_SPHERE: -1.25
OU_VA: 20/50+2
OD_CYLINDER: -0.75
OS_CYLINDER: -1.75
OD_AXIS: 035
OS_ADD: +2.75
OS_AXIS: 130
OD_SPHERE: +1.00
OD_CYLINDER: -1.50

## 2025-02-12 ASSESSMENT — REFRACTION_AUTOREFRACTION
OS_AXIS: 134
OD_SPHERE: +1.00
OS_CYLINDER: -2.00
OS_SPHERE: -1.25
OD_AXIS: 033
OD_CYLINDER: -1.50

## 2025-02-12 ASSESSMENT — KERATOMETRY
METHOD_AUTO_MANUAL: AUTO
OD_AXISANGLE_DEGREES: 119
OD_K2POWER_DIOPTERS: 42.75
OS_K2POWER_DIOPTERS: 42.50
OS_K1POWER_DIOPTERS: 41.75
OD_K1POWER_DIOPTERS: 41.75
OS_AXISANGLE_DEGREES: 054

## 2025-02-12 ASSESSMENT — LID EXAM ASSESSMENTS
OS_BLEPHARITIS: LLL LUL 1+
OD_BLEPHARITIS: RLL RUL 1+

## 2025-02-12 ASSESSMENT — VISUAL ACUITY
OS_BCVA: 20/80+1
OD_BCVA: 20/50+2

## 2025-02-12 ASSESSMENT — TONOMETRY
OD_IOP_MMHG: 18
OS_IOP_MMHG: 14

## 2025-02-12 ASSESSMENT — CONFRONTATIONAL VISUAL FIELD TEST (CVF)
OD_FINDINGS: FULL
OS_FINDINGS: FULL

## 2025-02-28 ENCOUNTER — APPOINTMENT (OUTPATIENT)
Dept: FAMILY MEDICINE | Facility: CLINIC | Age: 75
End: 2025-02-28
Payer: MEDICARE

## 2025-02-28 DIAGNOSIS — Z09 ENCOUNTER FOR FOLLOW-UP EXAMINATION AFTER COMPLETED TREATMENT FOR CONDITIONS OTHER THAN MALIGNANT NEOPLASM: ICD-10-CM

## 2025-02-28 DIAGNOSIS — E11.9 TYPE 2 DIABETES MELLITUS W/OUT COMPLICATIONS: ICD-10-CM

## 2025-02-28 DIAGNOSIS — E78.5 HYPERLIPIDEMIA, UNSPECIFIED: ICD-10-CM

## 2025-02-28 DIAGNOSIS — I10 ESSENTIAL (PRIMARY) HYPERTENSION: ICD-10-CM

## 2025-02-28 PROCEDURE — 99215 OFFICE O/P EST HI 40 MIN: CPT | Mod: 2W

## 2025-02-28 PROCEDURE — G2211 COMPLEX E/M VISIT ADD ON: CPT | Mod: 2W

## 2025-03-21 ENCOUNTER — APPOINTMENT (OUTPATIENT)
Dept: VASCULAR SURGERY | Facility: CLINIC | Age: 75
End: 2025-03-21
Payer: MEDICARE

## 2025-03-21 VITALS
OXYGEN SATURATION: 100 % | RESPIRATION RATE: 16 BRPM | DIASTOLIC BLOOD PRESSURE: 74 MMHG | HEART RATE: 76 BPM | SYSTOLIC BLOOD PRESSURE: 129 MMHG | TEMPERATURE: 97.6 F

## 2025-03-21 DIAGNOSIS — I73.9 PERIPHERAL VASCULAR DISEASE, UNSPECIFIED: ICD-10-CM

## 2025-03-21 DIAGNOSIS — L97.909 PERIPHERAL VASCULAR DISEASE, UNSPECIFIED: ICD-10-CM

## 2025-03-21 PROCEDURE — 99214 OFFICE O/P EST MOD 30 MIN: CPT

## 2025-03-27 ENCOUNTER — APPOINTMENT (OUTPATIENT)
Dept: CT IMAGING | Facility: CLINIC | Age: 75
End: 2025-03-27

## 2025-03-27 ENCOUNTER — OUTPATIENT (OUTPATIENT)
Dept: OUTPATIENT SERVICES | Facility: HOSPITAL | Age: 75
LOS: 1 days | End: 2025-03-27

## 2025-03-27 DIAGNOSIS — I73.9 PERIPHERAL VASCULAR DISEASE, UNSPECIFIED: ICD-10-CM

## 2025-03-27 PROCEDURE — 75635 CT ANGIO ABDOMINAL ARTERIES: CPT | Mod: 26

## 2025-04-07 ENCOUNTER — APPOINTMENT (OUTPATIENT)
Dept: ORTHOPEDIC SURGERY | Facility: CLINIC | Age: 75
End: 2025-04-07

## 2025-04-21 ENCOUNTER — APPOINTMENT (OUTPATIENT)
Dept: VASCULAR SURGERY | Facility: HOSPITAL | Age: 75
End: 2025-04-21

## 2025-04-21 ENCOUNTER — OUTPATIENT (OUTPATIENT)
Dept: OUTPATIENT SERVICES | Facility: HOSPITAL | Age: 75
LOS: 1 days | End: 2025-04-21
Payer: MEDICARE

## 2025-04-21 ENCOUNTER — INPATIENT (INPATIENT)
Facility: HOSPITAL | Age: 75
LOS: 9 days | Discharge: HOSPICE | DRG: 871 | End: 2025-05-01
Attending: HOSPITALIST | Admitting: INTERNAL MEDICINE
Payer: MEDICARE

## 2025-04-21 VITALS
OXYGEN SATURATION: 100 % | RESPIRATION RATE: 17 BRPM | DIASTOLIC BLOOD PRESSURE: 65 MMHG | TEMPERATURE: 98 F | HEART RATE: 86 BPM | WEIGHT: 125 LBS | SYSTOLIC BLOOD PRESSURE: 119 MMHG | HEIGHT: 63 IN

## 2025-04-21 DIAGNOSIS — E11.9 TYPE 2 DIABETES MELLITUS WITHOUT COMPLICATIONS: ICD-10-CM

## 2025-04-21 DIAGNOSIS — I10 ESSENTIAL (PRIMARY) HYPERTENSION: ICD-10-CM

## 2025-04-21 DIAGNOSIS — I73.9 PERIPHERAL VASCULAR DISEASE, UNSPECIFIED: ICD-10-CM

## 2025-04-21 DIAGNOSIS — S91.309A UNSPECIFIED OPEN WOUND, UNSPECIFIED FOOT, INITIAL ENCOUNTER: ICD-10-CM

## 2025-04-21 DIAGNOSIS — E11.65 TYPE 2 DIABETES MELLITUS WITH HYPERGLYCEMIA: ICD-10-CM

## 2025-04-21 DIAGNOSIS — Z29.9 ENCOUNTER FOR PROPHYLACTIC MEASURES, UNSPECIFIED: ICD-10-CM

## 2025-04-21 DIAGNOSIS — G30.9 ALZHEIMER'S DISEASE, UNSPECIFIED: ICD-10-CM

## 2025-04-21 DIAGNOSIS — E78.5 HYPERLIPIDEMIA, UNSPECIFIED: ICD-10-CM

## 2025-04-21 LAB
ALBUMIN SERPL ELPH-MCNC: 2.6 G/DL — LOW (ref 3.3–5)
ALP SERPL-CCNC: 114 U/L — SIGNIFICANT CHANGE UP (ref 40–120)
ALT FLD-CCNC: 12 U/L — SIGNIFICANT CHANGE UP (ref 12–78)
ANION GAP SERPL CALC-SCNC: 17 MMOL/L — SIGNIFICANT CHANGE UP (ref 5–17)
AST SERPL-CCNC: 27 U/L — SIGNIFICANT CHANGE UP (ref 15–37)
BASOPHILS # BLD AUTO: 0.07 K/UL — SIGNIFICANT CHANGE UP (ref 0–0.2)
BASOPHILS NFR BLD AUTO: 0.4 % — SIGNIFICANT CHANGE UP (ref 0–2)
BILIRUB SERPL-MCNC: 0.9 MG/DL — SIGNIFICANT CHANGE UP (ref 0.2–1.2)
BUN SERPL-MCNC: 21 MG/DL — SIGNIFICANT CHANGE UP (ref 7–23)
CALCIUM SERPL-MCNC: 9.8 MG/DL — SIGNIFICANT CHANGE UP (ref 8.5–10.1)
CHLORIDE SERPL-SCNC: 100 MMOL/L — SIGNIFICANT CHANGE UP (ref 96–108)
CO2 SERPL-SCNC: 21 MMOL/L — LOW (ref 22–31)
CREAT SERPL-MCNC: 0.76 MG/DL — SIGNIFICANT CHANGE UP (ref 0.5–1.3)
CRP SERPL-MCNC: 36 MG/L — HIGH
EGFR: 81 ML/MIN/1.73M2 — SIGNIFICANT CHANGE UP
EGFR: 81 ML/MIN/1.73M2 — SIGNIFICANT CHANGE UP
EOSINOPHIL # BLD AUTO: 0.06 K/UL — SIGNIFICANT CHANGE UP (ref 0–0.5)
EOSINOPHIL NFR BLD AUTO: 0.4 % — SIGNIFICANT CHANGE UP (ref 0–6)
ERYTHROCYTE [SEDIMENTATION RATE] IN BLOOD: 70 MM/HR — HIGH (ref 0–20)
GLUCOSE SERPL-MCNC: 410 MG/DL — HIGH (ref 70–99)
HCT VFR BLD CALC: 32.7 % — LOW (ref 34.5–45)
HGB BLD-MCNC: 10.4 G/DL — LOW (ref 11.5–15.5)
IMM GRANULOCYTES NFR BLD AUTO: 0.5 % — SIGNIFICANT CHANGE UP (ref 0–0.9)
LACTATE SERPL-SCNC: 1.9 MMOL/L — SIGNIFICANT CHANGE UP (ref 0.7–2)
LYMPHOCYTES # BLD AUTO: 1.09 K/UL — SIGNIFICANT CHANGE UP (ref 1–3.3)
LYMPHOCYTES # BLD AUTO: 6.8 % — LOW (ref 13–44)
MAGNESIUM SERPL-MCNC: 2.2 MG/DL — SIGNIFICANT CHANGE UP (ref 1.6–2.6)
MCHC RBC-ENTMCNC: 29.5 PG — SIGNIFICANT CHANGE UP (ref 27–34)
MCHC RBC-ENTMCNC: 31.8 G/DL — LOW (ref 32–36)
MCV RBC AUTO: 92.6 FL — SIGNIFICANT CHANGE UP (ref 80–100)
MONOCYTES # BLD AUTO: 0.56 K/UL — SIGNIFICANT CHANGE UP (ref 0–0.9)
MONOCYTES NFR BLD AUTO: 3.5 % — SIGNIFICANT CHANGE UP (ref 2–14)
NEUTROPHILS # BLD AUTO: 14.06 K/UL — HIGH (ref 1.8–7.4)
NEUTROPHILS NFR BLD AUTO: 88.4 % — HIGH (ref 43–77)
NRBC BLD AUTO-RTO: 0 /100 WBCS — SIGNIFICANT CHANGE UP (ref 0–0)
PLATELET # BLD AUTO: 415 K/UL — HIGH (ref 150–400)
POTASSIUM SERPL-MCNC: 5 MMOL/L — SIGNIFICANT CHANGE UP (ref 3.5–5.3)
POTASSIUM SERPL-SCNC: 5 MMOL/L — SIGNIFICANT CHANGE UP (ref 3.5–5.3)
PROT SERPL-MCNC: 7 G/DL — SIGNIFICANT CHANGE UP (ref 6–8.3)
RBC # BLD: 3.53 M/UL — LOW (ref 3.8–5.2)
RBC # FLD: 14.3 % — SIGNIFICANT CHANGE UP (ref 10.3–14.5)
SODIUM SERPL-SCNC: 138 MMOL/L — SIGNIFICANT CHANGE UP (ref 135–145)
WBC # BLD: 15.92 K/UL — HIGH (ref 3.8–10.5)
WBC # FLD AUTO: 15.92 K/UL — HIGH (ref 3.8–10.5)

## 2025-04-21 PROCEDURE — 99223 1ST HOSP IP/OBS HIGH 75: CPT

## 2025-04-21 PROCEDURE — 86850 RBC ANTIBODY SCREEN: CPT

## 2025-04-21 PROCEDURE — 71045 X-RAY EXAM CHEST 1 VIEW: CPT | Mod: 26

## 2025-04-21 PROCEDURE — G0545: CPT

## 2025-04-21 PROCEDURE — 80048 BASIC METABOLIC PNL TOTAL CA: CPT

## 2025-04-21 PROCEDURE — 86900 BLOOD TYPING SEROLOGIC ABO: CPT

## 2025-04-21 PROCEDURE — 86901 BLOOD TYPING SEROLOGIC RH(D): CPT

## 2025-04-21 PROCEDURE — 93970 EXTREMITY STUDY: CPT | Mod: 26

## 2025-04-21 PROCEDURE — 36415 COLL VENOUS BLD VENIPUNCTURE: CPT

## 2025-04-21 PROCEDURE — 93010 ELECTROCARDIOGRAM REPORT: CPT

## 2025-04-21 PROCEDURE — 99222 1ST HOSP IP/OBS MODERATE 55: CPT

## 2025-04-21 PROCEDURE — 82962 GLUCOSE BLOOD TEST: CPT

## 2025-04-21 RX ORDER — ASPIRIN 325 MG
81 TABLET ORAL DAILY
Refills: 0 | Status: DISCONTINUED | OUTPATIENT
Start: 2025-04-21 | End: 2025-04-30

## 2025-04-21 RX ORDER — VANCOMYCIN HCL IN 5 % DEXTROSE 1.5G/250ML
1000 PLASTIC BAG, INJECTION (ML) INTRAVENOUS ONCE
Refills: 0 | Status: COMPLETED | OUTPATIENT
Start: 2025-04-21 | End: 2025-04-21

## 2025-04-21 RX ORDER — DEXTROSE 50 % IN WATER 50 %
25 SYRINGE (ML) INTRAVENOUS ONCE
Refills: 0 | Status: DISCONTINUED | OUTPATIENT
Start: 2025-04-21 | End: 2025-04-30

## 2025-04-21 RX ORDER — DEXTROSE 50 % IN WATER 50 %
15 SYRINGE (ML) INTRAVENOUS ONCE
Refills: 0 | Status: DISCONTINUED | OUTPATIENT
Start: 2025-04-21 | End: 2025-04-30

## 2025-04-21 RX ORDER — PIPERACILLIN-TAZO-DEXTROSE,ISO 2.25G/50ML
3.38 IV SOLUTION, PIGGYBACK PREMIX FROZEN(ML) INTRAVENOUS EVERY 8 HOURS
Refills: 0 | Status: DISCONTINUED | OUTPATIENT
Start: 2025-04-21 | End: 2025-04-24

## 2025-04-21 RX ORDER — INSULIN LISPRO 100 U/ML
INJECTION, SOLUTION INTRAVENOUS; SUBCUTANEOUS
Refills: 0 | Status: DISCONTINUED | OUTPATIENT
Start: 2025-04-21 | End: 2025-04-23

## 2025-04-21 RX ORDER — DEXTROSE 50 % IN WATER 50 %
12.5 SYRINGE (ML) INTRAVENOUS ONCE
Refills: 0 | Status: DISCONTINUED | OUTPATIENT
Start: 2025-04-21 | End: 2025-04-30

## 2025-04-21 RX ORDER — APIXABAN 2.5 MG/1
5 TABLET, FILM COATED ORAL EVERY 12 HOURS
Refills: 0 | Status: DISCONTINUED | OUTPATIENT
Start: 2025-04-21 | End: 2025-04-30

## 2025-04-21 RX ORDER — INSULIN LISPRO 100 U/ML
5 INJECTION, SOLUTION INTRAVENOUS; SUBCUTANEOUS
Refills: 0 | Status: DISCONTINUED | OUTPATIENT
Start: 2025-04-21 | End: 2025-04-26

## 2025-04-21 RX ORDER — TRAMADOL HYDROCHLORIDE 50 MG/1
50 TABLET, FILM COATED ORAL ONCE
Refills: 0 | Status: DISCONTINUED | OUTPATIENT
Start: 2025-04-21 | End: 2025-04-21

## 2025-04-21 RX ORDER — INSULIN LISPRO 100 U/ML
INJECTION, SOLUTION INTRAVENOUS; SUBCUTANEOUS AT BEDTIME
Refills: 0 | Status: DISCONTINUED | OUTPATIENT
Start: 2025-04-21 | End: 2025-04-23

## 2025-04-21 RX ORDER — INSULIN LISPRO 100 U/ML
INJECTION, SOLUTION INTRAVENOUS; SUBCUTANEOUS AT BEDTIME
Refills: 0 | Status: DISCONTINUED | OUTPATIENT
Start: 2025-04-21 | End: 2025-04-21

## 2025-04-21 RX ORDER — GLUCAGON 3 MG/1
1 POWDER NASAL ONCE
Refills: 0 | Status: DISCONTINUED | OUTPATIENT
Start: 2025-04-21 | End: 2025-04-30

## 2025-04-21 RX ORDER — SODIUM CHLORIDE 9 G/1000ML
1000 INJECTION, SOLUTION INTRAVENOUS
Refills: 0 | Status: DISCONTINUED | OUTPATIENT
Start: 2025-04-21 | End: 2025-04-30

## 2025-04-21 RX ORDER — INSULIN GLARGINE-YFGN 100 [IU]/ML
15 INJECTION, SOLUTION SUBCUTANEOUS
Refills: 0 | Status: DISCONTINUED | OUTPATIENT
Start: 2025-04-22 | End: 2025-04-27

## 2025-04-21 RX ORDER — INSULIN LISPRO 100 U/ML
INJECTION, SOLUTION INTRAVENOUS; SUBCUTANEOUS
Refills: 0 | Status: DISCONTINUED | OUTPATIENT
Start: 2025-04-21 | End: 2025-04-21

## 2025-04-21 RX ORDER — DONEPEZIL HYDROCHLORIDE 5 MG/1
5 TABLET ORAL AT BEDTIME
Refills: 0 | Status: DISCONTINUED | OUTPATIENT
Start: 2025-04-21 | End: 2025-05-01

## 2025-04-21 RX ORDER — PIPERACILLIN-TAZO-DEXTROSE,ISO 2.25G/50ML
3.38 IV SOLUTION, PIGGYBACK PREMIX FROZEN(ML) INTRAVENOUS ONCE
Refills: 0 | Status: COMPLETED | OUTPATIENT
Start: 2025-04-21 | End: 2025-04-21

## 2025-04-21 RX ORDER — SODIUM CHLORIDE 9 G/1000ML
1000 INJECTION, SOLUTION INTRAVENOUS ONCE
Refills: 0 | Status: COMPLETED | OUTPATIENT
Start: 2025-04-21 | End: 2025-04-21

## 2025-04-21 RX ORDER — ONDANSETRON HCL/PF 4 MG/2 ML
4 VIAL (ML) INJECTION EVERY 6 HOURS
Refills: 0 | Status: DISCONTINUED | OUTPATIENT
Start: 2025-04-21 | End: 2025-05-01

## 2025-04-21 RX ORDER — ATORVASTATIN CALCIUM 80 MG/1
20 TABLET, FILM COATED ORAL AT BEDTIME
Refills: 0 | Status: DISCONTINUED | OUTPATIENT
Start: 2025-04-21 | End: 2025-04-30

## 2025-04-21 RX ORDER — MELATONIN 5 MG
3 TABLET ORAL AT BEDTIME
Refills: 0 | Status: DISCONTINUED | OUTPATIENT
Start: 2025-04-21 | End: 2025-05-01

## 2025-04-21 RX ORDER — FUROSEMIDE 10 MG/ML
20 INJECTION INTRAMUSCULAR; INTRAVENOUS DAILY
Refills: 0 | Status: DISCONTINUED | OUTPATIENT
Start: 2025-04-21 | End: 2025-04-23

## 2025-04-21 RX ORDER — AMLODIPINE BESYLATE 10 MG/1
10 TABLET ORAL DAILY
Refills: 0 | Status: DISCONTINUED | OUTPATIENT
Start: 2025-04-21 | End: 2025-04-23

## 2025-04-21 RX ORDER — INSULIN LISPRO 100 U/ML
5 INJECTION, SOLUTION INTRAVENOUS; SUBCUTANEOUS ONCE
Refills: 0 | Status: COMPLETED | OUTPATIENT
Start: 2025-04-21 | End: 2025-04-21

## 2025-04-21 RX ORDER — INSULIN LISPRO 100 U/ML
10 INJECTION, SOLUTION INTRAVENOUS; SUBCUTANEOUS ONCE
Refills: 0 | Status: COMPLETED | OUTPATIENT
Start: 2025-04-21 | End: 2025-04-21

## 2025-04-21 RX ORDER — LISINOPRIL 5 MG/1
40 TABLET ORAL DAILY
Refills: 0 | Status: DISCONTINUED | OUTPATIENT
Start: 2025-04-21 | End: 2025-05-01

## 2025-04-21 RX ORDER — ACETAMINOPHEN 500 MG/5ML
650 LIQUID (ML) ORAL EVERY 6 HOURS
Refills: 0 | Status: DISCONTINUED | OUTPATIENT
Start: 2025-04-21 | End: 2025-04-30

## 2025-04-21 RX ADMIN — ATORVASTATIN CALCIUM 20 MILLIGRAM(S): 80 TABLET, FILM COATED ORAL at 21:37

## 2025-04-21 RX ADMIN — SODIUM CHLORIDE 1000 MILLILITER(S): 9 INJECTION, SOLUTION INTRAVENOUS at 09:34

## 2025-04-21 RX ADMIN — APIXABAN 5 MILLIGRAM(S): 2.5 TABLET, FILM COATED ORAL at 18:26

## 2025-04-21 RX ADMIN — TRAMADOL HYDROCHLORIDE 50 MILLIGRAM(S): 50 TABLET, FILM COATED ORAL at 21:37

## 2025-04-21 RX ADMIN — INSULIN LISPRO 10 UNIT(S): 100 INJECTION, SOLUTION INTRAVENOUS; SUBCUTANEOUS at 12:17

## 2025-04-21 RX ADMIN — Medication 250 MILLIGRAM(S): at 10:06

## 2025-04-21 RX ADMIN — Medication 200 GRAM(S): at 09:34

## 2025-04-21 RX ADMIN — INSULIN LISPRO 10: 100 INJECTION, SOLUTION INTRAVENOUS; SUBCUTANEOUS at 17:37

## 2025-04-21 RX ADMIN — Medication 55 MILLILITER(S): at 18:30

## 2025-04-21 RX ADMIN — Medication 25 GRAM(S): at 18:30

## 2025-04-21 RX ADMIN — INSULIN LISPRO 5 UNIT(S): 100 INJECTION, SOLUTION INTRAVENOUS; SUBCUTANEOUS at 17:36

## 2025-04-21 RX ADMIN — DONEPEZIL HYDROCHLORIDE 5 MILLIGRAM(S): 5 TABLET ORAL at 21:37

## 2025-04-21 RX ADMIN — TRAMADOL HYDROCHLORIDE 50 MILLIGRAM(S): 50 TABLET, FILM COATED ORAL at 22:35

## 2025-04-21 RX ADMIN — INSULIN LISPRO 6: 100 INJECTION, SOLUTION INTRAVENOUS; SUBCUTANEOUS at 21:37

## 2025-04-21 NOTE — CONSULT NOTE ADULT - SUBJECTIVE AND OBJECTIVE BOX
Patient is a 74y old  Female who presents with a chief complaint of Hyperglycemia (21 Apr 2025 15:29)      Reason For Consult: dm2 uncontrolled    HPI:  74-year-old white female  presently residing in rehab center with history of diabetes mellitus on insulin hypertension and hyperlipidemia who had developed vascular ulcers/gangrene left lower extremity a and he was evaluated as outpatient by vascular surgeon scheduled for surgery today and because of glucose being out of control was sent to the emergency department here at [place] for further evaluation care treatment and management as well as stabilization of patient from a metabolic and glucose standpoint.  Patient states that on a good day her sugar runs 180-200.  No recent fever chills nausea vomiting or diarrhea.  ED COURSE:  Vitals: T 97.7  F , 90 HR  ,  /47 , RR 16 , SpO2  100% on RA  Labs significant for: wbc 15.92, ,   Imaging: No acute infilitrate on chest x ray   EKG: NSR, qtc 430  Pt received: 10u admelog, zosyn 3.375 g, vanc 1g, LR 1L   (21 Apr 2025 12:55)      PAST MEDICAL & SURGICAL HISTORY:  DM (diabetes mellitus)      HTN (hypertension)      No significant past surgical history          FAMILY HISTORY:        Social History:    MEDICATIONS  (STANDING):  amLODIPine   Tablet 10 milliGRAM(s) Oral daily  apixaban 5 milliGRAM(s) Oral every 12 hours  aspirin  chewable 81 milliGRAM(s) Oral daily  atorvastatin 20 milliGRAM(s) Oral at bedtime  dextrose 5%. 1000 milliLiter(s) (50 mL/Hr) IV Continuous <Continuous>  dextrose 5%. 1000 milliLiter(s) (100 mL/Hr) IV Continuous <Continuous>  dextrose 50% Injectable 25 Gram(s) IV Push once  dextrose 50% Injectable 12.5 Gram(s) IV Push once  dextrose 50% Injectable 25 Gram(s) IV Push once  donepezil 5 milliGRAM(s) Oral at bedtime  furosemide    Tablet 20 milliGRAM(s) Oral daily  glucagon  Injectable 1 milliGRAM(s) IntraMuscular once  insulin lispro (ADMELOG) corrective regimen sliding scale   SubCutaneous three times a day before meals  insulin lispro (ADMELOG) corrective regimen sliding scale   SubCutaneous at bedtime  lisinopril 40 milliGRAM(s) Oral daily  piperacillin/tazobactam IVPB.. 3.375 Gram(s) IV Intermittent every 8 hours  sodium chloride 0.9%. 1000 milliLiter(s) (55 mL/Hr) IV Continuous <Continuous>    MEDICATIONS  (PRN):  acetaminophen     Tablet .. 650 milliGRAM(s) Oral every 6 hours PRN Temp greater or equal to 38C (100.4F), Mild Pain (1 - 3)  dextrose Oral Gel 15 Gram(s) Oral once PRN Blood Glucose LESS THAN 70 milliGRAM(s)/deciliter  melatonin 3 milliGRAM(s) Oral at bedtime PRN Insomnia  ondansetron Injectable 4 milliGRAM(s) IV Push every 6 hours PRN Nausea and/or Vomiting        T(C): 36.9 (04-21-25 @ 20:13), Max: 36.9 (04-21-25 @ 20:13)  HR: 97 (04-21-25 @ 20:13) (83 - 97)  BP: 127/60 (04-21-25 @ 20:13) (119/65 - 129/65)  RR: 18 (04-21-25 @ 20:13) (16 - 18)  SpO2: 99% (04-21-25 @ 20:13) (99% - 100%)  Wt(kg): --    PHYSICAL EXAM:  GENERAL: NAD, well-groomed, well-developed  HEAD:  Atraumatic, Normocephalic  NECK: Supple, No JVD, Normal thyroid  CHEST/LUNG: Clear to percussion bilaterally; No rales, rhonchi, wheezing, or rubs  HEART: Regular rate and rhythm; No murmurs, rubs, or gallops  ABDOMEN: Soft, Nontender, Nondistended; Bowel sounds present  EXTREMITIES:  le foot dsg    CAPILLARY BLOOD GLUCOSE      POCT Blood Glucose.: 353 mg/dL (21 Apr 2025 21:22)  POCT Blood Glucose.: 385 mg/dL (21 Apr 2025 17:15)  POCT Blood Glucose.: 483 mg/dL (21 Apr 2025 13:30)  POCT Blood Glucose.: 495 mg/dL (21 Apr 2025 13:24)  POCT Blood Glucose.: 522 mg/dL (21 Apr 2025 12:44)  POCT Blood Glucose.: 556 mg/dL (21 Apr 2025 12:42)  POCT Blood Glucose.: 510 mg/dL (21 Apr 2025 11:28)  POCT Blood Glucose.: 526 mg/dL (21 Apr 2025 11:24)                            10.4   15.92 )-----------( 415      ( 21 Apr 2025 09:15 )             32.7       CMP:  04-21 @ 09:15  SGPT 12  Albumin 2.6   Alk Phos 114   Anion Gap 17   SGOT 27   Total Bili 0.9   BUN 21   Calcium Total 9.8   CO2 21   Chloride 100   Creatinine 0.76   eGFR if AA --   eGFR if non AA --   Glucose 410   Potassium 5.0   Protein 7.0   Sodium 138      Thyroid Function Tests:      Diabetes Tests:       Radiology:

## 2025-04-21 NOTE — H&P ADULT - NSHPPHYSICALEXAM_GEN_ALL_CORE
Vital Signs Last 24 Hrs  T(C): 36.5 (21 Apr 2025 11:30), Max: 36.5 (21 Apr 2025 08:49)  T(F): 97.7 (21 Apr 2025 11:30), Max: 97.7 (21 Apr 2025 08:49)  HR: 90 (21 Apr 2025 11:30) (86 - 90)  BP: 122/47 (21 Apr 2025 11:30) (119/65 - 122/47)  BP(mean): --  RR: 16 (21 Apr 2025 11:30) (16 - 17)  SpO2: 100% (21 Apr 2025 11:30) (100% - 100%)    Parameters below as of 21 Apr 2025 11:30  Patient On (Oxygen Delivery Method): room air        CONSTITUTIONAL: awake, alert, generalized malice, feeble appearing  HEENT: Atraumatic normocephalic. Moist mucous membranes.  No conjunctival injection.  RESP: No respiratory distress; CTA b/l, no WRR  CV: RRR, +S1S2, no MRG  GI: Bowel sounds present. Soft, nontender, nondistended, no rebound or guarding  MSK: Moving all four extremities spontaneously. No peripheral edema. No calf tenderness.  SKIN: Warm and dry. LLE open wound near malleolus extensor surface of the pedis.   NEURO: AOx3, answering questions and following commands appropriately  PSYCH: Mood and affect appropriate, recent memory intact

## 2025-04-21 NOTE — H&P ADULT - NSHPREVIEWOFSYSTEMS_GEN_ALL_CORE
REVIEW OF SYSTEMS:  CONSTITUTIONAL: No fever, chills or fatigue.  HENMT: No HA, dizziness, rhinorrhea  RESPIRATORY: No cough or shortness of breath.  CARDIOVASCULAR: No chest pain, palpitations.  GASTROINTESTINAL: No abdominal pain. No nausea or vomiting; No diarrhea or constipation. No blood per rectum.  GENITOURINARY: No dysuria, changes in frequency, hematuria, or incontinence  NEUROLOGICAL: Baseline strength. Sensation intact bilaterally.  SKIN: No itching, rashes  MUSCULOSKELETAL: No joint pain or swelling; No muscle, back, or extremity pain REVIEW OF SYSTEMS:  CONSTITUTIONAL: No fever, chills or fatigue.  HENMT: No HA, dizziness, rhinorrhea  RESPIRATORY: No cough or shortness of breath.  CARDIOVASCULAR: No chest pain, palpitations.  GASTROINTESTINAL: No abdominal pain. No nausea or vomiting; No diarrhea or constipation. No blood per rectum.  GENITOURINARY: No dysuria, changes in frequency, hematuria, or incontinence  NEUROLOGICAL: Baseline strength. Sensation intact bilaterally.  SKIN: No itching, rashes  MUSCULOSKELETAL: RLE tenderness need ankle due to open wound

## 2025-04-21 NOTE — CHART NOTE - NSCHARTNOTEFT_GEN_A_CORE
called by RN stating pt having LLE severe chronic pain 10/10.     -chart reviewed pt w/ vascular ulcers/gangrene left lower extremity and PAD   -has not received any pain meds   -tramadol 50mg x1 ordered   -Will continue to monitor   -RN to notify w/ any changes called by RN stating pt having LLE severe chronic pain 10/10.     -chart reviewed pt w/ vascular ulcers/gangrene left lower extremity and PAD   -has not received any pain meds   -tramadol 50mg x1 ordered   -Will continue to monitor   -RN to notify w/ any changes      Addendum: called by RN stating pt's son called stating his father tested positive for COVID and came to visit pt today.   -will get COVID swab called by RN stating pt having LLE severe chronic pain 10/10.     -chart reviewed pt w/ vascular ulcers/gangrene left lower extremity and PAD   -has not received any pain meds   -tramadol 50mg x1 ordered   -Will continue to monitor   -RN to notify w/ any changes      Addendum: called by RN stating pt's son called stating his father tested positive for COVID and came to visit pt today.   -will get COVID swab   -COVID positive put on isolation precautions   -pt is asymptomatic not requiring additional oxygen will hold off on any further meds   -ID following   -day team to reassess

## 2025-04-21 NOTE — H&P ADULT - PROBLEM SELECTOR PLAN 2
LE duplex with H/O DVT(previously on Eliquis)   Consult wound care, offload/turn and position  s/p vanc and zosyn in ED, c/w   consult ID, f/u   Radford exchange and urine culture  f/u BCx  Vascular Dr. Burden following  Will reschedule LLE once medically optimized  Pt is at high risk for limb loss due to large area of tissue loss and severe PAD

## 2025-04-21 NOTE — ED PROVIDER NOTE - PRINCIPAL DIAGNOSIS
Pt with fever and coughing since yesterday.   Pt given tylenol at 5 am.  Throwing up when coughing Poorly controlled diabetes mellitus

## 2025-04-21 NOTE — ED PROVIDER NOTE - OBJECTIVE STATEMENT
Patient is a 74-year-old white female  presently residing in rehab center with history of diabetes mellitus on insulin hypertension and hyperlipidemia who had developed vascular ulcers/gangrene left lower extremity a and he was evaluated as outpatient by vascular surgeon scheduled for surgery today and because of glucose being out of control was sent to the emergency department here at [place] for further evaluation care treatment and management as well as stabilization of patient from a metabolic and glucose standpoint.  Patient states that on a good day her sugar runs 180-200.  No recent fever chills nausea vomiting or diarrhea.

## 2025-04-21 NOTE — ED ADULT NURSE NOTE - OBJECTIVE STATEMENT
patient to ED  sent from [facility]op [facility] for LLE angiogram but pt cannot go to surgery due to hyperglycemia. per family, pt acting at her baseline. wounds to left LLE open to air at this time. sacral ulcer cleaned and dressed. no acute pain/discomort. has chronic dorado for neurogenic bladder. replaced at bedside. tolerated well

## 2025-04-21 NOTE — ED PROVIDER NOTE - DIFFERENTIAL DIAGNOSIS
Diabetes out-of-control rule out labile hyperglycemia most likely secondary to infectious process rule out osteo rule out infected ulcer. Differential Diagnosis

## 2025-04-21 NOTE — CARE COORDINATION ASSESSMENT. - NSCAREPROVIDERS_GEN_ALL_CORE_FT
CARE PROVIDERS:  Accepting Physician: Jose Guadalupe King  Access Services: Rodrigo Mike  Access Services: Bonnie Wang  Administration: Louisa Sexton  Administration: Jose Dlegado  Admitting: Jose Guadalupe King  Attending: Jose Guadalupe King  Consultant: Gregory Huitron  Consultant: Kerri Mccain  Consultant: Han Gann  Consultant: Dakotah Reddy  Consultant: Alia Byers  Consultant: Valerie Silva  ED Attending: Farrukh Mccord ED Nurse: Radha Payan  Nurse: Radha Payan  Nurse: Gianni Marks  Nurse: Jessica Land  Ordered: Doctor, Unknown  Ordered: ADM, User  Override: Jessica Land  Primary Team: Chio Coker  : Chery Gastelum// Supp. Assoc.: Mari Esparza   CARE PROVIDERS:  Accepting Physician: Jose Guadalupe King  Access Services: Nadeem Resendiz  Administration: Navid Plummer  Administration: Jose Maria Suarez  Admitting: Jose Guadalupe King  Attending: Navid Plummer  Cardiology Technician: Agustin Quesada  Consultant: Khanh Mead  Consultant: Alia Byers  Consultant: Malik Ayala  Consultant: Han Gann  Consultant: Mohit Merida  Consultant: Yonny Skinner  Consultant: Дмитрий Interiano  Consultant: Perlman, Craig  Consultant: Gregory Huitron  Consultant: Nikki Jose  Consultant: Irma Ch  Consultant: Vinicius Melvin  Consultant: Sara Hamm  Covering Team: Cassi Borja  Covering Team: Navid Dubois  Covering Team: Navid Plummer  ED Attending: Farrukh Mccord ED Nurse: Radha Payan  HIM/Billing & Coding: Sophia Bianchi  Infection Control: Jean Sharp  Intern: Kia Marrufo  Intern: Hilda Hernandez  Nurse: Dionna Ross  Nurse: Lacie Butler  Nurse: Irma Lerma  Nurse: Cristiane Santana  Ordered: Yonny Skinner  Ordered: Doctor, Unknown  Ordered: ADM, User  PCA/Nursing Assistant: Harini Bourne  Primary Team: Columba Chiu  Primary Team: Moncho Del Toro  Primary Team: Chio Coker  : Chelsea Damico  : Chery Gastelum  : Ashley Gil  Team: PL[facility]  Hospitalists, Team

## 2025-04-21 NOTE — H&P ADULT - HISTORY OF PRESENT ILLNESS
74-year-old white female  presently residing in rehab center with history of diabetes mellitus on insulin hypertension and hyperlipidemia who had developed vascular ulcers/gangrene left lower extremity a and he was evaluated as outpatient by vascular surgeon scheduled for surgery today and because of glucose being out of control was sent to the emergency department here at [place] for further evaluation care treatment and management as well as stabilization of patient from a metabolic and glucose standpoint.  Patient states that on a good day her sugar runs 180-200.  No recent fever chills nausea vomiting or diarrhea.   74-year-old white female  presently residing in rehab center with history of diabetes mellitus on insulin hypertension and hyperlipidemia who had developed vascular ulcers/gangrene left lower extremity a and he was evaluated as outpatient by vascular surgeon scheduled for surgery today and because of glucose being out of control was sent to the emergency department here at [place] for further evaluation care treatment and management as well as stabilization of patient from a metabolic and glucose standpoint.  Patient states that on a good day her sugar runs 180-200.  No recent fever chills nausea vomiting or diarrhea.  ED COURSE:  Vitals: T 97.7  F , 90 HR  ,  /47 , RR 16 , SpO2  100% on RA  Labs significant for: wbc 15.92, ,   Imaging: No acute infilitrate on chest x ray   EKG: NSR, qtc 430  Pt received: 10u admelog, zosyn 3.375 g, vanc 1g, LR 1L

## 2025-04-21 NOTE — ED ADULT NURSE NOTE - NSFALLHARMRISKINTERV_ED_ALL_ED

## 2025-04-21 NOTE — CARE COORDINATION ASSESSMENT. - OTHER PERTINENT REFERRAL INFORMATION
Sw met with pt and spouse at bedside. Pt is  A & O x2 and is a poor historian. Pts family provided answers to most of the questions. Sw introduced self and role and pts family expressed understanding. PT was at [facility] from 2/28-3/7 and was discharged to [facility] for [facility]. Pt came to [facility] for pre op services but was admitted due to uncontrolled diabetes. Plan is for pt to return to [facility] if family is in agreement. PCP: [phone] Sw spoke with pts spouse due to pt being a poor historian. Pt is  A & O x2 and is a poor historian. Pts family provided answers to most of the questions. Sw introduced self and role and pts family expressed understanding. PT was at [facility] from 2/28-3/7 and was discharged to [facility] for [facility]. Pt came to [facility] for pre op services but was admitted due to uncontrolled diabetes. Plan is for pt to return to [facility] if family is in agreement. PCP: [phone]

## 2025-04-21 NOTE — PATIENT PROFILE ADULT - FALL HARM RISK - RISK INTERVENTIONS

## 2025-04-21 NOTE — H&P ADULT - ATTENDING COMMENTS
Patient is seen and examined with the resident, Patient presented with elevated blood sugar. She was supposed to have LLE vascular procedure, Procedure cancelled due to uncontrolled BS. Started on Insulin regimen, also follow Endocrine consult to optimize treatment. Started on IV antibiotics for LLE cellulitis and gangrene, follow ID and Vascular surgery consult.

## 2025-04-21 NOTE — ED PROVIDER NOTE - PHYSICAL EXAMINATION
Examination reveals a somewhat frail elderly white female who appears her stated age normocephalic atraumatic lungs are clear heart regular rate and rhythm without any murmurs rubs gallops abdomen is completely soft nontender positive bowel sounds extremities reveal gangrenous type of dry necrotic wound distal aspect of the left distal calf lateral aspect near distal left fibula as well as similar wound left foot dorsum also lateral aspect as well as left heel.  Neurologically patient is awake and alert no focal neurologic deficits.

## 2025-04-21 NOTE — CONSULT NOTE ADULT - SUBJECTIVE AND OBJECTIVE BOX
[facility]  INFECTIOUS DISEASES   [address] [place] [address]  Tel: [phone]     Fax: [phone]  ========================================================  MD Arthur Bell Michelle, MD Shah, Kaushal, MD Sunjit, Jaspal, MD Sehrish Shahid, MD   ========================================================    MRN-[id number]  TERESA M[facility]UGH     CC: Patient is a 74y old  Female who presents with a chief complaint of Hyperglycemia (21 Apr 2025 12:55)    HPI:  74-year-old white female  presently residing in rehab center with history of diabetes mellitus on insulin hypertension and hyperlipidemia who had developed vascular ulcers/gangrene left lower extremity a and he was evaluated as outpatient by vascular surgeon scheduled for surgery today and because of glucose being out of control was sent to the emergency department here at [facility] for further evaluation care treatment and management as well as stabilization of patient from a metabolic and glucose standpoint.  Patient states that on a good day her sugar runs 180-200.  No recent fever chills nausea vomiting or diarrhea.  ED COURSE:  Vitals: T 97.7  F , 90 HR  ,  /47 , RR 16 , SpO2  100% on RA  Labs significant for: wbc 15.92, ,   Imaging: No acute infilitrate on chest x ray   EKG: NSR, qtc 430  Pt received: 10u admelog, zosyn 3.375 g, vanc 1g, LR 1L   (21 Apr 2025 12:55)    PAST MEDICAL & SURGICAL HISTORY:  DM (diabetes mellitus)  HTN (hypertension)  No significant past surgical history    Social Hx: No current smoking, EtOH or drugs     FAMILY HISTORY:  Noncontributory     Allergies  No Known Allergies    MEDICATIONS  (STANDING):  amLODIPine   Tablet 10 milliGRAM(s) Oral daily  apixaban 5 milliGRAM(s) Oral every 12 hours  aspirin  chewable 81 milliGRAM(s) Oral daily  atorvastatin 20 milliGRAM(s) Oral at bedtime  dextrose 5%. 1000 milliLiter(s) (50 mL/Hr) IV Continuous <Continuous>  dextrose 5%. 1000 milliLiter(s) (100 mL/Hr) IV Continuous <Continuous>  dextrose 50% Injectable 25 Gram(s) IV Push once  dextrose 50% Injectable 12.5 Gram(s) IV Push once  dextrose 50% Injectable 25 Gram(s) IV Push once  donepezil 5 milliGRAM(s) Oral at bedtime  furosemide    Tablet 20 milliGRAM(s) Oral daily  glucagon  Injectable 1 milliGRAM(s) IntraMuscular once  insulin lispro (ADMELOG) corrective regimen sliding scale   SubCutaneous three times a day before meals  insulin lispro (ADMELOG) corrective regimen sliding scale   SubCutaneous at bedtime  insulin lispro Injectable (ADMELOG) 5 Unit(s) SubCutaneous once  lisinopril 40 milliGRAM(s) Oral daily  piperacillin/tazobactam IVPB.. 3.375 Gram(s) IV Intermittent every 8 hours  sodium chloride 0.9%. 1000 milliLiter(s) (55 mL/Hr) IV Continuous <Continuous>    REVIEW OF SYSTEMS:  Unable, states feels fine    Physical Exam:  Vital Signs Last 24 Hrs  T(C): 36.6 (21 Apr 2025 14:51), Max: 36.6 (21 Apr 2025 14:51)  T(F): 97.9 (21 Apr 2025 14:51), Max: 97.9 (21 Apr 2025 14:51)  HR: 89 (21 Apr 2025 14:51) (86 - 90)  BP: 122/50 (21 Apr 2025 14:51) (119/65 - 122/50)  BP(mean): 70 (21 Apr 2025 14:51) (70 - 70)  RR: 18 (21 Apr 2025 14:51) (16 - 18)  SpO2: 100% (21 Apr 2025 14:51) (100% - 100%)  Parameters below as of 21 Apr 2025 14:51  Patient On (Oxygen Delivery Method): room air  Height (cm): 160 (04-21 @ 08:49)  Weight (kg): 56.7 (04-21 @ 08:49)  BMI (kg/m2): 22.1 (04-21 @ 08:49)  BSA (m2): 1.58 (04-21 @ 08:49)  GEN: NAD  HEENT: normocephalic and atraumatic. EOMI. PERRL.    NECK: Supple.  No lymphadenopathy   LUNGS: Clear to auscultation.  HEART: Regular rate and rhythm   ABDOMEN: Soft, nontender, and nondistended.   EXTREMITIES: Without edema.  NEUROLOGIC: grossly intact.  PSYCHIATRIC: Appropriate affect .  SKIN: LLE with 2 areas of gangrenous ulcers, one on ankle on dorsal side and   larger one in posterolateral side of leg, some discharge under dry eschar      Labs:  04-21    138  |  100  |  21  ----------------------------<  410[H]  5.0   |  21[L]  |  0.76    Ca    9.8      21 Apr 2025 09:15  Mg     2.2     04-21    TPro  7.0  /  Alb  2.6[L]  /  TBili  0.9  /  DBili  x   /  AST  27  /  ALT  12  /  AlkPhos  114  04-21                        10.4   15.92 )-----------( 415      ( 21 Apr 2025 09:15 )             32.7     Urinalysis Basic - ( 21 Apr 2025 09:15 )    Color: x / Appearance: x / SG: x / pH: x  Gluc: 410 mg/dL / Ketone: x  / Bili: x / Urobili: x   Blood: x / Protein: x / Nitrite: x   Leuk Esterase: x / RBC: x / WBC x   Sq Epi: x / Non Sq Epi: x / Bacteria: x    LIVER FUNCTIONS - ( 21 Apr 2025 09:15 )  Alb: 2.6 g/dL / Pro: 7.0 g/dL / ALK PHOS: 114 U/L / ALT: 12 U/L / AST: 27 U/L / GGT: x           Sedimentation Rate, Erythrocyte: 70 mm/hr (04-21-25 @ 09:15)    All imaging and other data have been reviewed.  < from: VA Duplex Lower Ext Vein Scan, Bilat (04.21.25 @ 14:14) >  IMPRESSION:  No evidence of deep venous thrombosis in the visualized deep veins of   either lower extremity noted above.    < from: Xray Chest 1 View- PORTABLE-Urgent (04.21.25 @ 09:33) >  IMPRESSION:  No acute infiltrate.      Assessment and Plan:   73yo woman with PMH of HTN and DM2, presently residing in rehab center had developed vascular ulcers/gangrene left lower extremity a and she was evaluated as outpatient by vascular surgeon scheduled for surgery today and because of glucose being out of control was sent to the emergency department here at [facility] for further evaluation, treatment and management as well as stabilization of patient from a metabolic and glucose standpoint. No recent fever chills nausea vomiting or diarrhea.  She has leukocytosis of 15k today in ED but no fever.     # Vascular ulcers in LLE  # Uncontrolled Diabetes mellitus     - Will follow Cultures   - I send a wound (lateral wound had some discharge under eschar)  - Monitor ESR and CRP   - Monitor WBC   - Vascular and wound care consults   - Agree with zosyn for now  - Can hold vancomycin  - MRSA PCR     Thank you for courtesy of this consult.     Will follow.  Discussed with the primary team.     Gregory Huitron MD  Division of Infectious Diseases   Please call ID service at [phone] with any question.    75 minutes spent on total encounter assessing patient, examination, chart review, counseling and coordinating care by the attending physician/nurse/care manager.

## 2025-04-21 NOTE — CONSULT NOTE ADULT - SUBJECTIVE AND OBJECTIVE BOX
Vascular Attending:  Dr Burden      HPI: 74-year-old white female with PMH HTN, HLD, diabetes mellitus on insulin and PAD S/P multiple LLE angios with L SFA and pop stent by Dr De La Cruz approx 3 years ago.  She had developed vascular ulcers/gangrene left lower extremity in March 2025 and underwent angio in April 2025 in which the lesion was unable to be crossed. She was seen and evaluated by Dr Burden and underwent CTA which identified occluded distal SFA and pop with instent occlusion and was scheduled for LLE angio. However, because of poor glucose control and elevated WBC she was sent to the emergency department here at [facility] for further evaluation care treatment and management as well as stabilization of patient from a metabolic and glucose standpoint.  Patient states that on a good day her sugar runs 180-200. She denies any CP, SOB, N/V, fever or chills. She has baseline left sided weakness from her MS      PAST MEDICAL & SURGICAL HISTORY:  DM (diabetes mellitus)  HTN (hypertension)  MS(no longer on any disease modifiers)  HLD  PAD  Neurogenic bladder  DVT  No significant past surgical history    REVIEW OF SYSTEMS- as above  General:	    Skin/Breast:  	  Ophthalmologic:  	  ENMT:	    Respiratory and Thorax:  	  Cardiovascular:	    Gastrointestinal:	    Genitourinary:	    Musculoskeletal:	    Neurological:	    Psychiatric:	    Hematology/Lymphatics:	    Endocrine:	    Allergic/Immunologic:	    MEDICATIONS  (STANDING):  insulin lispro Injectable (ADMELOG) 10 Unit(s) SubCutaneous Once    MEDICATIONS  (PRN):      Allergies  No Known Allergies    SOCIAL HISTORY: ; currently resides at Nursing Facility. Has not ambulated in over 1 month. Non smoker, no ETOH      Vital Signs Last 24 Hrs  T(C): 36.5 (21 Apr 2025 08:49), Max: 36.5 (21 Apr 2025 08:49)  T(F): 97.7 (21 Apr 2025 08:49), Max: 97.7 (21 Apr 2025 08:49)  HR: 86 (21 Apr 2025 08:49) (86 - 86)  BP: 119/65 (21 Apr 2025 08:49) (119/65 - 119/65)  BP(mean): --  RR: 17 (21 Apr 2025 08:49) (17 - 17)  SpO2: 100% (21 Apr 2025 08:49) (100% - 100%)    Parameters below as of 21 Apr 2025 08:49  Patient On (Oxygen Delivery Method): nasal cannula  O2 Flow (L/min): 2      PHYSICAL EXAM:  Constitutional: Thin, elderly F in NAD  Eyes: EOMI  ENMT: WNL  Neck: No JVD  Respiratory: essentially CTA  Cardiovascular: normal S1, S2  Gastrointestinal: soft, ND, NT, + BS  Genitourinary: Radford-leg bag with clear beulah urine  Extremities: LLE with large lat calf necrotic eschar without any periwound erythema or fluctuance. Large distal calf/dorsal foot necrotic eschar without any erythema. Left lat malleolar dry necrotic eschar with periwound erythema without fluctuance. L heel dry necrotic eschar. L foot sensory and motor abnormalities at baseline with bilat drop foot  Neurological: A&O x 3  Pulses:   Right:                                                                          Left:  FEM [x]2+ [ ]1+ [ ]doppler                                             FEM [x ]2+ [ ]1+ [ ]doppler    POP [ ]2+ [ x]1+ [ ]doppler                                             POP [ ]2+ [ ]1+ [ ]doppler    DP [x ]2+ [ ]1+ [ ]doppler                                                DP [ ]2+ [ ]1+ [ ]doppler  PT[ ]2+ [x ]1+ [ ]doppler                                                  PT [ ]2+ [ ]1+ [x ]doppler      LABS:                        10.4   15.92 )-----------( 415      ( 21 Apr 2025 09:15 )             32.7     04-21    138  |  100  |  21  ----------------------------<  410[H]  5.0   |  21[L]  |  0.76    Ca    9.8      21 Apr 2025 09:15  Mg     2.2     04-21    TPro  7.0  /  Alb  2.6[L]  /  TBili  0.9  /  DBili  x   /  AST  27  /  ALT  12  /  AlkPhos  114  04-21      Urinalysis Basic - ( 21 Apr 2025 09:15 )    Color: x / Appearance: x / SG: x / pH: x  Gluc: 410 mg/dL / Ketone: x  / Bili: x / Urobili: x   Blood: x / Protein: x / Nitrite: x   Leuk Esterase: x / RBC: x / WBC x   Sq Epi: x / Non Sq Epi: x / Bacteria: x        RADIOLOGY & ADDITIONAL STUDIES    EXAM: [id number] - CT ANGIO ABD AOR W RUN(W)AW IC  - ORDERED BY: SUJATHA BURDEN      PROCEDURE DATE:  03/27/2025        INTERPRETATION:  CLINICAL INFORMATION: Peripheral arterial disease, left lower extremity ulcers    COMPARISON: CT abdomen pelvis 12/23/2022    CONTRAST/COMPLICATIONS:  IV Contrast: Omnipaque 350  125 cc administered   25 cc discarded  Oral Contrast: NONE    CT ANGIOGRAM ABDOMEN, PELVIS, AND LOWER EXTREMITIES:    PROCEDURE:  Initially, nonenhanced CT was obtained through the calves. Then, following the rapid administration of intravenous contrast, CT angiography was performed through the abdomen, pelvis, and lower extremities down to the toes.  Delayed images were also obtained. Sagittal and coronal reformats as well as 3D reconstructions were performed.    FINDINGS:    CENTRAL ARTERIAL SYSTEM:    Abdominal aorta is nonaneurysmal multifocal calcified plaque. Bilateral common/external/internal iliac arteries demonstrate minimal to mild category plaque.    Celiac trunk, SMA, and left renal artery demonstrate minimal to mild category plaque. Moderate to severe category stenosis of proximal right renal artery. STEPHEN is too small to characterize at the origin, subsequently opacified with contrast. Central vein patency is not assessed on this study due to timing of contrast.    RIGHT LOWER EXTREMITY:    Right common femoral, deep femoral, superficial femoral, and popliteal arteries demonstrate multifocal plaque with up to mild category stenosis. Branch vessels of right deep femoral artery are difficult to assess due to small vessel caliber. Diffusely calcified right trifurcation arteries are difficult to assess due to small vessel caliber. Significant right trifurcation artery disease cannot be excluded.    LEFT LOWER EXTREMITY:    Left common femoral and deep femoral arteries with mild category plaque. Left proximal SFA mild stenosis secondary to calcified and noncalcified plaque. Left mid SFA moderate to severe category disease secondary to plaque. Left distal SFA and popliteal artery are occluded, including stent. Diffusely calcified left trifurcation arteries are incompletely assessed due to small vessel caliber. Significant left trifurcation artery disease cannot be excluded.    ADDITIONAL FINDINGS:    Trace bilateral pleural effusions and bibasilar atelectasis. Mitral annular calcifications. Left breast coarse calcification.    Solid organ evaluation is limited due to significant streak artifact. Liver size within normal limits. Small hypervascular foci in the liver may represent shunts, FNHs, or flash filling hemangiomas, incompletely characterized on single phase study. No biliary distention. Unremarkable CT appearance of the gallbladder and pancreas. Spleen size is within normal limits. Previously noted cystic lesion of the spleen from 2022 is decreased in size and poorly assessed on this study due to streak artifact. Adrenals are unremarkable. No hydronephrosis of the kidneys. Small bilateral renal hypodensities are too small to characterize, particularly given streak artifact. Minimally distended urinary bladder with in situ Radford catheter. Droplets of air within the bladder likely due to presence of the Radford catheter. Correlate clinically. Uterus and adnexa are suboptimally characterized on CT.    Stomach is underdistended. No small bowel distention. Appendix is nonobstructed. Mild stool burden of the colon limits evaluation of the colonic mucosa. No ascites. No enlarged lymph nodes by CT size criteria. Left gluteal coarse calcifications. Degenerative changes of the bones including multilevel spinal spondylosis. Central canal and neural foramina are not adequately assessed on this study. Osseous demineralization is noted.    IMPRESSION:    VASCULAR:    Central: No significant aortoiliac stenosis. Moderate to severe category stenosis of proximal right renal artery.    Right Leg: Multifocal mild category right femoropopliteal arterial plaque. Diffusely calcified right trifurcation arteries are difficult to assess due to small vessel caliber. Significant right trifurcation artery disease cannot be excluded.    Left Leg: Left mid SFA moderate to severe category disease secondary to plaque. Left distal SFA and popliteal artery are occluded, including stent. Diffusely calcified left trifurcation arteries are incompletely assessed due to small vessel caliber. Significant left trifurcation artery disease cannot be excluded.    NON-VASCULAR:  -Trace bilateral pleural effusions and bibasilar atelectasis.  -Minimally distended urinary bladder with in situ Radford catheter. Droplets of air within the bladder likely due to presence of the Radford catheter. Correlate clinically.    --- End of Report ---

## 2025-04-21 NOTE — CONSULT NOTE ADULT - NS ATTEND AMEND GEN_ALL_CORE FT
Patient seen 421 as well as 423.  She is less alert today compared to day of admission.  Her clinical status seems to have declined within the past month.  Her wounds on the distal calf are relatively new and more extensive than before.  Heel gangrene is quite extensive as well.  According to the  she stood 2 days prior to admission I am uncertain of this.  I do not believe that she is going to benefit from a peripheral angiogram.  She has in-stent occlusions multilevel disease even if I can increase the blood flow doubt she will heal his extensive wounds or even ambulate at this time.  I will need to speak with the son I have already spoken to her

## 2025-04-21 NOTE — ED ADULT NURSE REASSESSMENT NOTE - NS ED NURSE REASSESS COMMENT FT1
Pt report received from previous RN. Pt contact made. Pt c/o "feeling funny" at this time. BGM checked twice as per policy.  Pt is A&Ox3 and speaking in full sentences. Pt is resting comfortably in bed, VSS, safety precautions maintained. Awaiting bed/ meds  at this time.

## 2025-04-21 NOTE — H&P ADULT - PROBLEM SELECTOR PLAN 1
Presented hyperglycemic, glucose in the 500s  s/p 10 u insulin in ED, s/p 1L LR in ED, glucose DT  on IVF  on LDISS  Endo consult Dr. Campbell  soft bite sized CC diet

## 2025-04-21 NOTE — H&P ADULT - NSHPSOCIALHISTORY_GEN_ALL_CORE
Tobacco: na  EtOH:  na  Recreational drug use: ndu  Lives with:  in [place]  Ambulates: w/ walker  ADLs: needs assistance General Sunscreen Counseling: I recommended a broad spectrum sunscreen with a SPF of 30 or higher.  I explained that SPF 30 sunscreens block approximately 97 percent of the sun's harmful rays.  Sunscreens should be applied at least 15 minutes prior to expected sun exposure and then every 2 hours after that as long as sun exposure continues. If swimming or exercising sunscreen should be reapplied every 45 minutes to an hour after getting wet or sweating.  One ounce, or the equivalent of a shot glass full of sunscreen, is adequate to protect the skin not covered by a bathing suit. I also recommended a lip balm with a sunscreen as well. Sun protective clothing can be used in lieu of sunscreen but must be worn the entire time you are exposed to the sun's rays. Detail Level: Detailed

## 2025-04-21 NOTE — ED PROVIDER NOTE - CLINICAL SUMMARY MEDICAL DECISION MAKING FREE TEXT BOX
Elderly female with nonhealing gangrenous ulcer to left lower extremity with labile glucose requiring thorough evaluation to be performed in an independent manner followed by labs cultures antibiotics and insulin for tighter glucose control.

## 2025-04-21 NOTE — H&P ADULT - ASSESSMENT
74-year-old white female  presently residing in rehab center with history of diabetes mellitus on insulin hypertension and hyperlipidemia who had developed vascular ulcers/gangrene left lower extremity prsenting with hyperglycemic with plan for vascular intervention in LLE to aid in wound healing.

## 2025-04-22 DIAGNOSIS — Z71.89 OTHER SPECIFIED COUNSELING: ICD-10-CM

## 2025-04-22 DIAGNOSIS — L89.152 PRESSURE ULCER OF SACRAL REGION, STAGE 2: ICD-10-CM

## 2025-04-22 DIAGNOSIS — E10.65 TYPE 1 DIABETES MELLITUS WITH HYPERGLYCEMIA: ICD-10-CM

## 2025-04-22 DIAGNOSIS — Z51.5 ENCOUNTER FOR PALLIATIVE CARE: ICD-10-CM

## 2025-04-22 LAB
A1C WITH ESTIMATED AVERAGE GLUCOSE RESULT: 7.8 % — HIGH (ref 4–5.6)
ACETONE SERPL-MCNC: ABNORMAL
ALBUMIN SERPL ELPH-MCNC: 2.2 G/DL — LOW (ref 3.3–5)
ALP SERPL-CCNC: 99 U/L — SIGNIFICANT CHANGE UP (ref 40–120)
ALT FLD-CCNC: 10 U/L — LOW (ref 12–78)
ANION GAP SERPL CALC-SCNC: 17 MMOL/L — SIGNIFICANT CHANGE UP (ref 5–17)
APPEARANCE UR: ABNORMAL
AST SERPL-CCNC: 12 U/L — LOW (ref 15–37)
BASOPHILS # BLD AUTO: 0.07 K/UL — SIGNIFICANT CHANGE UP (ref 0–0.2)
BASOPHILS NFR BLD AUTO: 0.5 % — SIGNIFICANT CHANGE UP (ref 0–2)
BILIRUB SERPL-MCNC: 0.5 MG/DL — SIGNIFICANT CHANGE UP (ref 0.2–1.2)
BILIRUB UR-MCNC: NEGATIVE — SIGNIFICANT CHANGE UP
BUN SERPL-MCNC: 17 MG/DL — SIGNIFICANT CHANGE UP (ref 7–23)
CALCIUM SERPL-MCNC: 9.2 MG/DL — SIGNIFICANT CHANGE UP (ref 8.5–10.1)
CHLORIDE SERPL-SCNC: 102 MMOL/L — SIGNIFICANT CHANGE UP (ref 96–108)
CO2 SERPL-SCNC: 18 MMOL/L — LOW (ref 22–31)
COLOR SPEC: YELLOW — SIGNIFICANT CHANGE UP
CREAT SERPL-MCNC: 0.79 MG/DL — SIGNIFICANT CHANGE UP (ref 0.5–1.3)
DIFF PNL FLD: ABNORMAL
EGFR: 77 ML/MIN/1.73M2 — SIGNIFICANT CHANGE UP
EGFR: 77 ML/MIN/1.73M2 — SIGNIFICANT CHANGE UP
EOSINOPHIL # BLD AUTO: 0.02 K/UL — SIGNIFICANT CHANGE UP (ref 0–0.5)
EOSINOPHIL NFR BLD AUTO: 0.1 % — SIGNIFICANT CHANGE UP (ref 0–6)
ESTIMATED AVERAGE GLUCOSE: 177 MG/DL — HIGH (ref 68–114)
GLUCOSE SERPL-MCNC: 380 MG/DL — HIGH (ref 70–99)
GLUCOSE UR QL: >=1000 MG/DL
HCT VFR BLD CALC: 27.2 % — LOW (ref 34.5–45)
HGB BLD-MCNC: 8.5 G/DL — LOW (ref 11.5–15.5)
IMM GRANULOCYTES NFR BLD AUTO: 0.4 % — SIGNIFICANT CHANGE UP (ref 0–0.9)
KETONES UR-MCNC: 80 MG/DL
LEUKOCYTE ESTERASE UR-ACNC: ABNORMAL
LYMPHOCYTES # BLD AUTO: 0.56 K/UL — LOW (ref 1–3.3)
LYMPHOCYTES # BLD AUTO: 4.1 % — LOW (ref 13–44)
MCHC RBC-ENTMCNC: 29.3 PG — SIGNIFICANT CHANGE UP (ref 27–34)
MCHC RBC-ENTMCNC: 31.3 G/DL — LOW (ref 32–36)
MCV RBC AUTO: 93.8 FL — SIGNIFICANT CHANGE UP (ref 80–100)
MONOCYTES # BLD AUTO: 0.86 K/UL — SIGNIFICANT CHANGE UP (ref 0–0.9)
MONOCYTES NFR BLD AUTO: 6.2 % — SIGNIFICANT CHANGE UP (ref 2–14)
NEUTROPHILS # BLD AUTO: 12.22 K/UL — HIGH (ref 1.8–7.4)
NEUTROPHILS NFR BLD AUTO: 88.7 % — HIGH (ref 43–77)
NITRITE UR-MCNC: NEGATIVE — SIGNIFICANT CHANGE UP
NRBC BLD AUTO-RTO: 0 /100 WBCS — SIGNIFICANT CHANGE UP (ref 0–0)
PH UR: 5 — SIGNIFICANT CHANGE UP (ref 5–8)
PLATELET # BLD AUTO: 355 K/UL — SIGNIFICANT CHANGE UP (ref 150–400)
POTASSIUM SERPL-MCNC: 4.4 MMOL/L — SIGNIFICANT CHANGE UP (ref 3.5–5.3)
POTASSIUM SERPL-SCNC: 4.4 MMOL/L — SIGNIFICANT CHANGE UP (ref 3.5–5.3)
PROT SERPL-MCNC: 6 G/DL — SIGNIFICANT CHANGE UP (ref 6–8.3)
PROT UR-MCNC: 30 MG/DL
RBC # BLD: 2.9 M/UL — LOW (ref 3.8–5.2)
RBC # FLD: 14.5 % — SIGNIFICANT CHANGE UP (ref 10.3–14.5)
SARS-COV-2 RNA SPEC QL NAA+PROBE: DETECTED
SODIUM SERPL-SCNC: 137 MMOL/L — SIGNIFICANT CHANGE UP (ref 135–145)
SP GR SPEC: 1.02 — SIGNIFICANT CHANGE UP (ref 1–1.03)
UROBILINOGEN FLD QL: 0.2 MG/DL — SIGNIFICANT CHANGE UP (ref 0.2–1)
WBC # BLD: 13.79 K/UL — HIGH (ref 3.8–10.5)
WBC # FLD AUTO: 13.79 K/UL — HIGH (ref 3.8–10.5)

## 2025-04-22 PROCEDURE — G0545: CPT

## 2025-04-22 PROCEDURE — 99232 SBSQ HOSP IP/OBS MODERATE 35: CPT

## 2025-04-22 PROCEDURE — 99221 1ST HOSP IP/OBS SF/LOW 40: CPT

## 2025-04-22 PROCEDURE — 99223 1ST HOSP IP/OBS HIGH 75: CPT

## 2025-04-22 PROCEDURE — 99233 SBSQ HOSP IP/OBS HIGH 50: CPT

## 2025-04-22 RX ORDER — DEXTROSE 50 % IN WATER 50 %
15 SYRINGE (ML) INTRAVENOUS ONCE
Refills: 0 | Status: COMPLETED | OUTPATIENT
Start: 2025-04-22 | End: 2025-04-27

## 2025-04-22 RX ORDER — DEXTROSE 50 % IN WATER 50 %
15 SYRINGE (ML) INTRAVENOUS ONCE
Refills: 0 | Status: COMPLETED | OUTPATIENT
Start: 2025-04-22 | End: 2025-04-22

## 2025-04-22 RX ORDER — SODIUM CHLORIDE 9 G/1000ML
1000 INJECTION, SOLUTION INTRAVENOUS
Refills: 0 | Status: DISCONTINUED | OUTPATIENT
Start: 2025-04-22 | End: 2025-04-23

## 2025-04-22 RX ORDER — SODIUM CHLORIDE 9 G/1000ML
1000 INJECTION, SOLUTION INTRAVENOUS ONCE
Refills: 0 | Status: COMPLETED | OUTPATIENT
Start: 2025-04-22 | End: 2025-04-22

## 2025-04-22 RX ORDER — DEXTROSE 50 % IN WATER 50 %
25 SYRINGE (ML) INTRAVENOUS ONCE
Refills: 0 | Status: COMPLETED | OUTPATIENT
Start: 2025-04-22 | End: 2025-04-22

## 2025-04-22 RX ADMIN — ATORVASTATIN CALCIUM 20 MILLIGRAM(S): 80 TABLET, FILM COATED ORAL at 21:51

## 2025-04-22 RX ADMIN — Medication 81 MILLIGRAM(S): at 11:43

## 2025-04-22 RX ADMIN — SODIUM CHLORIDE 150 MILLILITER(S): 9 INJECTION, SOLUTION INTRAVENOUS at 15:00

## 2025-04-22 RX ADMIN — DONEPEZIL HYDROCHLORIDE 5 MILLIGRAM(S): 5 TABLET ORAL at 21:51

## 2025-04-22 RX ADMIN — FUROSEMIDE 20 MILLIGRAM(S): 10 INJECTION INTRAMUSCULAR; INTRAVENOUS at 05:43

## 2025-04-22 RX ADMIN — INSULIN GLARGINE-YFGN 15 UNIT(S): 100 INJECTION, SOLUTION SUBCUTANEOUS at 08:37

## 2025-04-22 RX ADMIN — LISINOPRIL 40 MILLIGRAM(S): 5 TABLET ORAL at 05:43

## 2025-04-22 RX ADMIN — INSULIN LISPRO 5 UNIT(S): 100 INJECTION, SOLUTION INTRAVENOUS; SUBCUTANEOUS at 08:37

## 2025-04-22 RX ADMIN — Medication 25 GRAM(S): at 02:12

## 2025-04-22 RX ADMIN — Medication 4 MILLIGRAM(S): at 19:30

## 2025-04-22 RX ADMIN — APIXABAN 5 MILLIGRAM(S): 2.5 TABLET, FILM COATED ORAL at 17:35

## 2025-04-22 RX ADMIN — APIXABAN 5 MILLIGRAM(S): 2.5 TABLET, FILM COATED ORAL at 05:43

## 2025-04-22 RX ADMIN — Medication 4 MILLIGRAM(S): at 05:53

## 2025-04-22 RX ADMIN — Medication 25 GRAM(S): at 17:34

## 2025-04-22 RX ADMIN — INSULIN LISPRO 5 UNIT(S): 100 INJECTION, SOLUTION INTRAVENOUS; SUBCUTANEOUS at 17:35

## 2025-04-22 RX ADMIN — Medication 25 GRAM(S): at 21:20

## 2025-04-22 RX ADMIN — AMLODIPINE BESYLATE 10 MILLIGRAM(S): 10 TABLET ORAL at 05:43

## 2025-04-22 RX ADMIN — INSULIN LISPRO 6: 100 INJECTION, SOLUTION INTRAVENOUS; SUBCUTANEOUS at 12:48

## 2025-04-22 RX ADMIN — SODIUM CHLORIDE 1000 MILLILITER(S): 9 INJECTION, SOLUTION INTRAVENOUS at 14:47

## 2025-04-22 RX ADMIN — Medication 25 GRAM(S): at 10:35

## 2025-04-22 RX ADMIN — Medication 15 GRAM(S): at 20:39

## 2025-04-22 RX ADMIN — INSULIN LISPRO 10: 100 INJECTION, SOLUTION INTRAVENOUS; SUBCUTANEOUS at 08:36

## 2025-04-22 RX ADMIN — INSULIN LISPRO 5 UNIT(S): 100 INJECTION, SOLUTION INTRAVENOUS; SUBCUTANEOUS at 12:48

## 2025-04-22 NOTE — PROGRESS NOTE ADULT - ASSESSMENT
74-year-old white female with PMH HTN, HLD, diabetes mellitus on insulin and PAD S/P multiple LLE angios with L SFA and pop in stentocclusion with vascular ulcers/gangrene left lower extremity. Presented for elective LLE angiogram and was noted with elevated WBC and glucose and sent to ED for medical optimization prior to surgery.  Significant wounds to LLE with sensory and motor dysfunction at baseline ? from MS    Admitted to medicine  DM management and endocrine consult appreciated  Continue ASA and statin  Pain management  COVID+ on isolation; no acute surgical intervention at this time  Would consider palliative care consult  Offload/turn and position  H/O DVT consider resuming AC  Pt is at high risk for limb loss due to large area of tissue loss and severe PAD  Discussed with Dr Burden

## 2025-04-22 NOTE — CONSULT NOTE ADULT - SUBJECTIVE AND OBJECTIVE BOX
Patient is a 74y old  Female who presents with a chief complaint of Hyperglycemia (21 Apr 2025 21:53)    pt A&Ox1, disoriented to time, place and situation, states feels nauseous unable to provide information. spoke with spouse Trevon and left message for endo office  Type: 1? DM DX 25 year, known complications Endocrine Dr. Chrissy Mesa, unsure last a1c, Rx home tresiba 15 units @ HS and novolog ISS premeal, pt spouse states patient does all injections independently. uses freestyle gogo 3 CGM, denies previous Hx DKA/HHS, pt hy  diabetes education provided- A1c measure and BG targets  fasting, <180 2 hours postmeal.  inhospital BGM frequency and insulin administration, s/s of hyperglycemia/hypoglycemia and management, glycemic control and preventing complications, consistent carb diet, balanced plate method, consistent meal planning. sick day management, provider f/u    HPI:  74-year-old white female  presently residing in rehab center with history of diabetes mellitus on insulin hypertension and hyperlipidemia who had developed vascular ulcers/gangrene left lower extremity a and he was evaluated as outpatient by vascular surgeon scheduled for surgery today and because of glucose being out of control was sent to the emergency department here at [place] for further evaluation care treatment and management as well as stabilization of patient from a metabolic and glucose standpoint.  Patient states that on a good day her sugar runs 180-200.  No recent fever chills nausea vomiting or diarrhea.  ED COURSE:  Vitals: T 97.7  F , 90 HR  ,  /47 , RR 16 , SpO2  100% on RA  Labs significant for: wbc 15.92, ,   Imaging: No acute infilitrate on chest x ray   EKG: NSR, qtc 430  Pt received: 10u admelog, zosyn 3.375 g, vanc 1g, LR 1L   (21 Apr 2025 12:55)      PAST MEDICAL & SURGICAL HISTORY:  DM (diabetes mellitus)      HTN (hypertension)      No significant past surgical history      Allergies    No Known Allergies    Intolerances        MEDICATIONS  (STANDING):  amLODIPine   Tablet 10 milliGRAM(s) Oral daily  apixaban 5 milliGRAM(s) Oral every 12 hours  aspirin  chewable 81 milliGRAM(s) Oral daily  atorvastatin 20 milliGRAM(s) Oral at bedtime  dextrose 5%. 1000 milliLiter(s) (50 mL/Hr) IV Continuous <Continuous>  dextrose 5%. 1000 milliLiter(s) (100 mL/Hr) IV Continuous <Continuous>  dextrose 50% Injectable 25 Gram(s) IV Push once  dextrose 50% Injectable 12.5 Gram(s) IV Push once  dextrose 50% Injectable 25 Gram(s) IV Push once  donepezil 5 milliGRAM(s) Oral at bedtime  furosemide    Tablet 20 milliGRAM(s) Oral daily  glucagon  Injectable 1 milliGRAM(s) IntraMuscular once  insulin glargine Injectable (LANTUS) 15 Unit(s) SubCutaneous before breakfast  insulin lispro (ADMELOG) corrective regimen sliding scale   SubCutaneous three times a day before meals  insulin lispro (ADMELOG) corrective regimen sliding scale   SubCutaneous at bedtime  insulin lispro Injectable (ADMELOG) 5 Unit(s) SubCutaneous three times a day before meals  lisinopril 40 milliGRAM(s) Oral daily  piperacillin/tazobactam IVPB.. 3.375 Gram(s) IV Intermittent every 8 hours  sodium chloride 0.9%. 1000 milliLiter(s) (55 mL/Hr) IV Continuous <Continuous>       Patient is a 74y old  Female who presents with a chief complaint of Hyperglycemia (21 Apr 2025 21:53)    pt A&Ox1, disoriented to time, place and situation, states feels nauseous unable to provide information. spoke with spouse Trevon and spoke with Endo office, confirmed Dx T1  Type: T1 DM DX 25 year, known complications Endocrine Dr. Chrissy Mesa, unsure last a1c, Rx home tresiba 15 units @ HS and novolog ISS premeal, pt spouse states patient does all injections independently. uses freestyle gogo 3 CGM, denies previous Hx DKA/HHS, pt persistently hyperglycemia, not tolerating PO intake, noted 80 ketones on UA, spoke with resident re: concern for DKA.      HPI:  74-year-old white female  presently residing in rehab center with history of diabetes mellitus on insulin hypertension and hyperlipidemia who had developed vascular ulcers/gangrene left lower extremity a and he was evaluated as outpatient by vascular surgeon scheduled for surgery today and because of glucose being out of control was sent to the emergency department here at [place] for further evaluation care treatment and management as well as stabilization of patient from a metabolic and glucose standpoint.  Patient states that on a good day her sugar runs 180-200.  No recent fever chills nausea vomiting or diarrhea.  ED COURSE:  Vitals: T 97.7  F , 90 HR  ,  /47 , RR 16 , SpO2  100% on RA  Labs significant for: wbc 15.92, ,   Imaging: No acute infilitrate on chest x ray   EKG: NSR, qtc 430  Pt received: 10u admelog, zosyn 3.375 g, vanc 1g, LR 1L   (21 Apr 2025 12:55)      PAST MEDICAL & SURGICAL HISTORY:  DM (diabetes mellitus)      HTN (hypertension)      No significant past surgical history      Allergies    No Known Allergies    Intolerances        MEDICATIONS  (STANDING):  amLODIPine   Tablet 10 milliGRAM(s) Oral daily  apixaban 5 milliGRAM(s) Oral every 12 hours  aspirin  chewable 81 milliGRAM(s) Oral daily  atorvastatin 20 milliGRAM(s) Oral at bedtime  dextrose 5%. 1000 milliLiter(s) (50 mL/Hr) IV Continuous <Continuous>  dextrose 5%. 1000 milliLiter(s) (100 mL/Hr) IV Continuous <Continuous>  dextrose 50% Injectable 25 Gram(s) IV Push once  dextrose 50% Injectable 12.5 Gram(s) IV Push once  dextrose 50% Injectable 25 Gram(s) IV Push once  donepezil 5 milliGRAM(s) Oral at bedtime  furosemide    Tablet 20 milliGRAM(s) Oral daily  glucagon  Injectable 1 milliGRAM(s) IntraMuscular once  insulin glargine Injectable (LANTUS) 15 Unit(s) SubCutaneous before breakfast  insulin lispro (ADMELOG) corrective regimen sliding scale   SubCutaneous three times a day before meals  insulin lispro (ADMELOG) corrective regimen sliding scale   SubCutaneous at bedtime  insulin lispro Injectable (ADMELOG) 5 Unit(s) SubCutaneous three times a day before meals  lisinopril 40 milliGRAM(s) Oral daily  piperacillin/tazobactam IVPB.. 3.375 Gram(s) IV Intermittent every 8 hours  sodium chloride 0.9%. 1000 milliLiter(s) (55 mL/Hr) IV Continuous <Continuous>       Patient is a 74y old  Female who presents with a chief complaint of Hyperglycemia (21 Apr 2025 21:53)    pt A&Ox1, disoriented to time, place and situation, states feels nauseous unable to provide information. spoke with spouse Tervon and spoke with Endo office, confirmed Dx T1  Type: T1 DM DX 25 year, known complications Endocrine Dr. Chrissy Mesa, unsure last a1c, resides @ [facility]  tresiba 15 units @ HS and novolog ISS premeal,. uses CGM, denies previous Hx DKA/HHS, pt persistently hyperglycemia, not tolerating PO intake, noted 80 ketones on UA, spoke with resident re: concern for DKA.      HPI:  74-year-old white female  presently residing in rehab center with history of diabetes mellitus on insulin hypertension and hyperlipidemia who had developed vascular ulcers/gangrene left lower extremity a and he was evaluated as outpatient by vascular surgeon scheduled for surgery today and because of glucose being out of control was sent to the emergency department here at [place] for further evaluation care treatment and management as well as stabilization of patient from a metabolic and glucose standpoint.  Patient states that on a good day her sugar runs 180-200.  No recent fever chills nausea vomiting or diarrhea.  ED COURSE:  Vitals: T 97.7  F , 90 HR  ,  /47 , RR 16 , SpO2  100% on RA  Labs significant for: wbc 15.92, ,   Imaging: No acute infilitrate on chest x ray   EKG: NSR, qtc 430  Pt received: 10u admelog, zosyn 3.375 g, vanc 1g, LR 1L   (21 Apr 2025 12:55)      PAST MEDICAL & SURGICAL HISTORY:  DM (diabetes mellitus)      HTN (hypertension)      No significant past surgical history      Allergies    No Known Allergies    Intolerances        MEDICATIONS  (STANDING):  amLODIPine   Tablet 10 milliGRAM(s) Oral daily  apixaban 5 milliGRAM(s) Oral every 12 hours  aspirin  chewable 81 milliGRAM(s) Oral daily  atorvastatin 20 milliGRAM(s) Oral at bedtime  dextrose 5%. 1000 milliLiter(s) (50 mL/Hr) IV Continuous <Continuous>  dextrose 5%. 1000 milliLiter(s) (100 mL/Hr) IV Continuous <Continuous>  dextrose 50% Injectable 25 Gram(s) IV Push once  dextrose 50% Injectable 12.5 Gram(s) IV Push once  dextrose 50% Injectable 25 Gram(s) IV Push once  donepezil 5 milliGRAM(s) Oral at bedtime  furosemide    Tablet 20 milliGRAM(s) Oral daily  glucagon  Injectable 1 milliGRAM(s) IntraMuscular once  insulin glargine Injectable (LANTUS) 15 Unit(s) SubCutaneous before breakfast  insulin lispro (ADMELOG) corrective regimen sliding scale   SubCutaneous three times a day before meals  insulin lispro (ADMELOG) corrective regimen sliding scale   SubCutaneous at bedtime  insulin lispro Injectable (ADMELOG) 5 Unit(s) SubCutaneous three times a day before meals  lisinopril 40 milliGRAM(s) Oral daily  piperacillin/tazobactam IVPB.. 3.375 Gram(s) IV Intermittent every 8 hours  sodium chloride 0.9%. 1000 milliLiter(s) (55 mL/Hr) IV Continuous <Continuous>

## 2025-04-22 NOTE — DIETITIAN INITIAL EVALUATION ADULT - ORAL INTAKE PTA/DIET HISTORY
Pt from [facility] in [place] where she was on a minced and moist consistent carb diet and taking glucose control nutritional supplement BID. Currently on consistent carb soft and bite sized diet and tolerating well. Pt states appetite has been ok. As per nursing, son fed pt breakfast this morning. Only ate some. Likes Chocolate nutritional supplements. Does not eat fish or beef.  Pt from [facility] in [place] where she was on a minced and moist consistent carb diet and taking glucose control nutritional supplement BID. Currently on consistent carb soft and bite sized diet and tolerating well. Likes chocolate nutritional supplements. Does not eat fish or beef.

## 2025-04-22 NOTE — PROGRESS NOTE ADULT - SUBJECTIVE AND OBJECTIVE BOX
CC: Patient is a 74y old  Female who presents with a chief complaint of Hyperglycemia (22 Apr 2025 10:59)      Interval History:  Chart reviewed  No major overnight events  No complaints this morning    Care Discussed with Consultants/Other Providers: Yes    Vital Signs Last 24 Hrs  T(F): 97.8 (22 Apr 2025 05:22), Max: 98.5 (21 Apr 2025 20:13)  HR: 87 (22 Apr 2025 05:22) (83 - 97)  BP: 133/67 (22 Apr 2025 05:22) (122/50 - 133/67)  RR: 17 (22 Apr 2025 05:22) (17 - 18)  SpO2: 97% (22 Apr 2025 05:22) (97% - 100%)  I&O's Summary    21 Apr 2025 07:01  -  22 Apr 2025 07:00  --------------------------------------------------------  IN: 660 mL / OUT: 500 mL / NET: 160 mL      BMI (kg/m2): 22.1 (04-22-25 @ 08:34)  PHYSICAL EXAM:  GENERAL: NAD  HEENT: EOMI, PERRLA  Neck: Supple  NERVOUS SYSTEM:  CN grossly intact; follows commands  HEART: s1s2, Regular rate and rhythm; No high grade murmurs, No pitting edema  CHEST/LUNG: Clear to ascultation bilaterally; No high grade adventitious sounds; normal respiratory effort, no intercostal retractions  ABDOMEN: Soft, Nontender, Bowel sounds present, no guarding  MUSCULOSKELETAL/EXTREMITIES:  No clubbing or digital cyanosis  PSYCH: Appropriate affect, Alert    LABS:        POCT Blood Glucose.: 291 mg/dL (22 Apr 2025 12:05)  POCT Blood Glucose.: 333 mg/dL (22 Apr 2025 10:10)  POCT Blood Glucose.: 397 mg/dL (22 Apr 2025 08:24)  POCT Blood Glucose.: 353 mg/dL (21 Apr 2025 21:22)  POCT Blood Glucose.: 385 mg/dL (21 Apr 2025 17:15)                          8.5    13.79 )-----------( 355      ( 22 Apr 2025 10:01 )             27.2       04-22    137  |  102  |  17  ----------------------------<  380  4.4   |  18  |  0.79    Ca    9.2      22 Apr 2025 10:01  Mg     2.2     04-21    TPro  6.0  /  Alb  2.2  /  TBili  0.5  /  DBili  x   /  AST  12  /  ALT  10  /  AlkPhos  99  04-22          Lactate, Blood: 1.9 mmol/L (04-21 @ 09:15)

## 2025-04-22 NOTE — DIETITIAN INITIAL EVALUATION ADULT - ENERGY INTAKE
74-year-old white female with PMH HTN, HLD, diabetes mellitus on insulin and PAD S/P multiple LLE angios with L SFA and pop in stentocclusion with vascular ulcers/gangrene left lower extremity. Presented for elective LLE angiogram and was noted with elevated WBC and glucose and sent to ED for medical optimization prior to surgery.  Significant wounds to LLE with sensory and motor dysfunction at baseline ? from MS    Admitted to medicine  DM management and endocrine consult appreciated  Continue ASA and statin  Pain management  COVID+ on isolation; no acute surgical intervention at this time  Would consider palliative care consult  Offload/turn and position  H/O DVT consider resuming AC  Pt is at high risk for limb loss due to large area of tissue loss and severe PAD  Discussed with Dr Burden     Fair (50-75%)

## 2025-04-22 NOTE — DIETITIAN INITIAL EVALUATION ADULT - NUTRITIONGOAL OUTCOME1
Skin integrity to show signs of improvement as protein energy requirements are met healing of sacral wound   if pt has amputation , healing of stump

## 2025-04-22 NOTE — CONSULT NOTE ADULT - SUBJECTIVE AND OBJECTIVE BOX
HPI:  74-year-old white female  presently residing in rehab center with history of diabetes mellitus on insulin hypertension and hyperlipidemia who had developed vascular ulcers/gangrene left lower extremity a and he was evaluated as outpatient by vascular surgeon scheduled for surgery today and because of glucose being out of control was sent to the emergency department here at [place] for further evaluation care treatment and management as well as stabilization of patient from a metabolic and glucose standpoint.  Patient states that on a good day her sugar runs 180-200.  No recent fever chills nausea vomiting or diarrhea.  ED COURSE:  Vitals: T 97.7  F , 90 HR  ,  /47 , RR 16 , SpO2  100% on RA  Labs significant for: wbc 15.92, ,   Imaging: No acute infilitrate on chest x ray   EKG: NSR, qtc 430  Pt received: 10u admelog, zosyn 3.375 g, vanc 1g, LR 1L   (21 Apr 2025 12:55)    PERTINENT PM/SXH:   DM (diabetes mellitus)    HTN (hypertension)      No significant past surgical history      FAMILY HISTORY:    Family Hx substance abuse [ ]yes [ ]no  ITEMS NOT CHECKED ARE NOT PRESENT    SOCIAL HISTORY:   Significant other/partner[ ]  Children[ ]  Scientologist/Spirituality:  Substance hx:  [ ]   Tobacco hx:  [ ]   Alcohol hx: [ ]   Home Opioid hx:  [ ] I-Stop Reference No:  Living Situation: [ ]Home  [ ]Long term care  [ ]Rehab [ ]Other    ADVANCE DIRECTIVES:    DNR/MOLST  [ ]  Living Will  [ ]   DECISION MAKER(s):  [ ] Health Care Proxy(s)  [ ] Surrogate(s)  [ ] Guardian           Name(s): Phone Number(s):    BASELINE (I)ADL(s) (prior to admission):  [place]: [ ]Total  [ ] Moderate [ ]Dependent    Allergies    No Known Allergies    Intolerances    MEDICATIONS  (STANDING):  amLODIPine   Tablet 10 milliGRAM(s) Oral daily  apixaban 5 milliGRAM(s) Oral every 12 hours  aspirin  chewable 81 milliGRAM(s) Oral daily  atorvastatin 20 milliGRAM(s) Oral at bedtime  dextrose 5%. 1000 milliLiter(s) (50 mL/Hr) IV Continuous <Continuous>  dextrose 5%. 1000 milliLiter(s) (100 mL/Hr) IV Continuous <Continuous>  dextrose 50% Injectable 25 Gram(s) IV Push once  dextrose 50% Injectable 12.5 Gram(s) IV Push once  dextrose 50% Injectable 25 Gram(s) IV Push once  donepezil 5 milliGRAM(s) Oral at bedtime  furosemide    Tablet 20 milliGRAM(s) Oral daily  glucagon  Injectable 1 milliGRAM(s) IntraMuscular once  insulin glargine Injectable (LANTUS) 15 Unit(s) SubCutaneous before breakfast  insulin lispro (ADMELOG) corrective regimen sliding scale   SubCutaneous three times a day before meals  insulin lispro (ADMELOG) corrective regimen sliding scale   SubCutaneous at bedtime  insulin lispro Injectable (ADMELOG) 5 Unit(s) SubCutaneous three times a day before meals  lactated ringers Bolus 1000 milliLiter(s) IV Bolus once  lactated ringers. 1000 milliLiter(s) (150 mL/Hr) IV Continuous <Continuous>  lisinopril 40 milliGRAM(s) Oral daily  piperacillin/tazobactam IVPB.. 3.375 Gram(s) IV Intermittent every 8 hours    MEDICATIONS  (PRN):  acetaminophen     Tablet .. 650 milliGRAM(s) Oral every 6 hours PRN Temp greater or equal to 38C (100.4F), Mild Pain (1 - 3)  dextrose Oral Gel 15 Gram(s) Oral once PRN Blood Glucose LESS THAN 70 milliGRAM(s)/deciliter  melatonin 3 milliGRAM(s) Oral at bedtime PRN Insomnia  ondansetron Injectable 4 milliGRAM(s) IV Push every 6 hours PRN Nausea and/or Vomiting    PRESENT SYMPTOMS: [ ]Unable to self-report  [ ] CPOT [ ] PAINADs [ ] RDOS  Source if other than patient:  [ ]Family   [ ]Team     Pain: [ ]yes [ ]no  QOL impact -   Location -                    Aggravating factors -  Quality -  Radiation -  Timing-  Severity (0-10 scale):  Minimal acceptable level (0-10 scale):     CPOT:    https://www.sccm.org/getattachment/dki90j86-2y1w-3h6l-4x1v-8698l2709y9z/Critical-Care-Pain-Observation-Tool-(CPOT)    PAIN AD Score:   http://[email or web address]/cog/painad.pdf (press ctrl +  left click to view)    Dyspnea:                           [ ]Mild [ ]Moderate [ ]Severe      RDOS:  0 to 2  minimal or no respiratory distress   3  mild distress  4 to 6 moderate distress  >7 severe distress  https://[email or web address]/handouts/hen/Respiratory_Distress_Observation_Scale.pdf (Ctrl +  left click to view)     Anxiety:                             [ ]Mild [ ]Moderate [ ]Severe  Fatigue:                             [ ]Mild [ ]Moderate [ ]Severe  Nausea:                             [ ]Mild [ ]Moderate [ ]Severe  Loss of appetite:              [ ]Mild [ ]Moderate [ ]Severe  Constipation:                    [ ]Mild [ ]Moderate [ ]Severe    PCSSQ[Palliative Care Spiritual Screening Question]   Severity (0-10):  Score of 4 or > indicate consideration of Chaplaincy referral.  Chaplaincy Referral: [ ] yes [ ] refused [ ] following [ ] Deferred     Caregiver [place]? : [ ] yes [ ] no [ ] Deferred [ ] Declined             Social work referral [ ] Patient & Family Centered Care Referral [ ]     Anticipatory Grief present?:  [ ] yes [ ] no  [ ] Deferred                  Social work referral [ ] Chaplaincy Referral[ ]      Other Symptoms:  [ ]All other review of systems negative     Palliative Performance Status Version 2:         %    http://npcrc.org/files/news/palliative_performance_scale_ppsv2.pdf  PHYSICAL EXAM:  Vital Signs Last 24 Hrs  T(C): 36.6 (22 Apr 2025 05:22), Max: 36.9 (21 Apr 2025 20:13)  T(F): 97.8 (22 Apr 2025 05:22), Max: 98.5 (21 Apr 2025 20:13)  HR: 87 (22 Apr 2025 05:22) (83 - 97)  BP: 133/67 (22 Apr 2025 05:22) (122/50 - 133/67)  BP(mean): 70 (21 Apr 2025 14:51) (70 - 70)  RR: 17 (22 Apr 2025 05:22) (17 - 18)  SpO2: 97% (22 Apr 2025 05:22) (97% - 100%)    Parameters below as of 22 Apr 2025 05:22  Patient On (Oxygen Delivery Method): room air     I&O's Summary    21 Apr 2025 07:01  -  22 Apr 2025 07:00  --------------------------------------------------------  IN: 660 mL / OUT: 500 mL / NET: 160 mL      GENERAL: [ ]Cachexia    [ ]Alert  [ ]Oriented x   [ ]Lethargic  [ ]Unarousable  [ ]Verbal  [ ]Non-Verbal  Behavioral:   [ ] Anxiety  [ ] Delirium [ ] Agitation [ ] Other  HEENT:  [ ]Normal   [ ]Dry mouth   [ ]ET Tube/Trach  [ ]Oral lesions  PULMONARY:   [ ]Clear [ ]Tachypnea  [ ]Audible excessive secretions   [ ]Rhonchi        [ ]Right [ ]Left [ ]Bilateral  [ ]Crackles        [ ]Right [ ]Left [ ]Bilateral  [ ]Wheezing     [ ]Right [ ]Left [ ]Bilateral  [ ]Diminished breath sounds [ ]right [ ]left [ ]bilateral  CARDIOVASCULAR:    [ ]Regular [ ]Irregular [ ]Tachy  [ ]Sheldon [ ]Murmur [ ]Other  GASTROINTESTINAL:  [ ]Soft  [ ]Distended   [ ]+BS  [ ]Non tender [ ]Tender  [ ]Other [ ]PEG [ ]OGT/ NGT  Last BM:  GENITOURINARY:  [ ]Normal [ ] Incontinent   [ ]Oliguria/Anuria   [ ][place]  MUSCULOSKELETAL:   [ ]Normal   [ ]Weakness  [ ]Bed/Wheelchair bound [ ]Edema  NEUROLOGIC:   [ ]No focal deficits  [ ]Cognitive impairment  [ ]Dysphagia [ ]Dysarthria [ ]Paresis [ ]Other   SKIN:   [ ]Normal  [ ]Rash  [ ]Other  [ ]Pressure ulcer(s)       Present on admission [ ]y [ ]n    CRITICAL CARE:  [ ] Shock Present  [ ]Septic [ ]Cardiogenic [ ]Neurologic [ ]Hypovolemic  [ ]  Vasopressors [ ]  Inotropes   [ ]Respiratory failure present [ ]Mechanical ventilation [ ]Non-invasive ventilatory support [ ]High flow    [ ]Acute  [ ]Chronic [ ]Hypoxic  [ ]Hypercarbic [ ]Other  [ ]Other organ failure     LABS:                        8.5    13.79 )-----------( 355      ( 22 Apr 2025 10:01 )             27.2   04-22    137  |  102  |  17  ----------------------------<  380[H]  4.4   |  18[L]  |  0.79    Ca    9.2      22 Apr 2025 10:01  Mg     2.2     04-21    TPro  6.0  /  Alb  2.2[L]  /  TBili  0.5  /  DBili  x   /  AST  12[L]  /  ALT  10[L]  /  AlkPhos  99  04-22      Urinalysis Basic - ( 22 Apr 2025 10:01 )    Color: x / Appearance: x / SG: x / pH: x  Gluc: 380 mg/dL / Ketone: x  / Bili: x / Urobili: x   Blood: x / Protein: x / Nitrite: x   Leuk Esterase: x / RBC: x / WBC x   Sq Epi: x / Non Sq Epi: x / Bacteria: x      RADIOLOGY & ADDITIONAL STUDIES:    PROTEIN CALORIE MALNUTRITION PRESENT: [ ]mild [ ]moderate [ ]severe [ ]underweight [ ]morbid obesity  https://www.andeal.org/vault/2440/web/files/ONC/Table_Clinical%20Characteristics%20to%20Document%20Malnutrition-White%20JV%20et%20al%202012.pdf    Height (cm): 160 (04-22-25 @ 08:34)  Weight (kg): 56.7 (04-22-25 @ 08:34)  BMI (kg/m2): 22.1 (04-22-25 @ 08:34)    [ ]PPSV2 < or = to 30% [ ]significant weight loss  [ ]poor nutritional intake  [ ]anasarca[ ]Artificial Nutrition      Other REFERRALS:  [ ]Hospice  [ ]Child Life  [ ]Social Work  [ ]Case management [ ]Holistic Therapy      HPI:  74-year-old white female  presently residing in rehab center with history of diabetes mellitus on insulin hypertension and hyperlipidemia who had developed vascular ulcers/gangrene left lower extremity a and he was evaluated as outpatient by vascular surgeon scheduled for surgery today and because of glucose being out of control was sent to the emergency department here at [place] for further evaluation care treatment and management as well as stabilization of patient from a metabolic and glucose standpoint.  Patient states that on a good day her sugar runs 180-200.  No recent fever chills nausea vomiting or diarrhea.  ED COURSE:  Vitals: T 97.7  F , 90 HR  ,  /47 , RR 16 , SpO2  100% on RA  Labs significant for: wbc 15.92, ,   Imaging: No acute infilitrate on chest x ray   EKG: NSR, qtc 430  Pt received: 10u admelog, zosyn 3.375 g, vanc 1g, LR 1L   (21 Apr 2025 12:55)    PERTINENT PM/SXH:   DM (diabetes mellitus)    HTN (hypertension)      No significant past surgical history      FAMILY HISTORY: no known hx in first degree relatives    Family Hx substance abuse [ ]yes [ x]no  ITEMS NOT CHECKED ARE NOT PRESENT    SOCIAL HISTORY:   Significant other/partner[ x]  Children[ x]  Jain/Spirituality:  Substance hx:  [ ]   Tobacco hx:  [ ]   Alcohol hx: [ ]   Home Opioid hx:  [ ] I-Stop Reference No:  Living Situation: [ ]Home  [ ]Long term care  [x ]Rehab [ ]Other    ADVANCE DIRECTIVES:    DNR/MOLST  [ ]  Living Will  [ ]   DECISION MAKER(s):  [ ] Health Care Proxy(s)  [x ] Surrogate(s)  [ ] Guardian           Name(s): Phone Number(s): Trevon, spouse, number per EMR    BASELINE (I)ADL(s) (prior to admission):  [place]: [ ]Total  [ ] Moderate [x ]Dependent    Allergies    No Known Allergies    Intolerances    MEDICATIONS  (STANDING):  amLODIPine   Tablet 10 milliGRAM(s) Oral daily  apixaban 5 milliGRAM(s) Oral every 12 hours  aspirin  chewable 81 milliGRAM(s) Oral daily  atorvastatin 20 milliGRAM(s) Oral at bedtime  dextrose 5%. 1000 milliLiter(s) (50 mL/Hr) IV Continuous <Continuous>  dextrose 5%. 1000 milliLiter(s) (100 mL/Hr) IV Continuous <Continuous>  dextrose 50% Injectable 25 Gram(s) IV Push once  dextrose 50% Injectable 12.5 Gram(s) IV Push once  dextrose 50% Injectable 25 Gram(s) IV Push once  donepezil 5 milliGRAM(s) Oral at bedtime  furosemide    Tablet 20 milliGRAM(s) Oral daily  glucagon  Injectable 1 milliGRAM(s) IntraMuscular once  insulin glargine Injectable (LANTUS) 15 Unit(s) SubCutaneous before breakfast  insulin lispro (ADMELOG) corrective regimen sliding scale   SubCutaneous three times a day before meals  insulin lispro (ADMELOG) corrective regimen sliding scale   SubCutaneous at bedtime  insulin lispro Injectable (ADMELOG) 5 Unit(s) SubCutaneous three times a day before meals  lactated ringers Bolus 1000 milliLiter(s) IV Bolus once  lactated ringers. 1000 milliLiter(s) (150 mL/Hr) IV Continuous <Continuous>  lisinopril 40 milliGRAM(s) Oral daily  piperacillin/tazobactam IVPB.. 3.375 Gram(s) IV Intermittent every 8 hours    MEDICATIONS  (PRN):  acetaminophen     Tablet .. 650 milliGRAM(s) Oral every 6 hours PRN Temp greater or equal to 38C (100.4F), Mild Pain (1 - 3)  dextrose Oral Gel 15 Gram(s) Oral once PRN Blood Glucose LESS THAN 70 milliGRAM(s)/deciliter  melatonin 3 milliGRAM(s) Oral at bedtime PRN Insomnia  ondansetron Injectable 4 milliGRAM(s) IV Push every 6 hours PRN Nausea and/or Vomiting    PRESENT SYMPTOMS: [ x]Unable to self-report  [ ] CPOT [x ] PAINADs [ x] RDOS  Source if other than patient:  [ ]Family   [ ]Team     Pain: [ ]yes [ ]no  QOL impact -   Location -                    Aggravating factors -  Quality -  Radiation -  Timing-  Severity (0-10 scale):  Minimal acceptable level (0-10 scale):     CPOT:    https://www.sccm.org/getattachment/rxr97n72-3y5b-0c9r-1h8x-7001z8615f7h/Critical-Care-Pain-Observation-Tool-(CPOT)    PAIN AD Score:   http://geriatrictoolkit.[email or web address]/cog/painad.pdf (press ctrl +  left click to view)    Dyspnea:                           [ ]Mild [ ]Moderate [ ]Severe      RDOS:  0 to 2  minimal or no respiratory distress   3  mild distress  4 to 6 moderate distress  >7 severe distress  https://[email or web address]/handouts/hen/Respiratory_Distress_Observation_Scale.pdf (Ctrl +  left click to view)     Anxiety:                             [ ]Mild [ ]Moderate [ ]Severe  Fatigue:                             [ ]Mild [ ]Moderate [ ]Severe  Nausea:                             [ ]Mild [ ]Moderate [ ]Severe  Loss of appetite:              [ ]Mild [ ]Moderate [ ]Severe  Constipation:                    [ ]Mild [ ]Moderate [ ]Severe    PCSSQ[Palliative Care Spiritual Screening Question]   Severity (0-10):  Score of 4 or > indicate consideration of Chaplaincy referral.  Chaplaincy Referral: [ ] yes [ ] refused [ ] following [ x] Deferred     Caregiver [place]? : [ ] yes [x ] no [ ] Deferred [ ] Declined             Social work referral [ ] Patient & Family Centered Care Referral [ ]     Anticipatory Grief present?:  [ ] yes [x ] no  [ ] Deferred                  Social work referral [ ] Chaplaincy Referral[ ]      Other Symptoms:  [ ]All other review of systems negative     Palliative Performance Status Version 2:      20-30   %    http://npcrc.org/files/news/palliative_performance_scale_ppsv2.pdf  PHYSICAL EXAM:  Vital Signs Last 24 Hrs  T(C): 36.6 (22 Apr 2025 05:22), Max: 36.9 (21 Apr 2025 20:13)  T(F): 97.8 (22 Apr 2025 05:22), Max: 98.5 (21 Apr 2025 20:13)  HR: 87 (22 Apr 2025 05:22) (83 - 97)  BP: 133/67 (22 Apr 2025 05:22) (122/50 - 133/67)  BP(mean): 70 (21 Apr 2025 14:51) (70 - 70)  RR: 17 (22 Apr 2025 05:22) (17 - 18)  SpO2: 97% (22 Apr 2025 05:22) (97% - 100%)    Parameters below as of 22 Apr 2025 05:22  Patient On (Oxygen Delivery Method): room air     I&O's Summary    21 Apr 2025 07:01  -  22 Apr 2025 07:00  --------------------------------------------------------  IN: 660 mL / OUT: 500 mL / NET: 160 mL      GENERAL: [ ]Cachexia    [ ]Alert  [ ]Oriented x   [x ]Lethargic  [ ]Unarousable  [ ]Verbal  [ ]Non-Verbal  Behavioral:   [ ] Anxiety  [ ] Delirium [ ] Agitation [ ] Other  HEENT:  [x ]Normal   [ ]Dry mouth   [ ]ET Tube/Trach  [ ]Oral lesions  PULMONARY:   [ ]Clear [ ]Tachypnea  [ ]Audible excessive secretions   [ ]Rhonchi        [ ]Right [ ]Left [ ]Bilateral  [ ]Crackles        [ ]Right [ ]Left [ ]Bilateral  [ ]Wheezing     [ ]Right [ ]Left [ ]Bilateral  [ x]Diminished breath sounds [ ]right [ ]left [ ]bilateral  CARDIOVASCULAR:    [x ]Regular [ ]Irregular [ ]Tachy  [ ]Sheldon [ ]Murmur [ ]Other  GASTROINTESTINAL:  [ x]Soft  [ ]Distended   [x ]+BS  [ ]Non tender [ ]Tender  [ ]Other [ ]PEG [ ]OGT/ NGT  Last BM:  GENITOURINARY:  [ ]Normal [ x] Incontinent   [ ]Oliguria/Anuria   [ ][place]  MUSCULOSKELETAL:   [ ]Normal   [ x]Weakness  [x ]Bed/Wheelchair bound [ ]Edema  NEUROLOGIC:   [ ]No focal deficits  [ x]Cognitive impairment  [ ]Dysphagia [ ]Dysarthria [ ]Paresis [ ]Other   SKIN:   [ ]Normal  [ ]Rash  [ ]Other  [x ]Pressure ulcer(s)       Present on admission [x ]y [ ]n    CRITICAL CARE:  [ ] Shock Present  [ ]Septic [ ]Cardiogenic [ ]Neurologic [ ]Hypovolemic  [ ]  Vasopressors [ ]  Inotropes   [ ]Respiratory failure present [ ]Mechanical ventilation [ ]Non-invasive ventilatory support [ ]High flow    [ ]Acute  [ ]Chronic [ ]Hypoxic  [ ]Hypercarbic [ ]Other  [ ]Other organ failure     LABS:                        8.5    13.79 )-----------( 355      ( 22 Apr 2025 10:01 )             27.2   04-22    137  |  102  |  17  ----------------------------<  380[H]  4.4   |  18[L]  |  0.79    Ca    9.2      22 Apr 2025 10:01  Mg     2.2     04-21    TPro  6.0  /  Alb  2.2[L]  /  TBili  0.5  /  DBili  x   /  AST  12[L]  /  ALT  10[L]  /  AlkPhos  99  04-22      Urinalysis Basic - ( 22 Apr 2025 10:01 )    Color: x / Appearance: x / SG: x / pH: x  Gluc: 380 mg/dL / Ketone: x  / Bili: x / Urobili: x   Blood: x / Protein: x / Nitrite: x   Leuk Esterase: x / RBC: x / WBC x   Sq Epi: x / Non Sq Epi: x / Bacteria: x      RADIOLOGY & ADDITIONAL STUDIES:  < from: [facility] Duplex Lower Ext Vein Scan, Bilat (04.21.25 @ 14:14) >    ACC: [id number] EXAM:  DUPLEX SCAN EXT VEINS LOWER BI   ORDERED BY: JAYCE HOBSON     PROCEDURE DATE:  04/21/2025          INTERPRETATION:  CLINICAL INFORMATION: Rule out DVT.    COMPARISON: None available.    TECHNIQUE: Duplex sonography of the BILATERAL LOWER extremity veins with   color and spectral Doppler, with and without compression.    FINDINGS:    RIGHT:  Normal compressibility of the RIGHT common femoral, femoral and popliteal   veins.  Doppler examination shows normal spontaneousand phasic flow.  No RIGHT posterior tibial or peroneal vein thrombosis is detected.  The   soleal and gastrocnemius veins were not visualized.    LEFT:  Normal compressibility of the LEFT common femoral, femoral and popliteal   veins.  Doppler examination shows normal spontaneous and phasic flow.  No LEFT posterior tibial peroneal vein thrombosis is detected. The soleal   and gastrocnemius veins were not visualized.    IMPRESSION:  No evidence of deep venous thrombosis in the visualized deep veins of   either lower extremity noted above.    --- End of Report ---            VAHID VELEZ MD; Attending Radiologist  This document has been electronically signed. Apr 21 2025  3:19PM    < end of copied text >      PROTEIN CALORIE MALNUTRITION PRESENT: [ ]mild [ ]moderate [ ]severe [ ]underweight [ ]morbid obesity  https://www.andeal.org/vault/2580/web/files/ONC/Table_Clinical%20Characteristics%20to%20Document%20Malnutrition-White%20JV%20et%20al%202012.pdf    Height (cm): 160 (04-22-25 @ 08:34)  Weight (kg): 56.7 (04-22-25 @ 08:34)  BMI (kg/m2): 22.1 (04-22-25 @ 08:34)    [x ]PPSV2 < or = to 30% [ ]significant weight loss  [x ]poor nutritional intake  [ ]anasarca[ ]Artificial Nutrition      Other REFERRALS:  [ ]Hospice  [ ]Child Life  [x ]Social Work  [ ]Case management [ ]Holistic Therapy

## 2025-04-22 NOTE — DIETITIAN INITIAL EVALUATION ADULT - ORAL NUTRITION SUPPLEMENTS
Karis, chocolate BID  220kcal, 10g protein Glucerna, chocolate BID  220kcal, 10g protein  Ensure Max Protein QD  150kcal, 30g protein Glucerna, chocolate BID  220kcal, 10g protein per 8 oz serv  Ensure Max Protein 11 oz QD  150kcal, 30g protein per serv

## 2025-04-22 NOTE — DIETITIAN INITIAL EVALUATION ADULT - NS FNS DIET ORDER
Diet, Consistent Carbohydrate w/Evening Snack:   Soft and Bite Sized ([id number]) (04-21-25 @ 14:02)

## 2025-04-22 NOTE — DIETITIAN INITIAL EVALUATION ADULT - ETIOLOGY
Related to compromised skin integrity Related to DM2 exacerbated by stress of gangrenous LE Related to compromised skin integrity, see skin section

## 2025-04-22 NOTE — CONSULT NOTE ADULT - SUBJECTIVE AND OBJECTIVE BOX
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    PAST MEDICAL & SURGICAL HISTORY:  DM (diabetes mellitus)      HTN (hypertension)      No significant past surgical history          Allergies    No Known Allergies    Intolerances        MEDICATIONS  (STANDING):  amLODIPine   Tablet 10 milliGRAM(s) Oral daily  apixaban 5 milliGRAM(s) Oral every 12 hours  aspirin  chewable 81 milliGRAM(s) Oral daily  atorvastatin 20 milliGRAM(s) Oral at bedtime  dextrose 5%. 1000 milliLiter(s) (50 mL/Hr) IV Continuous <Continuous>  dextrose 5%. 1000 milliLiter(s) (100 mL/Hr) IV Continuous <Continuous>  dextrose 50% Injectable 25 Gram(s) IV Push once  dextrose 50% Injectable 12.5 Gram(s) IV Push once  dextrose 50% Injectable 25 Gram(s) IV Push once  donepezil 5 milliGRAM(s) Oral at bedtime  furosemide    Tablet 20 milliGRAM(s) Oral daily  glucagon  Injectable 1 milliGRAM(s) IntraMuscular once  insulin glargine Injectable (LANTUS) 15 Unit(s) SubCutaneous before breakfast  insulin lispro (ADMELOG) corrective regimen sliding scale   SubCutaneous three times a day before meals  insulin lispro (ADMELOG) corrective regimen sliding scale   SubCutaneous at bedtime  insulin lispro Injectable (ADMELOG) 5 Unit(s) SubCutaneous three times a day before meals  lactated ringers. 1000 milliLiter(s) (150 mL/Hr) IV Continuous <Continuous>  lisinopril 40 milliGRAM(s) Oral daily  piperacillin/tazobactam IVPB.. 3.375 Gram(s) IV Intermittent every 8 hours    MEDICATIONS  (PRN):  acetaminophen     Tablet .. 650 milliGRAM(s) Oral every 6 hours PRN Temp greater or equal to 38C (100.4F), Mild Pain (1 - 3)  dextrose Oral Gel 15 Gram(s) Oral once PRN Blood Glucose LESS THAN 70 milliGRAM(s)/deciliter  melatonin 3 milliGRAM(s) Oral at bedtime PRN Insomnia  ondansetron Injectable 4 milliGRAM(s) IV Push every 6 hours PRN Nausea and/or Vomiting      FAMILY HISTORY:          ROS:  CONSTITUTIONAL: No fever, weight loss, or fatigue  EYES: No eye pain, visual disturbances, or discharge  ENMT:  No difficulty hearing, tinnitus, vertigo; No sinus or throat pain  NECK: No pain or stiffness  BREASTS: No pain, masses, or nipple discharge  RESPIRATORY: No cough, wheezing, chills or hemoptysis; No shortness of breath  CARDIOVASCULAR: No chest pain, palpitations, dizziness, or leg swelling  GASTROINTESTINAL: No abdominal or epigastric pain. No nausea, vomiting, or hematemesis; No diarrhea or constipation. No melena or hematochezia.  GENITOURINARY: No dysuria, frequency, hematuria, or incontinence  NEUROLOGICAL: No headaches, memory loss, loss of strength, numbness, or tremors  SKIN: No itching, burning, rashes, or lesions   LYMPH NODES: No enlarged glands  ENDOCRINE: No heat or cold intolerance; No hair loss  MUSCULOSKELETAL: No joint pain or swelling; No muscle, back, or extremity pain  PSYCHIATRIC: No depression, anxiety, mood swings, or difficulty sleeping  HEME/LYMPH: No easy bruising, or bleeding gums  ALLERGY AND IMMUNOLOGIC: No hives or eczema    PHYSICAL EXAM-    Height (cm): 160 (04-22 @ 08:34)  Weight (kg): 56.7 (04-22 @ 08:34)  BMI (kg/m2): 22.1 (04-22 @ 08:34)  Vital Signs Last 24 Hrs  T(C): 36.9 (22 Apr 2025 14:51), Max: 36.9 (21 Apr 2025 20:13)  T(F): 98.4 (22 Apr 2025 14:51), Max: 98.5 (21 Apr 2025 20:13)  HR: 79 (22 Apr 2025 14:51) (79 - 97)  BP: 115/65 (22 Apr 2025 14:51) (115/65 - 133/67)  BP(mean): --  RR: 18 (22 Apr 2025 14:51) (17 - 18)  SpO2: 99% (22 Apr 2025 14:51) (97% - 99%)    Parameters below as of 22 Apr 2025 14:51  Patient On (Oxygen Delivery Method): room air        Constitutional: well developed, well nourished, no apparent distress, alert, oriented x 3.  Neck: Supple   Pulmonary: no respiratory distress, normal respiratory rhythm and effort, lungs are clear to auscultation/percussion. No CVA tenderness.  Cardiovascular: heart rate normal, normal sinus rhythm; no murmurs, gallops, rubs, heaves or thrills   Abdomen: soft, non-tender, +BS, no guarding/rebound/rigidity.  Skin: Multiple areas of dry gangrene of left lower extremity, Sacral region with stage 2 pressure ulcer, with no infection.                             8.5    13.79 )-----------( 355      ( 22 Apr 2025 10:01 )             27.2     04-22    137  |  102  |  17  ----------------------------<  380[H]  4.4   |  18[L]  |  0.79    Ca    9.2      22 Apr 2025 10:01  Mg     2.2     04-21    TPro  6.0  /  Alb  2.2[L]  /  TBili  0.5  /  DBili  x   /  AST  12[L]  /  ALT  10[L]  /  AlkPhos  99  04-22      Radiology:

## 2025-04-22 NOTE — DIETITIAN INITIAL EVALUATION ADULT - ADD RECOMMEND
Encourage compliance with therapeutic diet  Multivitamin with minerals PO QD  Vitamin C 500mg PO BID Encourage supplement intake and compliance with therapeutic diet  Multivitamin with minerals PO QD  Vitamin C 500mg PO BID Encourage supplement intake and compliance with therapeutic diet  Multivitamin with minerals 1 tab  PO QD  Vitamin C 500mg PO BID

## 2025-04-22 NOTE — PROGRESS NOTE ADULT - SUBJECTIVE AND OBJECTIVE BOX
Patient is a 74y old  Female who presents with a chief complaint of Hyperglycemia (22 Apr 2025 08:34)    Pt now on isolation for COVID. No c/o SOB or CP. Reports LLE pain    MEDICATIONS  (STANDING):  amLODIPine   Tablet 10 milliGRAM(s) Oral daily  apixaban 5 milliGRAM(s) Oral every 12 hours  aspirin  chewable 81 milliGRAM(s) Oral daily  atorvastatin 20 milliGRAM(s) Oral at bedtime  dextrose 5%. 1000 milliLiter(s) (50 mL/Hr) IV Continuous <Continuous>  dextrose 5%. 1000 milliLiter(s) (100 mL/Hr) IV Continuous <Continuous>  dextrose 50% Injectable 25 Gram(s) IV Push once  dextrose 50% Injectable 12.5 Gram(s) IV Push once  dextrose 50% Injectable 25 Gram(s) IV Push once  donepezil 5 milliGRAM(s) Oral at bedtime  furosemide    Tablet 20 milliGRAM(s) Oral daily  glucagon  Injectable 1 milliGRAM(s) IntraMuscular once  insulin glargine Injectable (LANTUS) 15 Unit(s) SubCutaneous before breakfast  insulin lispro (ADMELOG) corrective regimen sliding scale   SubCutaneous three times a day before meals  insulin lispro (ADMELOG) corrective regimen sliding scale   SubCutaneous at bedtime  insulin lispro Injectable (ADMELOG) 5 Unit(s) SubCutaneous three times a day before meals  lisinopril 40 milliGRAM(s) Oral daily  piperacillin/tazobactam IVPB.. 3.375 Gram(s) IV Intermittent every 8 hours  sodium chloride 0.9%. 1000 milliLiter(s) (55 mL/Hr) IV Continuous <Continuous>    MEDICATIONS  (PRN):  acetaminophen     Tablet .. 650 milliGRAM(s) Oral every 6 hours PRN Temp greater or equal to 38C (100.4F), Mild Pain (1 - 3)  dextrose Oral Gel 15 Gram(s) Oral once PRN Blood Glucose LESS THAN 70 milliGRAM(s)/deciliter  melatonin 3 milliGRAM(s) Oral at bedtime PRN Insomnia  ondansetron Injectable 4 milliGRAM(s) IV Push every 6 hours PRN Nausea and/or Vomiting    Allergies  No Known Allergies    Vital Signs Last 24 Hrs  T(C): 36.6 (22 Apr 2025 05:22), Max: 36.9 (21 Apr 2025 20:13)  T(F): 97.8 (22 Apr 2025 05:22), Max: 98.5 (21 Apr 2025 20:13)  HR: 87 (22 Apr 2025 05:22) (83 - 97)  BP: 133/67 (22 Apr 2025 05:22) (122/47 - 133/67)  BP(mean): 70 (21 Apr 2025 14:51) (70 - 70)  RR: 17 (22 Apr 2025 05:22) (16 - 18)  SpO2: 97% (22 Apr 2025 05:22) (97% - 100%)    Parameters below as of 22 Apr 2025 05:22  Patient On (Oxygen Delivery Method): room air      I&O's Detail    21 Apr 2025 07:01  -  22 Apr 2025 07:00  --------------------------------------------------------  IN:    sodium chloride 0.9%: 660 mL  Total IN: 660 mL    OUT:    Voided (mL): 500 mL  Total OUT: 500 mL    Total NET: 160 mL    Physical Exam:  Constitutional: Thin, elderly F in NAD  Respiratory: essentially CTA  Cardiovascular: normal S1, S2  Gastrointestinal: soft, ND, NT, + BS  Genitourinary: Radford-leg bag with clear beulah urine  Extremities: LLE with large lat calf necrotic eschar without any periwound erythema or fluctuance. Large distal calf/dorsal foot necrotic eschar without any erythema. Left lat malleolar dry necrotic eschar with periwound erythema without fluctuance. L heel dry necrotic eschar. L foot sensory and motor abnormalities at baseline with bilat drop foot  Neurological: A&O x 3  Pulses:   Right:                                                                          Left:  FEM [x]2+ [ ]1+ [ ]doppler                                             FEM [x ]2+ [ ]1+ [ ]doppler    POP [ ]2+ [ x]1+ [ ]doppler                                             POP [ ]2+ [ ]1+ [ ]doppler    DP [x ]2+ [ ]1+ [ ]doppler                                                DP [ ]2+ [ ]1+ [ ]doppler  PT[ ]2+ [x ]1+ [ ]doppler                                                  PT [ ]2+ [ ]1+ [x ]doppler      LABS:                        10.4   15.92 )-----------( 415      ( 21 Apr 2025 09:15 )             32.7     04-21    138  |  100  |  21  ----------------------------<  410[H]  5.0   |  21[L]  |  0.76    Ca    9.8      21 Apr 2025 09:15  Mg     2.2     04-21    TPro  7.0  /  Alb  2.6[L]  /  TBili  0.9  /  DBili  x   /  AST  27  /  ALT  12  /  AlkPhos  114  04-21      CAPILLARY BLOOD GLUCOSE  POCT Blood Glucose.: 397 mg/dL (22 Apr 2025 08:24)  POCT Blood Glucose.: 353 mg/dL (21 Apr 2025 21:22)  POCT Blood Glucose.: 385 mg/dL (21 Apr 2025 17:15)  POCT Blood Glucose.: 483 mg/dL (21 Apr 2025 13:30)  POCT Blood Glucose.: 495 mg/dL (21 Apr 2025 13:24)  POCT Blood Glucose.: 522 mg/dL (21 Apr 2025 12:44)  POCT Blood Glucose.: 556 mg/dL (21 Apr 2025 12:42)  POCT Blood Glucose.: 510 mg/dL (21 Apr 2025 11:28)  POCT Blood Glucose.: 526 mg/dL (21 Apr 2025 11:24)    Radiology and Additional Studies:    < from: [facility] Duplex Lower Ext Vein Scan, Bilat (04.21.25 @ 14:14) >    ACC: [id number] EXAM:  DUPLEX SCAN EXT VEINS LOWER BI   ORDERED BY: JAYCE HOBSON     PROCEDURE DATE:  04/21/2025          INTERPRETATION:  CLINICAL INFORMATION: Rule out DVT.    COMPARISON: None available.    TECHNIQUE: Duplex sonography of the BILATERAL LOWER extremity veins with   color and spectral Doppler, with and without compression.    FINDINGS:    RIGHT:  Normal compressibility of the RIGHT common femoral, femoral and popliteal   veins.  Doppler examination shows normal spontaneousand phasic flow.  No RIGHT posterior tibial or peroneal vein thrombosis is detected.  The   soleal and gastrocnemius veins were not visualized.    LEFT:  Normal compressibility of the LEFT common femoral, femoral and popliteal   veins.  Doppler examination shows normal spontaneous and phasic flow.  No LEFT posterior tibial peroneal vein thrombosis is detected. The soleal   and gastrocnemius veins were not visualized.    IMPRESSION:  No evidence of deep venous thrombosis in the visualized deep veins of   either lower extremity noted above.    < end of copied text >

## 2025-04-22 NOTE — CONSULT NOTE ADULT - CONVERSATION DETAILS
Outreach made to patient spouse, Trevon. Spouse home sick with covid and asked if I can speak with his son, Thanh.  Outreach made to Thanh on this date to discuss GOC. Introduced self and role, reviewed clinical status.  Son states they are aware of severity of issue, but wouldn't want amputation. He states they want to give her a chance to go to surgery and try to restore blood flow, but if its not possible, they wouldn't want to pursue a surgery including limb loss.  I explained that the reason for our conversation is to have a thoughtful discussion about this, given concern by vascular team that this is a high risk for that to happen, and how we should proceed, or not proceed with patients medical care.  I discussed alternatives to surgery including prioritize patients comfort vs. using PRN antibiotics but understanding that there will be a limit to how effective that would be given no source control or intervention to actually eliminate the problem.   Son states he is aware, and is interested in conversation further, once he speaks with vascular team. Outreach made to patient spouse, Trevon. Spouse home sick with covid and asked if I can speak with his son, Thanh.  Outreach made to Thanh on this date to discuss GOC. Introduced self and role, reviewed clinical status.  Son states they are aware of severity of issue, but wouldn't want amputation. He states they want to give her a chance to go to surgery and try to restore blood flow, but if its not possible, they wouldn't want to pursue a surgery including limb loss.  I explained that the reason for our conversation is to have a thoughtful discussion about this, given concern by vascular team that this is a high risk for that to happen, and how we should proceed, or not proceed with patients medical care.  I discussed alternatives to surgery including prioritize patients comfort (hospice) vs. using PRN antibiotics but understanding that there will be a limit to how effective that would be given no source control or intervention to actually eliminate the problem.   Son states he is aware, and is interested in conversation further, once he speaks with vascular team.    discussed advance directives; son confirms wishes for DNR/DNI status

## 2025-04-22 NOTE — CONSULT NOTE ADULT - TREATMENT GUIDELINE COMMENT
family awaiting input from vascular team re: what options surgically exist.  wouldn't want amputation, but do want attempt at vascular intervention if possible

## 2025-04-22 NOTE — CONSULT NOTE ADULT - PROBLEM SELECTOR RECOMMENDATION 5
will continue to follow for ongoing goc pending vascular discussion with family to understand what is vs. isnt feasible from surgical standpoint.    Nikki Jose MD, SURES[facility]-C; Palliative Care Attending, [phone]

## 2025-04-22 NOTE — DIETITIAN INITIAL EVALUATION ADULT - PHYSCIAL ASSESSMENT
Physical exam deferred, however, signs of mod. temple wasting visually observed Due to AMS and COVID positive, Physical exam deferred. However, signs of mod. temple wasting visually observed. Suspect moderate malnutrition. Due to AMS and COVID positive, Physical exam deferred. However, signs of mod. temple wasting visually observed. Suspect moderate malnutrition./debilitated

## 2025-04-22 NOTE — DIETITIAN INITIAL EVALUATION ADULT - OTHER INFO
74-year-old white female with PMH HTN, HLD, diabetes mellitus on insulin and PAD S/P multiple LLE angios with L SFA and pop in stentocclusion with vascular ulcers/gangrene left lower extremity. Presented for elective LLE angiogram and was noted with elevated WBC and glucose and sent to ED for medical optimization prior to surgery.  Significant wounds to LLE with sensory and motor dysfunction at baseline ? from [place] 74-year-old white female with PMH HTN, HLD, diabetes mellitus on insulin and PAD S/P multiple LLE angios with L SFA and pop in stentocclusion with vascular ulcers/gangrene left lower extremity. Presented for elective LLE angiogram and was noted with elevated WBC and glucose and sent to ED for medical optimization prior to surgery. + Significant wounds to LLE .Pt.with sensory and motor dysfunction at baseline ? from [place] . COVID+ on isolation; no acute surgical intervention at this time. Pt at high risk for limb loss  74-year-old white female with PMH HTN, HLD, diabetes mellitus and PAD S/P multiple LLE angios with L SFA and pop in stentocclusion with vascular ulcers/gangrene left lower extremity. Presented for elective LLE angiogram and was noted with elevated WBC and glucose and sent to ED for medical optimization prior to surgery. COVID+ on isolation; no acute surgical intervention at this time. Pt at high risk for limb loss.    RD visited pt at bedside this morning. Pt alert but confused at times. Pt reports has dentures. As per nursing ate only some of her breakfast. Pt with hx of DM with CGM. Pt currently on short-acting and long-acting insulin during admission and at home, however, severely hyperglycemic during admission. A1C results pending.  74-year-old white female with PMH HTN, HLD, diabetes mellitus, and PAD S/P multiple LLE angios with vascular ulcers/gangrene left lower extremity. Presented for elective LLE angiogram and was noted with elevated WBC and glucose and sent to ED for medical optimization prior to surgery. COVID+ on isolation; no acute surgical intervention at this time. Pt at high risk for limb loss.    RD visited pt at bedside this morning. Pt confused at times. States appetite has been ok. As per nursing, son fed pt breakfast this morning; only ate some. Pt reports she has dentures. Pt with hx of DM with CGM. Pt currently on short-acting and long-acting insulin during admission and at home, however, severely hyperglycemic during admission. A1C results pending.

## 2025-04-22 NOTE — DIETITIAN INITIAL EVALUATION ADULT - NSFNSPHYEXAMSKINFT_GEN_A_CORE
Pressure Injury 1: Sacrum , Unstageable, Allevyn   Pressure Injury 2: none, none  Pressure Injury 3: none, none  Pressure Injury 4: none, none  Pressure Injury 5: none, none  Pressure Injury 6: none, none  Pressure Injury 7: none, none  Pressure Injury 8: none, none  Pressure Injury 9: none, none  Pressure Injury 10: none, none  Pressure Injury 11: none, none, Pressure Injury 1: sacrum, Unstageable  Pressure Injury 2: none, none  Pressure Injury 3: none, none  Pressure Injury 4: none, none  Pressure Injury 5: none, none  Pressure Injury 6: none, none  Pressure Injury 7: none, none  Pressure Injury 8: none, none  Pressure Injury 9: none, none  Pressure Injury 10: none, none  Pressure Injury 11: none, none Pressure Injury 1: Sacrum , Unstageable   Pressure Injury 1: Sacrum , Unstageable  Gangrene of L lower extremity

## 2025-04-22 NOTE — CAREGIVER ENGAGEMENT NOTE - CAREGIVER OUTREACH NOTES - FREE TEXT
CHASE spoke with pt's  rTevon on the phone. Trevon states that he unsure if he wishes for the pt to return back to [place], but is looking into placement at different [facility] facilities. Trevon mentioned [facility], but requested to speak further about facilities tomorrow morning after he speaks with his son.. Pt will need a 3-night stay prior to d/c, CHASE to follow up with Trevon tomorrow morning for choices. SWI to remain available for any needs. CHASE spoke with pt's  Trevon on the phone. Trevon states that he unsure if he wishes for the pt to return back to [place] but is looking into placement at different [facility] facilities. Trevon mentioned [facility], but requested to speak further about facilities tomorrow morning after he speaks with his son. Pt will need a 3-night stay prior to d/c, CHASE to follow up with Trevon tomorrow morning for choices. SWI to remain available for any needs. show

## 2025-04-22 NOTE — DIETITIAN INITIAL EVALUATION ADULT - PERTINENT MEDS FT
MEDICATIONS  (STANDING):  amLODIPine   Tablet 10 milliGRAM(s) Oral daily  apixaban 5 milliGRAM(s) Oral every 12 hours  aspirin  chewable 81 milliGRAM(s) Oral daily  atorvastatin 20 milliGRAM(s) Oral at bedtime  dextrose 5%. 1000 milliLiter(s) (50 mL/Hr) IV Continuous <Continuous>  dextrose 5%. 1000 milliLiter(s) (100 mL/Hr) IV Continuous <Continuous>  dextrose 50% Injectable 25 Gram(s) IV Push once  dextrose 50% Injectable 12.5 Gram(s) IV Push once  dextrose 50% Injectable 25 Gram(s) IV Push once  donepezil 5 milliGRAM(s) Oral at bedtime  furosemide    Tablet 20 milliGRAM(s) Oral daily  glucagon  Injectable 1 milliGRAM(s) IntraMuscular once  insulin glargine Injectable (LANTUS) 15 Unit(s) SubCutaneous before breakfast  insulin lispro (ADMELOG) corrective regimen sliding scale   SubCutaneous three times a day before meals  insulin lispro (ADMELOG) corrective regimen sliding scale   SubCutaneous at bedtime  insulin lispro Injectable (ADMELOG) 5 Unit(s) SubCutaneous three times a day before meals  lisinopril 40 milliGRAM(s) Oral daily  piperacillin/tazobactam IVPB.. 3.375 Gram(s) IV Intermittent every 8 hours  sodium chloride 0.9%. 1000 milliLiter(s) (55 mL/Hr) IV Continuous <Continuous>    MEDICATIONS  (PRN):  acetaminophen     Tablet .. 650 milliGRAM(s) Oral every 6 hours PRN Temp greater or equal to 38C (100.4F), Mild Pain (1 - 3)  dextrose Oral Gel 15 Gram(s) Oral once PRN Blood Glucose LESS THAN 70 milliGRAM(s)/deciliter  melatonin 3 milliGRAM(s) Oral at bedtime PRN Insomnia  ondansetron Injectable 4 milliGRAM(s) IV Push every 6 hours PRN Nausea and/or Vomiting

## 2025-04-22 NOTE — PROGRESS NOTE ADULT - SUBJECTIVE AND OBJECTIVE BOX
[facility]  INFECTIOUS DISEASES   [address] [place] [address]  Tel: [phone]     Fax: [phone]  ========================================================  MD Arthur Bell Michelle, MD Shah, Kaushal, MD Sunjit, Jaspal, MD Sehrish Shahid, MD   ========================================================    [id number]  TERESA MCHUGH     CC: Patient is a 74y old  Female who presents with a chief complaint of Hyperglycemia (21 Apr 2025 12:55)    Follow up: Patient looks comfortable, lying in bed. Not on supplemental o2. No fever.     PAST MEDICAL & SURGICAL HISTORY:  DM (diabetes mellitus)  HTN (hypertension)  No significant past surgical history    Social Hx: No current smoking, EtOH or drugs     FAMILY HISTORY:  Noncontributory     Allergies  No Known Allergies    MEDICATIONS  (STANDING):  amLODIPine   Tablet 10 milliGRAM(s) Oral daily  apixaban 5 milliGRAM(s) Oral every 12 hours  aspirin  chewable 81 milliGRAM(s) Oral daily  atorvastatin 20 milliGRAM(s) Oral at bedtime  dextrose 5%. 1000 milliLiter(s) (50 mL/Hr) IV Continuous <Continuous>  dextrose 5%. 1000 milliLiter(s) (100 mL/Hr) IV Continuous <Continuous>  dextrose 50% Injectable 25 Gram(s) IV Push once  dextrose 50% Injectable 12.5 Gram(s) IV Push once  dextrose 50% Injectable 25 Gram(s) IV Push once  donepezil 5 milliGRAM(s) Oral at bedtime  furosemide    Tablet 20 milliGRAM(s) Oral daily  glucagon  Injectable 1 milliGRAM(s) IntraMuscular once  insulin glargine Injectable (LANTUS) 15 Unit(s) SubCutaneous before breakfast  insulin lispro (ADMELOG) corrective regimen sliding scale   SubCutaneous three times a day before meals  insulin lispro (ADMELOG) corrective regimen sliding scale   SubCutaneous at bedtime  insulin lispro Injectable (ADMELOG) 5 Unit(s) SubCutaneous three times a day before meals  lisinopril 40 milliGRAM(s) Oral daily  piperacillin/tazobactam IVPB.. 3.375 Gram(s) IV Intermittent every 8 hours  sodium chloride 0.9%. 1000 milliLiter(s) (55 mL/Hr) IV Continuous <Continuous>      REVIEW OF SYSTEMS:  REVIEW OF SYSTEMS:  CONSTITUTIONAL:  No Fever or chills  HEENT:   No diplopia or blurred vision.  No earache, sore throat or runny nose.  CARDIOVASCULAR:  No chest pain or SOB  RESPIRATORY:  No cough, shortness of breath  GASTROINTESTINAL:  No nausea, vomiting or diarrhea.  GENITOURINARY:  No dysuria, frequency or urgency.   MUSCULOSKELETAL:  no joint aches, no muscle pain  SKIN:  leg wound    Physical Exam:  Vital Signs Last 24 Hrs  T(C): 36.6 (21 Apr 2025 14:51), Max: 36.6 (21 Apr 2025 14:51)  T(F): 97.9 (21 Apr 2025 14:51), Max: 97.9 (21 Apr 2025 14:51)  HR: 89 (21 Apr 2025 14:51) (86 - 90)  BP: 122/50 (21 Apr 2025 14:51) (119/65 - 122/50)  BP(mean): 70 (21 Apr 2025 14:51) (70 - 70)  RR: 18 (21 Apr 2025 14:51) (16 - 18)  SpO2: 100% (21 Apr 2025 14:51) (100% - 100%)  Parameters below as of 21 Apr 2025 14:51  Patient On (Oxygen Delivery Method): room air  Height (cm): 160 (04-21 @ 08:49)  Weight (kg): 56.7 (04-21 @ 08:49)  BMI (kg/m2): 22.1 (04-21 @ 08:49)  BSA (m2): 1.58 (04-21 @ 08:49)  GEN: NAD  HEENT: normocephalic and atraumatic. EOMI. PERRL.    NECK: Supple.  No lymphadenopathy   LUNGS: Clear to auscultation.  HEART: Regular rate and rhythm   ABDOMEN: Soft, nontender, and nondistended.   EXTREMITIES: Without edema.  NEUROLOGIC: grossly intact.  PSYCHIATRIC: Appropriate affect .  SKIN: LLE with 2 areas of gangrenous ulcers, one on ankle on dorsal side and   larger one in posterolateral side of leg, some discharge under dry eschar      Labs:                        8.5    13.79 )-----------( 355      ( 22 Apr 2025 10:01 )             27.2     04-22    137  |  102  |  17  ----------------------------<  380[H]  4.4   |  18[L]  |  0.79    Ca    9.2      22 Apr 2025 10:01  Mg     2.2     04-21    TPro  6.0  /  Alb  2.2[L]  /  TBili  0.5  /  DBili  x   /  AST  12[L]  /  ALT  10[L]  /  AlkPhos  99  04-22    WBC Count: 13.79 K/uL (04-22-25 @ 10:01)  WBC Count: 15.92 K/uL (04-21-25 @ 09:15)    Creatinine: 0.79 mg/dL (04-22-25 @ 10:01)  Creatinine: 0.76 mg/dL (04-21-25 @ 09:15)    C-Reactive Protein: 36 mg/L (04-21-25 @ 09:15)    Sedimentation Rate, Erythrocyte: 70 mm/hr (04-21-25 @ 09:15)    COVID-19 PCR: Detected (04-21-25 @ 23:50)    All imaging and other data have been reviewed.  < from: VA Duplex Lower Ext Vein Scan, Bilat (04.21.25 @ 14:14) >  IMPRESSION:  No evidence of deep venous thrombosis in the visualized deep veins of   either lower extremity noted above.    < from: Xray Chest 1 View- PORTABLE-Urgent (04.21.25 @ 09:33) >  IMPRESSION:  No acute infiltrate.    Assessment and Plan:   73yo woman with PMH of HTN and DM2, presently residing in rehab center had developed vascular ulcers/gangrene left lower extremity a and she was evaluated as outpatient by vascular surgeon scheduled for surgery today and because of glucose being out of control was sent to the emergency department here at [facility] for further evaluation, treatment and management as well as stabilization of patient from a metabolic and glucose standpoint. No recent fever chills nausea vomiting or diarrhea.  She had leukocytosis of 15k in ED but no fever.   ESR 70 and CRP 36     Yesterday she was positive for COVID but not on o2 and CXR negative for pneumonia. Unclear when had covid but since asymptomatic can be watched.     # Vascular ulcers in LLE  # Uncontrolled Diabetes mellitus   # COVID    - Blood Cultures NGTD  - Wound culture pending   - Monitor WBC 15-->12  - Vascular and wound care follow up   - Continue zosyn for now  - MRSA PCR   - For COVID will watch for now, no need for remdesivir or steroid at this time   - AC as per protocol     Will follow.    Gregory Huitron MD  Division of Infectious Diseases   Please call ID service at [phone] with any question.    50 minutes spent on total encounter assessing patient, examination, chart review, counseling and coordinating care by the attending physician/nurse/care manager.

## 2025-04-22 NOTE — CONSULT NOTE ADULT - PROBLEM SELECTOR RECOMMENDATION 9
cont lantus 15 units qam  cont mod dose admelog corrective scale coverage qac/qhs  cont cons cho diet  add admelog 5 units 3x/day before meals  goal bg 100-180 in hosp setting
Josselin Leblanc, offloading.  For left leg gangrene as per vascular surgery.
Type 1 A1c pending% adm T1Dm  pt F/S >350, +ketones urine 80  pt received 15 units lantus, 15 units admelog this AM  recc 1L IVF bolus, pending BMP  Recommend endocrine-Perlman onconsult  FU appt: TBA  DSC recommendations: return to home regimen and glucose monitoring  diabetes education provided as documented above  Diabetes support info and cell # [phone] given   Goal 100-180 mg/dL; 140-180 mg/dL in critical care areas
c/w local wound care  per vascular surgery, was planned for surgery until uncontrolled DM, however, on evaluation, concern for limb loss and wanted GOC discussion before proceeding with surgery.    discussed with son, they wouldn't want amputation but are hoping for some intervention to see if procedure can be done to restore blood flow without removing limb. Son awaiting to speak with vascular surgery

## 2025-04-22 NOTE — CONSULT NOTE ADULT - PROBLEM SELECTOR RECOMMENDATION 2
advanced, debilitated  family aware of clinical status and understand prognosis overall is not idea  reviewed option for comfort directed care vs. use of abx rather than surgery for above problem #1    patient does not have decision making capacity on my evaluation.

## 2025-04-22 NOTE — DIETITIAN INITIAL EVALUATION ADULT - SIGNS/SYMPTOMS
As evidenced by PI sacrum, unstageable  As evidenced by blood glucose 291-397mg/dL today As evidenced by PI sacrum, unstageable and gangrenous LLE

## 2025-04-22 NOTE — DIETITIAN INITIAL EVALUATION ADULT - PERTINENT LABORATORY DATA
04-21    138  |  100  |  21  ----------------------------<  410[H]  5.0   |  21[L]  |  0.76    Ca    9.8      21 Apr 2025 09:15  Mg     2.2     04-21    TPro  7.0  /  Alb  2.6[L]  /  TBili  0.9  /  DBili  x   /  AST  27  /  ALT  12  /  AlkPhos  114  04-21  POCT Blood Glucose.: 397 mg/dL (04-22-25 @ 08:24)

## 2025-04-22 NOTE — CONSULT NOTE ADULT - PROBLEM SELECTOR PROBLEM 1
DM (diabetes mellitus)
Pressure ulcer of sacral region, stage 2
Type 1 diabetes mellitus with hyperglycemia
Wound of foot

## 2025-04-23 LAB
-  AZTREONAM: SIGNIFICANT CHANGE UP
-  CEFEPIME: SIGNIFICANT CHANGE UP
-  CEFTAZIDIME: SIGNIFICANT CHANGE UP
-  CIPROFLOXACIN: SIGNIFICANT CHANGE UP
-  IMIPENEM: SIGNIFICANT CHANGE UP
-  LEVOFLOXACIN: SIGNIFICANT CHANGE UP
-  MEROPENEM: SIGNIFICANT CHANGE UP
-  PIPERACILLIN/TAZOBACTAM: SIGNIFICANT CHANGE UP
ANION GAP SERPL CALC-SCNC: 11 MMOL/L — SIGNIFICANT CHANGE UP (ref 5–17)
ANION GAP SERPL CALC-SCNC: 8 MMOL/L — SIGNIFICANT CHANGE UP (ref 5–17)
BASOPHILS # BLD AUTO: 0 K/UL — SIGNIFICANT CHANGE UP (ref 0–0.2)
BASOPHILS NFR BLD AUTO: 0 % — SIGNIFICANT CHANGE UP (ref 0–2)
BUN SERPL-MCNC: 7 MG/DL — SIGNIFICANT CHANGE UP (ref 7–23)
BUN SERPL-MCNC: 8 MG/DL — SIGNIFICANT CHANGE UP (ref 7–23)
CALCIUM SERPL-MCNC: 8.7 MG/DL — SIGNIFICANT CHANGE UP (ref 8.5–10.1)
CALCIUM SERPL-MCNC: 8.9 MG/DL — SIGNIFICANT CHANGE UP (ref 8.5–10.1)
CHLORIDE SERPL-SCNC: 102 MMOL/L — SIGNIFICANT CHANGE UP (ref 96–108)
CHLORIDE SERPL-SCNC: 99 MMOL/L — SIGNIFICANT CHANGE UP (ref 96–108)
CO2 SERPL-SCNC: 28 MMOL/L — SIGNIFICANT CHANGE UP (ref 22–31)
CO2 SERPL-SCNC: 33 MMOL/L — HIGH (ref 22–31)
CREAT SERPL-MCNC: 0.56 MG/DL — SIGNIFICANT CHANGE UP (ref 0.5–1.3)
CREAT SERPL-MCNC: 0.67 MG/DL — SIGNIFICANT CHANGE UP (ref 0.5–1.3)
EGFR: 91 ML/MIN/1.73M2 — SIGNIFICANT CHANGE UP
EGFR: 91 ML/MIN/1.73M2 — SIGNIFICANT CHANGE UP
EGFR: 95 ML/MIN/1.73M2 — SIGNIFICANT CHANGE UP
EGFR: 95 ML/MIN/1.73M2 — SIGNIFICANT CHANGE UP
EOSINOPHIL # BLD AUTO: 0 K/UL — SIGNIFICANT CHANGE UP (ref 0–0.5)
EOSINOPHIL NFR BLD AUTO: 0 % — SIGNIFICANT CHANGE UP (ref 0–6)
ERYTHROCYTE [SEDIMENTATION RATE] IN BLOOD: 65 MM/HR — HIGH (ref 0–20)
GLUCOSE SERPL-MCNC: 111 MG/DL — HIGH (ref 70–99)
GLUCOSE SERPL-MCNC: 234 MG/DL — HIGH (ref 70–99)
HCT VFR BLD CALC: 28.2 % — LOW (ref 34.5–45)
HGB BLD-MCNC: 9.1 G/DL — LOW (ref 11.5–15.5)
LACTATE SERPL-SCNC: 1.2 MMOL/L — SIGNIFICANT CHANGE UP (ref 0.7–2)
LYMPHOCYTES # BLD AUTO: 0.3 K/UL — LOW (ref 1–3.3)
LYMPHOCYTES # BLD AUTO: 2 % — LOW (ref 13–44)
MAGNESIUM SERPL-MCNC: 1.5 MG/DL — LOW (ref 1.6–2.6)
MCHC RBC-ENTMCNC: 29.2 PG — SIGNIFICANT CHANGE UP (ref 27–34)
MCHC RBC-ENTMCNC: 32.3 G/DL — SIGNIFICANT CHANGE UP (ref 32–36)
MCV RBC AUTO: 90.4 FL — SIGNIFICANT CHANGE UP (ref 80–100)
METHOD TYPE: SIGNIFICANT CHANGE UP
MONOCYTES # BLD AUTO: 1.21 K/UL — HIGH (ref 0–0.9)
MONOCYTES NFR BLD AUTO: 8 % — SIGNIFICANT CHANGE UP (ref 2–14)
NEUTROPHILS # BLD AUTO: 13.66 K/UL — HIGH (ref 1.8–7.4)
NEUTROPHILS NFR BLD AUTO: 85 % — HIGH (ref 43–77)
NRBC BLD AUTO-RTO: SIGNIFICANT CHANGE UP /100 WBCS (ref 0–0)
PHOSPHATE SERPL-MCNC: 2.1 MG/DL — LOW (ref 2.5–4.5)
PLATELET # BLD AUTO: 330 K/UL — SIGNIFICANT CHANGE UP (ref 150–400)
POTASSIUM SERPL-MCNC: 2.6 MMOL/L — CRITICAL LOW (ref 3.5–5.3)
POTASSIUM SERPL-MCNC: 3.4 MMOL/L — LOW (ref 3.5–5.3)
POTASSIUM SERPL-SCNC: 2.6 MMOL/L — CRITICAL LOW (ref 3.5–5.3)
POTASSIUM SERPL-SCNC: 3.4 MMOL/L — LOW (ref 3.5–5.3)
PREALB SERPL-MCNC: 9 MG/DL — LOW (ref 20–40)
RBC # BLD: 3.12 M/UL — LOW (ref 3.8–5.2)
RBC # FLD: 14.3 % — SIGNIFICANT CHANGE UP (ref 10.3–14.5)
SODIUM SERPL-SCNC: 140 MMOL/L — SIGNIFICANT CHANGE UP (ref 135–145)
SODIUM SERPL-SCNC: 141 MMOL/L — SIGNIFICANT CHANGE UP (ref 135–145)
WBC # BLD: 15.18 K/UL — HIGH (ref 3.8–10.5)
WBC # FLD AUTO: 15.18 K/UL — HIGH (ref 3.8–10.5)

## 2025-04-23 PROCEDURE — 99233 SBSQ HOSP IP/OBS HIGH 50: CPT

## 2025-04-23 PROCEDURE — 99232 SBSQ HOSP IP/OBS MODERATE 35: CPT

## 2025-04-23 PROCEDURE — 93010 ELECTROCARDIOGRAM REPORT: CPT

## 2025-04-23 PROCEDURE — 93010 ELECTROCARDIOGRAM REPORT: CPT | Mod: 77

## 2025-04-23 PROCEDURE — G0545: CPT

## 2025-04-23 RX ORDER — MAGNESIUM SULFATE 500 MG/ML
2 SYRINGE (ML) INJECTION ONCE
Refills: 0 | Status: COMPLETED | OUTPATIENT
Start: 2025-04-23 | End: 2025-04-23

## 2025-04-23 RX ORDER — METOCLOPRAMIDE HCL 10 MG
10 TABLET ORAL ONCE
Refills: 0 | Status: COMPLETED | OUTPATIENT
Start: 2025-04-23 | End: 2025-04-23

## 2025-04-23 RX ORDER — METOPROLOL SUCCINATE 50 MG/1
25 TABLET, EXTENDED RELEASE ORAL
Refills: 0 | Status: COMPLETED | OUTPATIENT
Start: 2025-04-23 | End: 2025-04-24

## 2025-04-23 RX ORDER — INSULIN LISPRO 100 U/ML
INJECTION, SOLUTION INTRAVENOUS; SUBCUTANEOUS
Refills: 0 | Status: DISCONTINUED | OUTPATIENT
Start: 2025-04-23 | End: 2025-04-30

## 2025-04-23 RX ORDER — INSULIN LISPRO 100 U/ML
INJECTION, SOLUTION INTRAVENOUS; SUBCUTANEOUS AT BEDTIME
Refills: 0 | Status: DISCONTINUED | OUTPATIENT
Start: 2025-04-23 | End: 2025-04-30

## 2025-04-23 RX ORDER — SOD PHOS DI, MONO/K PHOS MONO 250 MG
1 TABLET ORAL ONCE
Refills: 0 | Status: COMPLETED | OUTPATIENT
Start: 2025-04-23 | End: 2025-04-23

## 2025-04-23 RX ADMIN — Medication 100 MILLIEQUIVALENT(S): at 14:09

## 2025-04-23 RX ADMIN — Medication 4 MILLIGRAM(S): at 23:03

## 2025-04-23 RX ADMIN — SODIUM CHLORIDE 150 MILLILITER(S): 9 INJECTION, SOLUTION INTRAVENOUS at 01:46

## 2025-04-23 RX ADMIN — LISINOPRIL 40 MILLIGRAM(S): 5 TABLET ORAL at 05:30

## 2025-04-23 RX ADMIN — Medication 650 MILLIGRAM(S): at 05:24

## 2025-04-23 RX ADMIN — Medication 25 GRAM(S): at 01:46

## 2025-04-23 RX ADMIN — INSULIN LISPRO 5 UNIT(S): 100 INJECTION, SOLUTION INTRAVENOUS; SUBCUTANEOUS at 09:07

## 2025-04-23 RX ADMIN — Medication 40 MILLIEQUIVALENT(S): at 09:11

## 2025-04-23 RX ADMIN — INSULIN GLARGINE-YFGN 15 UNIT(S): 100 INJECTION, SOLUTION SUBCUTANEOUS at 09:08

## 2025-04-23 RX ADMIN — AMLODIPINE BESYLATE 10 MILLIGRAM(S): 10 TABLET ORAL at 05:24

## 2025-04-23 RX ADMIN — Medication 650 MILLIGRAM(S): at 06:35

## 2025-04-23 RX ADMIN — Medication 1 PACKET(S): at 11:20

## 2025-04-23 RX ADMIN — APIXABAN 5 MILLIGRAM(S): 2.5 TABLET, FILM COATED ORAL at 17:30

## 2025-04-23 RX ADMIN — Medication 25 GRAM(S): at 17:30

## 2025-04-23 RX ADMIN — Medication 10 MILLIGRAM(S): at 18:56

## 2025-04-23 RX ADMIN — Medication 40 MILLIEQUIVALENT(S): at 23:04

## 2025-04-23 RX ADMIN — INSULIN LISPRO 4: 100 INJECTION, SOLUTION INTRAVENOUS; SUBCUTANEOUS at 09:07

## 2025-04-23 RX ADMIN — Medication 100 MILLIEQUIVALENT(S): at 11:21

## 2025-04-23 RX ADMIN — Medication 650 MILLIGRAM(S): at 13:24

## 2025-04-23 RX ADMIN — Medication 40 MILLIEQUIVALENT(S): at 11:32

## 2025-04-23 RX ADMIN — FUROSEMIDE 20 MILLIGRAM(S): 10 INJECTION INTRAMUSCULAR; INTRAVENOUS at 05:24

## 2025-04-23 RX ADMIN — Medication 25 GRAM(S): at 11:21

## 2025-04-23 RX ADMIN — Medication 25 GRAM(S): at 09:11

## 2025-04-23 RX ADMIN — Medication 40 MILLIEQUIVALENT(S): at 17:55

## 2025-04-23 RX ADMIN — Medication 81 MILLIGRAM(S): at 11:21

## 2025-04-23 RX ADMIN — SODIUM CHLORIDE 150 MILLILITER(S): 9 INJECTION, SOLUTION INTRAVENOUS at 13:25

## 2025-04-23 RX ADMIN — DONEPEZIL HYDROCHLORIDE 5 MILLIGRAM(S): 5 TABLET ORAL at 23:04

## 2025-04-23 RX ADMIN — APIXABAN 5 MILLIGRAM(S): 2.5 TABLET, FILM COATED ORAL at 05:24

## 2025-04-23 RX ADMIN — Medication 4 MILLIGRAM(S): at 17:39

## 2025-04-23 RX ADMIN — Medication 100 MILLIEQUIVALENT(S): at 09:11

## 2025-04-23 RX ADMIN — ATORVASTATIN CALCIUM 20 MILLIGRAM(S): 80 TABLET, FILM COATED ORAL at 23:04

## 2025-04-23 RX ADMIN — Medication 4 MILLIGRAM(S): at 05:30

## 2025-04-23 NOTE — CASE MANAGEMENT PROGRESS NOTE - NSCMPROGRESSNOTE_GEN_ALL_CORE
CM noted consult due to patient is bedbound.  Discharge disposition is [facility]. SW involved. CM remains available throughout hospital stay.

## 2025-04-23 NOTE — CHART NOTE - NSCHARTNOTEFT_GEN_A_CORE
Called by RN stating pt has a fever of 101.1 and tachycardic to 150s. Pt is COVID+ w/ Vascular ulcers in LLE and Uncontrolled Diabetes mellitus.   -tynenol PO x1 given   -use ice packs   -tachycardia likely due to fever   -on zosyn   -f/u repeat HR and temp after medications   -Bcx NGTD   -day team to f/u for further ID recs Called by RN stating pt has a fever of 101.1 and tachycardic to 110-150s. Pt is COVID+ w/ Vascular ulcers in LLE and Uncontrolled Diabetes mellitus.   -tynenol PO x1 given   -use ice packs   -tachycardia likely due to fever   -on zosyn   -f/u repeat HR and temp after medications   -Bcx NGTD   -day team to f/u for further ID recs Called by RN stating pt has a fever of 101.1 and tachycardic to 110-150s. Pt is COVID+ w/ Vascular ulcers in LLE and Uncontrolled Diabetes mellitus.   -tynenol PO x1 given   -use ice packs   -tachycardia likely due to fever   -on zosyn   -repeat HR 140s and temp 100.8    -Bcx NGTD   -EKG ordered for presistent tachycardia -f/u likely still due to fever   -day team to f/u for further ID recs

## 2025-04-23 NOTE — PROGRESS NOTE ADULT - SUBJECTIVE AND OBJECTIVE BOX
CAPILLARY BLOOD GLUCOSE      POCT Blood Glucose.: 234 mg/dL (23 Apr 2025 08:16)  POCT Blood Glucose.: 242 mg/dL (23 Apr 2025 05:51)  POCT Blood Glucose.: 239 mg/dL (22 Apr 2025 21:47)  POCT Blood Glucose.: 87 mg/dL (22 Apr 2025 20:58)  POCT Blood Glucose.: 59 mg/dL (22 Apr 2025 20:31)  POCT Blood Glucose.: 54 mg/dL (22 Apr 2025 20:16)  POCT Blood Glucose.: 60 mg/dL (22 Apr 2025 20:14)  POCT Blood Glucose.: 92 mg/dL (22 Apr 2025 17:26)  POCT Blood Glucose.: 291 mg/dL (22 Apr 2025 12:05)      Vital Signs Last 24 Hrs  T(C): 37.9 (23 Apr 2025 06:31), Max: 38.4 (23 Apr 2025 05:30)  T(F): 100.2 (23 Apr 2025 06:31), Max: 101.1 (23 Apr 2025 05:30)  HR: 96 (23 Apr 2025 06:42) (79 - 163)  BP: 123/73 (23 Apr 2025 06:31) (115/65 - 150/80)  BP(mean): --  RR: 20 (23 Apr 2025 06:31) (18 - 20)  SpO2: 93% (23 Apr 2025 06:31) (93% - 99%)    Parameters below as of 23 Apr 2025 06:31  Patient On (Oxygen Delivery Method): room air        General: WN/WD NAD  Respiratory: CTA B/L  CV: RRR, S1S2, no murmurs, rubs or gallops  Abdominal: Soft, NT, ND +BS, Last BM  Extremities: No edema, + peripheral pulses     04-23    141  |  102  |  8   ----------------------------<  234[H]  2.6[LL]   |  28  |  0.56    Ca    8.9      23 Apr 2025 06:45  Phos  2.1     04-23  Mg     1.5     04-23    TPro  6.0  /  Alb  2.2[L]  /  TBili  0.5  /  DBili  x   /  AST  12[L]  /  ALT  10[L]  /  AlkPhos  99  04-22      atorvastatin 20 milliGRAM(s) Oral at bedtime  dextrose 50% Injectable 25 Gram(s) IV Push once  dextrose 50% Injectable 12.5 Gram(s) IV Push once  dextrose 50% Injectable 25 Gram(s) IV Push once  dextrose Oral Gel 15 Gram(s) Oral once PRN  dextrose Oral Gel 15 Gram(s) Oral once PRN  glucagon  Injectable 1 milliGRAM(s) IntraMuscular once  insulin glargine Injectable (LANTUS) 15 Unit(s) SubCutaneous before breakfast  insulin lispro (ADMELOG) corrective regimen sliding scale   SubCutaneous three times a day before meals  insulin lispro (ADMELOG) corrective regimen sliding scale   SubCutaneous at bedtime  insulin lispro Injectable (ADMELOG) 5 Unit(s) SubCutaneous three times a day before meals

## 2025-04-23 NOTE — PROGRESS NOTE ADULT - SUBJECTIVE AND OBJECTIVE BOX
[facility]  INFECTIOUS DISEASES   [address] [place] [address]  Tel: [phone]     Fax: [phone]  ========================================================  MD Arthur Bell Michelle, MD Shah, Kaushal, MD Sunjit, Jaspal, MD Sehrish Shahid, MD   ========================================================    [id number]  TERESA MCHUGH     CC: Patient is a 74y old  Female who presents with a chief complaint of Hyperglycemia (21 Apr 2025 12:55)    Follow up: Patient looks comfortable, lying in bed. Not on supplemental o2. Fever 101.1    PAST MEDICAL & SURGICAL HISTORY:  DM (diabetes mellitus)  HTN (hypertension)  No significant past surgical history    Social Hx: No current smoking, EtOH or drugs     FAMILY HISTORY:  Noncontributory     Allergies  No Known Allergies    MEDICATIONS  (STANDING):  amLODIPine   Tablet 10 milliGRAM(s) Oral daily  apixaban 5 milliGRAM(s) Oral every 12 hours  aspirin  chewable 81 milliGRAM(s) Oral daily  atorvastatin 20 milliGRAM(s) Oral at bedtime  dextrose 5%. 1000 milliLiter(s) (50 mL/Hr) IV Continuous <Continuous>  dextrose 5%. 1000 milliLiter(s) (100 mL/Hr) IV Continuous <Continuous>  dextrose 50% Injectable 25 Gram(s) IV Push once  dextrose 50% Injectable 12.5 Gram(s) IV Push once  dextrose 50% Injectable 25 Gram(s) IV Push once  donepezil 5 milliGRAM(s) Oral at bedtime  furosemide    Tablet 20 milliGRAM(s) Oral daily  glucagon  Injectable 1 milliGRAM(s) IntraMuscular once  insulin glargine Injectable (LANTUS) 15 Unit(s) SubCutaneous before breakfast  insulin lispro (ADMELOG) corrective regimen sliding scale   SubCutaneous three times a day before meals  insulin lispro (ADMELOG) corrective regimen sliding scale   SubCutaneous at bedtime  insulin lispro Injectable (ADMELOG) 5 Unit(s) SubCutaneous three times a day before meals  lisinopril 40 milliGRAM(s) Oral daily  piperacillin/tazobactam IVPB.. 3.375 Gram(s) IV Intermittent every 8 hours  sodium chloride 0.9%. 1000 milliLiter(s) (55 mL/Hr) IV Continuous <Continuous>      REVIEW OF SYSTEMS:  Limited  SKIN:  leg wound    Physical Exam:  Vital Signs Last 24 Hrs  T(C): 37.9 (23 Apr 2025 06:31), Max: 38.4 (23 Apr 2025 05:30)  T(F): 100.2 (23 Apr 2025 06:31), Max: 101.1 (23 Apr 2025 05:30)  HR: 96 (23 Apr 2025 06:42) (79 - 163)  BP: 123/73 (23 Apr 2025 06:31) (115/65 - 150/80)  BP(mean): --  RR: 20 (23 Apr 2025 06:31) (18 - 20)  SpO2: 93% (23 Apr 2025 06:31) (93% - 99%)  Parameters below as of 23 Apr 2025 06:31  Patient On (Oxygen Delivery Method): room air  GEN: NAD  HEENT: normocephalic and atraumatic. EOMI. PERRL.    NECK: Supple.  No lymphadenopathy   LUNGS: Clear to auscultation.  HEART: Regular rate and rhythm   ABDOMEN: Soft, nontender, and nondistended.   EXTREMITIES: Without edema.  NEUROLOGIC: grossly intact.  PSYCHIATRIC: Appropriate affect .  SKIN: LLE with 2 areas of gangrenous ulcers, one on ankle on dorsal side and   larger one in posterolateral side of leg, some discharge under dry eschar      Labs:                        9.1    15.18 )-----------( 330      ( 23 Apr 2025 06:45 )             28.2     04-23    141  |  102  |  8   ----------------------------<  234[H]  2.6[LL]   |  28  |  0.56    Ca    8.9      23 Apr 2025 06:45  Phos  2.1     04-23  Mg     1.5     04-23    TPro  6.0  /  Alb  2.2[L]  /  TBili  0.5  /  DBili  x   /  AST  12[L]  /  ALT  10[L]  /  AlkPhos  99  04-22    Culture - Wound Aerobic/Anaerobic (collected 04-21-25 @ 15:00)  Source: Swab Swab  Preliminary Report (04-22-25 @ 18:45):    Moderate Pseudomonas aeruginosa    Culture - Blood (collected 04-21-25 @ 09:15)  Source: Blood Blood-Peripheral  Preliminary Report (04-22-25 @ 16:01):    No growth at 24 hours    Culture - Blood (collected 04-21-25 @ 09:10)  Source: Blood Blood-Peripheral  Preliminary Report (04-22-25 @ 16:01):    No growth at 24 hours    WBC Count: 15.18 K/uL (04-23-25 @ 06:45)  WBC Count: 13.79 K/uL (04-22-25 @ 10:01)  WBC Count: 15.92 K/uL (04-21-25 @ 09:15)    Creatinine: 0.56 mg/dL (04-23-25 @ 06:45)  Creatinine: 0.79 mg/dL (04-22-25 @ 10:01)  Creatinine: 0.76 mg/dL (04-21-25 @ 09:15)    C-Reactive Protein: 36 mg/L (04-21-25 @ 09:15)    Sedimentation Rate, Erythrocyte: 65 mm/hr (04-23-25 @ 06:45)  Sedimentation Rate, Erythrocyte: 70 mm/hr (04-21-25 @ 09:15)    COVID-19 PCR: Detected (04-21-25 @ 23:50)    All imaging and other data have been reviewed.  < from: [facility] Duplex Lower Ext Vein Scan, Bilat (04.21.25 @ 14:14) >  IMPRESSION:  No evidence of deep venous thrombosis in the visualized deep veins of   either lower extremity noted above.    < from: Xray Chest 1 View- PORTABLE-Urgent (04.21.25 @ 09:33) >  IMPRESSION:  No acute infiltrate.    Assessment and Plan:   73yo woman with PMH of HTN and DM2, presently residing in rehab center had developed vascular ulcers/gangrene left lower extremity a and she was evaluated as outpatient by vascular surgeon scheduled for surgery today and because of glucose being out of control was sent to the emergency department here at [place] for further evaluation, treatment and management as well as stabilization of patient from a metabolic and glucose standpoint. No recent fever chills nausea vomiting or diarrhea.  She had leukocytosis of 15k in ED but no fever.   ESR 70 and CRP 36     She tested positive for COVID in ED even though no symptoms, not on o2 and CXR negative for pneumonia. Unclear when had covid but since asymptomatic can be watched.   Leg wound looks like burn wound, but also could be vascular with gangrene.      # Vascular ulcers in LLE  # Uncontrolled Diabetes mellitus   # COVID    - Blood Cultures NGTD  - Wound culture with pseudomonas will follow sensitivity   - Monitor WBC today 15  - Vascular and wound care follow up   - Continue zosyn for now  - MRSA PCR   - For COVID will watch for now, no need for remdesivir or steroid at this time   - AC as per protocol     Will follow.    Gregory Huitron MD  Division of Infectious Diseases   Please call ID service at [phone] with any question.    50 minutes spent on total encounter assessing patient, examination, chart review, counseling and coordinating care by the attending physician/nurse/care manager.

## 2025-04-23 NOTE — PROGRESS NOTE ADULT - ASSESSMENT
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 Found to have new onset atrial fibrillation with RVR.    Infected LLE Vascular Ulcers  Cellulitis/SSTI  Severe PAD  - wound cx with pseudomonas - continue zosyn  - sepsis POA (evolving) secondary to SSTI & COVID  - BCx negative. UCx with growth pending ID  - per vascular - no acute intervention - pt at risk for limb loss  - d/w son Thanh - states family would want attempt at revascularization and that vascular will reassess in next few days  - continue AC/aspirin + statin  - ID following Dr. Huitron  - Vascular following Dr. Jenise KERNS infection  - no role for remdesivir/decadron  - supportive care  - ID following    Atrial fibrillation with RVR  - Afib with RVR 4/23 AM likely reactive secondary to sepsis  - patient without documented history of afib although per outpatient review, had embolic CVA with suspected cardiogenic etiology - although never captured  - suspect patient likely with paroxysmal atrial fibrillation for some time now given history  - continue home apixaban  - will stop amlodipine & change to lopressor 25mg BID for rate control  - severe hypoK and hypoMg/hypoPhos - replete electrolytes aggressively to suppress arrhythmia  - check TTE (ordered)  - cardiology consult Dr. Hamm    DM1, uncontrolled  - A1C 7.8  - continue lantus 15u qAM  - continue premeal lispro 5u TID  - continue low dose insulin corrective scale  - monitor BG, hypoglycemia protocol    HTN  - continue lisinopril 40mg  - d/c amlodipine & change to metoprolol as above  - hold lasix for now in setting of sepsis  - monitor HDs    History of embolic CVA  - continue apixaban + statin    Alzheimer Dementia  - continue donepezil    Prophylactic Measure  - VTE ppx: apixaban    GOC: DNR/DNI

## 2025-04-23 NOTE — PROGRESS NOTE ADULT - ASSESSMENT
Physical Exam:   Vital Signs Last 24 Hrs  T(C): 37.9 (23 Apr 2025 06:31), Max: 38.4 (23 Apr 2025 05:30)  T(F): 100.2 (23 Apr 2025 06:31), Max: 101.1 (23 Apr 2025 05:30)  HR: 96 (23 Apr 2025 06:42) (79 - 163)  BP: 123/73 (23 Apr 2025 06:31) (115/65 - 150/80)  BP(mean): --  RR: 20 (23 Apr 2025 06:31) (18 - 20)  SpO2: 93% (23 Apr 2025 06:31) (93% - 99%)    Parameters below as of 23 Apr 2025 06:31  Patient On (Oxygen Delivery Method): room air       CAPILLARY BLOOD GLUCOSE      POCT Blood Glucose.: 234 mg/dL (23 Apr 2025 08:16)  POCT Blood Glucose.: 242 mg/dL (23 Apr 2025 05:51)  POCT Blood Glucose.: 239 mg/dL (22 Apr 2025 21:47)  POCT Blood Glucose.: 87 mg/dL (22 Apr 2025 20:58)  POCT Blood Glucose.: 59 mg/dL (22 Apr 2025 20:31)  POCT Blood Glucose.: 54 mg/dL (22 Apr 2025 20:16)  POCT Blood Glucose.: 60 mg/dL (22 Apr 2025 20:14)  POCT Blood Glucose.: 92 mg/dL (22 Apr 2025 17:26)  POCT Blood Glucose.: 291 mg/dL (22 Apr 2025 12:05)  POCT Blood Glucose.: 333 mg/dL (22 Apr 2025 10:10)        DIET: CC  <50%

## 2025-04-23 NOTE — PROGRESS NOTE ADULT - SUBJECTIVE AND OBJECTIVE BOX
Patient is a 74y old  Female who presents with a chief complaint of Hyperglycemia (23 Apr 2025 10:16)    Pt seen and examined. Unable to provide any meaningful information    MEDICATIONS  (STANDING):  amLODIPine   Tablet 10 milliGRAM(s) Oral daily  apixaban 5 milliGRAM(s) Oral every 12 hours  aspirin  chewable 81 milliGRAM(s) Oral daily  atorvastatin 20 milliGRAM(s) Oral at bedtime  dextrose 5%. 1000 milliLiter(s) (50 mL/Hr) IV Continuous <Continuous>  dextrose 5%. 1000 milliLiter(s) (100 mL/Hr) IV Continuous <Continuous>  dextrose 50% Injectable 25 Gram(s) IV Push once  dextrose 50% Injectable 12.5 Gram(s) IV Push once  dextrose 50% Injectable 25 Gram(s) IV Push once  donepezil 5 milliGRAM(s) Oral at bedtime  furosemide    Tablet 20 milliGRAM(s) Oral daily  glucagon  Injectable 1 milliGRAM(s) IntraMuscular once  insulin glargine Injectable (LANTUS) 15 Unit(s) SubCutaneous before breakfast  insulin lispro (ADMELOG) corrective regimen sliding scale   SubCutaneous three times a day before meals  insulin lispro (ADMELOG) corrective regimen sliding scale   SubCutaneous at bedtime  insulin lispro Injectable (ADMELOG) 5 Unit(s) SubCutaneous three times a day before meals  lactated ringers. 1000 milliLiter(s) (150 mL/Hr) IV Continuous <Continuous>  lisinopril 40 milliGRAM(s) Oral daily  piperacillin/tazobactam IVPB.. 3.375 Gram(s) IV Intermittent every 8 hours  potassium chloride   Powder 40 milliEquivalent(s) Oral every 4 hours  potassium chloride  10 mEq/100 mL IVPB 10 milliEquivalent(s) IV Intermittent every 1 hour  potassium phosphate / sodium phosphate Powder (PHOS-NaK) 1 Packet(s) Oral once    MEDICATIONS  (PRN):  acetaminophen     Tablet .. 650 milliGRAM(s) Oral every 6 hours PRN Temp greater or equal to 38C (100.4F), Mild Pain (1 - 3)  dextrose Oral Gel 15 Gram(s) Oral once PRN Blood Glucose LESS THAN 70 milliGRAM(s)/deciliter  dextrose Oral Gel 15 Gram(s) Oral once PRN Blood Glucose LESS THAN 70 milliGRAM(s)/deciliter  melatonin 3 milliGRAM(s) Oral at bedtime PRN Insomnia  ondansetron Injectable 4 milliGRAM(s) IV Push every 6 hours PRN Nausea and/or Vomiting      Allergies  No Known Allergies    Vital Signs Last 24 Hrs  T(C): 37.9 (23 Apr 2025 06:31), Max: 38.4 (23 Apr 2025 05:30)  T(F): 100.2 (23 Apr 2025 06:31), Max: 101.1 (23 Apr 2025 05:30)  HR: 96 (23 Apr 2025 06:42) (79 - 163)  BP: 123/73 (23 Apr 2025 06:31) (115/65 - 150/80)  BP(mean): --  RR: 20 (23 Apr 2025 06:31) (18 - 20)  SpO2: 93% (23 Apr 2025 06:31) (93% - 99%)    Parameters below as of 23 Apr 2025 06:31  Patient On (Oxygen Delivery Method): room air      I&O's Detail    22 Apr 2025 07:01  -  23 Apr 2025 07:00  --------------------------------------------------------  IN:  Total IN: 0 mL    OUT:    Indwelling Catheter - Urethral (mL): 1100 mL    Stool (mL): 1 mL    Voided (mL): 700 mL  Total OUT: 1801 mL    Total NET: -1801 mL          Physical Exam:  Constitutional: Thin, elderly F in NAD  Respiratory: coarse BS  Cardiovascular: normal S1, S2  Gastrointestinal: soft, ND, NT, + BS  Genitourinary: [place] with clear yellow urine  Extremities: LLE with large lat calf necrotic eschar without any periwound erythema or fluctuance. Large distal calf/dorsal foot necrotic eschar without any erythema. Left lat malleolar dry necrotic eschar with periwound erythema without fluctuance. L heel dry necrotic eschar. L foot sensory and motor abnormalities at baseline with bilat drop foot  Pulses:   Right:                                                                          Left:  FEM [x]2+ [ ]1+ [ ]doppler                                             FEM [x ]2+ [ ]1+ [ ]doppler    POP [ ]2+ [ x]1+ [ ]doppler                                             POP [ ]2+ [ ]1+ [ ]doppler    DP [x ]2+ [ ]1+ [ ]doppler                                                DP [ ]2+ [ ]1+ [ ]doppler  PT[ ]2+ [x ]1+ [ ]doppler                                                  PT [ ]2+ [ ]1+ [x ]doppler    LABS:                        9.1    15.18 )-----------( 330      ( 23 Apr 2025 06:45 )             28.2     04-23    141  |  102  |  8   ----------------------------<  234[H]  2.6[LL]   |  28  |  0.56    Ca    8.9      23 Apr 2025 06:45  Phos  2.1     04-23  Mg     1.5     04-23    TPro  6.0  /  Alb  2.2[L]  /  TBili  0.5  /  DBili  x   /  AST  12[L]  /  ALT  10[L]  /  AlkPhos  99  04-22    CAPILLARY BLOOD GLUCOSE  POCT Blood Glucose.: 234 mg/dL (23 Apr 2025 08:16)  POCT Blood Glucose.: 242 mg/dL (23 Apr 2025 05:51)  POCT Blood Glucose.: 239 mg/dL (22 Apr 2025 21:47)  POCT Blood Glucose.: 87 mg/dL (22 Apr 2025 20:58)  POCT Blood Glucose.: 59 mg/dL (22 Apr 2025 20:31)  POCT Blood Glucose.: 54 mg/dL (22 Apr 2025 20:16)  POCT Blood Glucose.: 60 mg/dL (22 Apr 2025 20:14)  POCT Blood Glucose.: 92 mg/dL (22 Apr 2025 17:26)  POCT Blood Glucose.: 291 mg/dL (22 Apr 2025 12:05)    RECENT CULTURES:  04-21 Swab Swab XXXX XXXX   Moderate Pseudomonas aeruginosa    04-21 Blood Blood-Peripheral XXXX XXXX   No growth at 24 hours    04-21 Blood Blood-Peripheral XXXX XXXX   No growth at 24 hours    COVID-19 PCR: Detected (21 Apr 2025 23:50)      Radiology and Additional Studies:

## 2025-04-23 NOTE — PROGRESS NOTE ADULT - ASSESSMENT
74-year-old white female with PMH HTN, HLD, diabetes mellitus on insulin and PAD S/P multiple LLE angios with L SFA and pop in stentocclusion with vascular ulcers/gangrene left lower extremity. Presented for elective LLE angiogram and was noted with elevated WBC and glucose and sent to ED for medical optimization prior to surgery.  Significant wounds to LLE with sensory and motor dysfunction at baseline ? from MS  COVID + on isolation  She has extensive wounds in the left lower extremity.  Salvageability of the limb is at this point questionable.   No plans for acute surgical intervention at this point.  Discussed with Dr Burden

## 2025-04-23 NOTE — PROGRESS NOTE ADULT - SUBJECTIVE AND OBJECTIVE BOX
Patient is a 74y old  Female who presents with a chief complaint of Hyperglycemia (22 Apr 2025 15:01)    updates: seen patient at bedside, lethargic, c/o nausea, poor PO intake, had hypoglycemia event last night.    HPI:  74-year-old white female  presently residing in rehab center with history of diabetes mellitus on insulin hypertension and hyperlipidemia who had developed vascular ulcers/gangrene left lower extremity a and he was evaluated as outpatient by vascular surgeon scheduled for surgery today and because of glucose being out of control was sent to the emergency department here at [place] for further evaluation care treatment and management as well as stabilization of patient from a metabolic and glucose standpoint.  Patient states that on a good day her sugar runs 180-200.  No recent fever chills nausea vomiting or diarrhea.  ED COURSE:  Vitals: T 97.7  F , 90 HR  ,  /47 , RR 16 , SpO2  100% on RA  Labs significant for: wbc 15.92, ,   Imaging: No acute infilitrate on chest x ray   EKG: NSR, qtc 430  Pt received: 10u admelog, zosyn 3.375 g, vanc 1g, LR 1L   (21 Apr 2025 12:55)      PAST MEDICAL & SURGICAL HISTORY:  DM (diabetes mellitus)      HTN (hypertension)      No significant past surgical history        Allergies    No Known Allergies    Intolerances        MEDICATIONS  (STANDING):  amLODIPine   Tablet 10 milliGRAM(s) Oral daily  apixaban 5 milliGRAM(s) Oral every 12 hours  aspirin  chewable 81 milliGRAM(s) Oral daily  atorvastatin 20 milliGRAM(s) Oral at bedtime  dextrose 5%. 1000 milliLiter(s) (50 mL/Hr) IV Continuous <Continuous>  dextrose 5%. 1000 milliLiter(s) (100 mL/Hr) IV Continuous <Continuous>  dextrose 50% Injectable 25 Gram(s) IV Push once  dextrose 50% Injectable 12.5 Gram(s) IV Push once  dextrose 50% Injectable 25 Gram(s) IV Push once  donepezil 5 milliGRAM(s) Oral at bedtime  furosemide    Tablet 20 milliGRAM(s) Oral daily  glucagon  Injectable 1 milliGRAM(s) IntraMuscular once  insulin glargine Injectable (LANTUS) 15 Unit(s) SubCutaneous before breakfast  insulin lispro (ADMELOG) corrective regimen sliding scale   SubCutaneous three times a day before meals  insulin lispro (ADMELOG) corrective regimen sliding scale   SubCutaneous at bedtime  insulin lispro Injectable (ADMELOG) 5 Unit(s) SubCutaneous three times a day before meals  lactated ringers. 1000 milliLiter(s) (150 mL/Hr) IV Continuous <Continuous>  lisinopril 40 milliGRAM(s) Oral daily  magnesium sulfate  IVPB 2 Gram(s) IV Intermittent once  piperacillin/tazobactam IVPB.. 3.375 Gram(s) IV Intermittent every 8 hours  potassium chloride   Powder 40 milliEquivalent(s) Oral every 4 hours  potassium chloride  10 mEq/100 mL IVPB 10 milliEquivalent(s) IV Intermittent every 1 hour  potassium phosphate / sodium phosphate Powder (PHOS-NaK) 1 Packet(s) Oral once

## 2025-04-23 NOTE — CHART NOTE - NSCHARTNOTEFT_GEN_A_CORE
Chart reviewed, case discussed with Dr. Plummer.  Awaiting vascular surgery conversation with family prior to further GOC.  No documented conversation to date. remain available.    Nikki Jose MD, [facility]; Palliative Care Attending, [phone]

## 2025-04-23 NOTE — PROGRESS NOTE ADULT - SUBJECTIVE AND OBJECTIVE BOX
Patient is a 74y old  Female who presents with a chief complaint of Hyperglycemia (23 Apr 2025 11:06)      FROM ADMISSION H+P:   HPI:  74-year-old white female  presently residing in rehab center with history of diabetes mellitus on insulin hypertension and hyperlipidemia who had developed vascular ulcers/gangrene left lower extremity a and he was evaluated as outpatient by vascular surgeon scheduled for surgery today and because of glucose being out of control was sent to the emergency department here at [place] for further evaluation care treatment and management as well as stabilization of patient from a metabolic and glucose standpoint.  Patient states that on a good day her sugar runs 180-200.  No recent fever chills nausea vomiting or diarrhea.  ED COURSE:  Vitals: T 97.7  F , 90 HR  ,  /47 , RR 16 , SpO2  100% on RA  Labs significant for: wbc 15.92, ,   Imaging: No acute infilitrate on chest x ray   EKG: NSR, qtc 430  Pt received: 10u admelog, zosyn 3.375 g, vanc 1g, LR 1L   (21 Apr 2025 12:55)      INTERVAL HPI/OVERNIGHT EVENTS: Patient was seen and examined. No acute events occurred overnight. Febrile this AM and tachycardic - EKG showing atrial fibrillation. Patient states that she feels cold, weak & nauseous. No chest pain or SOB. No abdominal pain or vomiting. Does report decreased appetite.    I&O's Summary    22 Apr 2025 07:01  -  23 Apr 2025 07:00  --------------------------------------------------------  IN: 0 mL / OUT: 1801 mL / NET: -1801 mL        PAST MEDICAL & SURGICAL HISTORY:  DM (diabetes mellitus)      HTN (hypertension)      No significant past surgical history          LABS:                        9.1    15.18 )-----------( 330      ( 23 Apr 2025 06:45 )             28.2     04-23    140  |  99  |  7   ----------------------------<  111[H]  3.4[L]   |  33[H]  |  0.67    Ca    8.7      23 Apr 2025 16:40  Phos  2.1     04-23  Mg     1.5     04-23    TPro  6.0  /  Alb  2.2[L]  /  TBili  0.5  /  DBili  x   /  AST  12[L]  /  ALT  10[L]  /  AlkPhos  99  04-22      Urinalysis Basic - ( 23 Apr 2025 16:40 )    Color: x / Appearance: x / SG: x / pH: x  Gluc: 111 mg/dL / Ketone: x  / Bili: x / Urobili: x   Blood: x / Protein: x / Nitrite: x   Leuk Esterase: x / RBC: x / WBC x   Sq Epi: x / Non Sq Epi: x / Bacteria: x      CAPILLARY BLOOD GLUCOSE      POCT Blood Glucose.: 108 mg/dL (23 Apr 2025 17:23)  POCT Blood Glucose.: 145 mg/dL (23 Apr 2025 11:58)  POCT Blood Glucose.: 234 mg/dL (23 Apr 2025 08:16)  POCT Blood Glucose.: 242 mg/dL (23 Apr 2025 05:51)  POCT Blood Glucose.: 239 mg/dL (22 Apr 2025 21:47)  POCT Blood Glucose.: 87 mg/dL (22 Apr 2025 20:58)  POCT Blood Glucose.: 59 mg/dL (22 Apr 2025 20:31)  POCT Blood Glucose.: 54 mg/dL (22 Apr 2025 20:16)  POCT Blood Glucose.: 60 mg/dL (22 Apr 2025 20:14)        Urinalysis Basic - ( 23 Apr 2025 16:40 )    Color: x / Appearance: x / SG: x / pH: x  Gluc: 111 mg/dL / Ketone: x  / Bili: x / Urobili: x   Blood: x / Protein: x / Nitrite: x   Leuk Esterase: x / RBC: x / WBC x   Sq Epi: x / Non Sq Epi: x / Bacteria: x        MEDICATIONS  (STANDING):  amLODIPine   Tablet 10 milliGRAM(s) Oral daily  apixaban 5 milliGRAM(s) Oral every 12 hours  aspirin  chewable 81 milliGRAM(s) Oral daily  atorvastatin 20 milliGRAM(s) Oral at bedtime  dextrose 5%. 1000 milliLiter(s) (50 mL/Hr) IV Continuous <Continuous>  dextrose 5%. 1000 milliLiter(s) (100 mL/Hr) IV Continuous <Continuous>  dextrose 50% Injectable 25 Gram(s) IV Push once  dextrose 50% Injectable 12.5 Gram(s) IV Push once  dextrose 50% Injectable 25 Gram(s) IV Push once  donepezil 5 milliGRAM(s) Oral at bedtime  glucagon  Injectable 1 milliGRAM(s) IntraMuscular once  insulin glargine Injectable (LANTUS) 15 Unit(s) SubCutaneous before breakfast  insulin lispro (ADMELOG) corrective regimen sliding scale   SubCutaneous at bedtime  insulin lispro (ADMELOG) corrective regimen sliding scale   SubCutaneous three times a day before meals  insulin lispro Injectable (ADMELOG) 5 Unit(s) SubCutaneous three times a day before meals  lisinopril 40 milliGRAM(s) Oral daily  piperacillin/tazobactam IVPB.. 3.375 Gram(s) IV Intermittent every 8 hours  potassium chloride   Powder 40 milliEquivalent(s) Oral every 4 hours    MEDICATIONS  (PRN):  acetaminophen     Tablet .. 650 milliGRAM(s) Oral every 6 hours PRN Temp greater or equal to 38C (100.4F), Mild Pain (1 - 3)  dextrose Oral Gel 15 Gram(s) Oral once PRN Blood Glucose LESS THAN 70 milliGRAM(s)/deciliter  dextrose Oral Gel 15 Gram(s) Oral once PRN Blood Glucose LESS THAN 70 milliGRAM(s)/deciliter  melatonin 3 milliGRAM(s) Oral at bedtime PRN Insomnia  ondansetron Injectable 4 milliGRAM(s) IV Push every 6 hours PRN Nausea and/or Vomiting      REVIEW OF SYSTEMS:  CONSTITUTIONAL: + fever, + chills  HEENT:  No headache, no sore throat  RESPIRATORY: No cough, wheezing, or shortness of breath  CARDIOVASCULAR: No chest pain, palpitations  GASTROINTESTINAL: No abdominal pain; +nausea  GENITOURINARY: No dysuria, frequency, or hematuria  NEUROLOGICAL: no focal weakness or dizziness  MUSCULOSKELETAL: no myalgias  PSYCH: no recent changes in mood    RADIOLOGY & ADDITIONAL TESTS:    Imaging Personally Reviewed:  [x] YES  [ ] NO    Consultant(s) Notes Reviewed:  [x] YES  [ ] NO    PHYSICAL EXAM:  T(C): 37.9 (04-23-25 @ 12:23), Max: 38.4 (04-23-25 @ 05:30)  HR: 86 (04-23-25 @ 12:23) (86 - 163)  BP: 139/61 (04-23-25 @ 12:23) (123/73 - 150/80)  RR: 19 (04-23-25 @ 12:23) (18 - 20)  SpO2: 99% (04-23-25 @ 12:23) (93% - 99%)    GENERAL: ill appearing female  HEENT:  anicteric, moist mucous membranes  CHEST/LUNG:  CTA b/l, no rales, wheezes, or rhonchi  HEART:  IRRR, S1, S2  ABDOMEN:  BS+, soft, nontender, nondistended  EXTREMITIES: no edema, cyanosis, or calf tenderness  NERVOUS SYSTEM: answers questions and follows commands appropriately  PSYCH: normal affect    Care Discussed with Consultants/Other Providers [x] YES  [ ] NO

## 2025-04-24 ENCOUNTER — RESULT REVIEW (OUTPATIENT)
Age: 75
End: 2025-04-24

## 2025-04-24 DIAGNOSIS — I73.9 PERIPHERAL VASCULAR DISEASE, UNSPECIFIED: ICD-10-CM

## 2025-04-24 LAB
ALBUMIN SERPL ELPH-MCNC: 2.2 G/DL — LOW (ref 3.3–5)
ALP SERPL-CCNC: 122 U/L — HIGH (ref 40–120)
ALT FLD-CCNC: 13 U/L — SIGNIFICANT CHANGE UP (ref 12–78)
ANION GAP SERPL CALC-SCNC: 12 MMOL/L — SIGNIFICANT CHANGE UP (ref 5–17)
AST SERPL-CCNC: 25 U/L — SIGNIFICANT CHANGE UP (ref 15–37)
BASOPHILS # BLD AUTO: 0.02 K/UL — SIGNIFICANT CHANGE UP (ref 0–0.2)
BASOPHILS NFR BLD AUTO: 0.2 % — SIGNIFICANT CHANGE UP (ref 0–2)
BILIRUB SERPL-MCNC: 0.4 MG/DL — SIGNIFICANT CHANGE UP (ref 0.2–1.2)
BUN SERPL-MCNC: 7 MG/DL — SIGNIFICANT CHANGE UP (ref 7–23)
CALCIUM SERPL-MCNC: 8.7 MG/DL — SIGNIFICANT CHANGE UP (ref 8.5–10.1)
CHLORIDE SERPL-SCNC: 98 MMOL/L — SIGNIFICANT CHANGE UP (ref 96–108)
CO2 SERPL-SCNC: 29 MMOL/L — SIGNIFICANT CHANGE UP (ref 22–31)
CREAT SERPL-MCNC: 0.51 MG/DL — SIGNIFICANT CHANGE UP (ref 0.5–1.3)
CULTURE RESULTS: ABNORMAL
EGFR: 97 ML/MIN/1.73M2 — SIGNIFICANT CHANGE UP
EGFR: 97 ML/MIN/1.73M2 — SIGNIFICANT CHANGE UP
EOSINOPHIL # BLD AUTO: 0 K/UL — SIGNIFICANT CHANGE UP (ref 0–0.5)
EOSINOPHIL NFR BLD AUTO: 0 % — SIGNIFICANT CHANGE UP (ref 0–6)
GLUCOSE SERPL-MCNC: 279 MG/DL — HIGH (ref 70–99)
HCT VFR BLD CALC: 29.6 % — LOW (ref 34.5–45)
HGB BLD-MCNC: 9.6 G/DL — LOW (ref 11.5–15.5)
IMM GRANULOCYTES NFR BLD AUTO: 0.7 % — SIGNIFICANT CHANGE UP (ref 0–0.9)
LYMPHOCYTES # BLD AUTO: 0.47 K/UL — LOW (ref 1–3.3)
LYMPHOCYTES # BLD AUTO: 3.7 % — LOW (ref 13–44)
MAGNESIUM SERPL-MCNC: 1.9 MG/DL — SIGNIFICANT CHANGE UP (ref 1.6–2.6)
MCHC RBC-ENTMCNC: 29.3 PG — SIGNIFICANT CHANGE UP (ref 27–34)
MCHC RBC-ENTMCNC: 32.4 G/DL — SIGNIFICANT CHANGE UP (ref 32–36)
MCV RBC AUTO: 90.2 FL — SIGNIFICANT CHANGE UP (ref 80–100)
MONOCYTES # BLD AUTO: 0.43 K/UL — SIGNIFICANT CHANGE UP (ref 0–0.9)
MONOCYTES NFR BLD AUTO: 3.4 % — SIGNIFICANT CHANGE UP (ref 2–14)
MRSA PCR RESULT.: DETECTED
NEUTROPHILS # BLD AUTO: 11.6 K/UL — HIGH (ref 1.8–7.4)
NEUTROPHILS NFR BLD AUTO: 92 % — HIGH (ref 43–77)
NRBC BLD AUTO-RTO: 0 /100 WBCS — SIGNIFICANT CHANGE UP (ref 0–0)
PHOSPHATE SERPL-MCNC: 2.2 MG/DL — LOW (ref 2.5–4.5)
PLATELET # BLD AUTO: 331 K/UL — SIGNIFICANT CHANGE UP (ref 150–400)
POTASSIUM SERPL-MCNC: 3.2 MMOL/L — LOW (ref 3.5–5.3)
POTASSIUM SERPL-SCNC: 3.2 MMOL/L — LOW (ref 3.5–5.3)
PROT SERPL-MCNC: 6.5 G/DL — SIGNIFICANT CHANGE UP (ref 6–8.3)
RBC # BLD: 3.28 M/UL — LOW (ref 3.8–5.2)
RBC # FLD: 14.4 % — SIGNIFICANT CHANGE UP (ref 10.3–14.5)
S AUREUS DNA NOSE QL NAA+PROBE: DETECTED
SODIUM SERPL-SCNC: 139 MMOL/L — SIGNIFICANT CHANGE UP (ref 135–145)
SPECIMEN SOURCE: SIGNIFICANT CHANGE UP
WBC # BLD: 12.61 K/UL — HIGH (ref 3.8–10.5)
WBC # FLD AUTO: 12.61 K/UL — HIGH (ref 3.8–10.5)

## 2025-04-24 PROCEDURE — 93010 ELECTROCARDIOGRAM REPORT: CPT

## 2025-04-24 PROCEDURE — G0545: CPT

## 2025-04-24 PROCEDURE — 99233 SBSQ HOSP IP/OBS HIGH 50: CPT

## 2025-04-24 PROCEDURE — 93306 TTE W/DOPPLER COMPLETE: CPT | Mod: 26

## 2025-04-24 PROCEDURE — 99223 1ST HOSP IP/OBS HIGH 75: CPT

## 2025-04-24 PROCEDURE — 99232 SBSQ HOSP IP/OBS MODERATE 35: CPT

## 2025-04-24 RX ORDER — METOPROLOL SUCCINATE 50 MG/1
25 TABLET, EXTENDED RELEASE ORAL DAILY
Refills: 0 | Status: DISCONTINUED | OUTPATIENT
Start: 2025-04-25 | End: 2025-04-25

## 2025-04-24 RX ORDER — MAGNESIUM SULFATE 500 MG/ML
1 SYRINGE (ML) INJECTION ONCE
Refills: 0 | Status: COMPLETED | OUTPATIENT
Start: 2025-04-24 | End: 2025-04-24

## 2025-04-24 RX ORDER — CIPROFLOXACIN HCL 250 MG
500 TABLET ORAL EVERY 12 HOURS
Refills: 0 | Status: DISCONTINUED | OUTPATIENT
Start: 2025-04-24 | End: 2025-04-30

## 2025-04-24 RX ORDER — POTASSIUM PHOSPHATE, MONOBASIC POTASSIUM PHOSPHATE, DIBASIC INJECTION, 236; 224 MG/ML; MG/ML
15 SOLUTION, CONCENTRATE INTRAVENOUS ONCE
Refills: 0 | Status: COMPLETED | OUTPATIENT
Start: 2025-04-24 | End: 2025-04-24

## 2025-04-24 RX ADMIN — Medication 100 GRAM(S): at 12:01

## 2025-04-24 RX ADMIN — POTASSIUM PHOSPHATE, MONOBASIC POTASSIUM PHOSPHATE, DIBASIC INJECTION, 62.5 MILLIMOLE(S): 236; 224 SOLUTION, CONCENTRATE INTRAVENOUS at 12:57

## 2025-04-24 RX ADMIN — ATORVASTATIN CALCIUM 20 MILLIGRAM(S): 80 TABLET, FILM COATED ORAL at 23:11

## 2025-04-24 RX ADMIN — LISINOPRIL 40 MILLIGRAM(S): 5 TABLET ORAL at 06:40

## 2025-04-24 RX ADMIN — METOPROLOL SUCCINATE 25 MILLIGRAM(S): 50 TABLET, EXTENDED RELEASE ORAL at 17:08

## 2025-04-24 RX ADMIN — Medication 500 MILLIGRAM(S): at 17:07

## 2025-04-24 RX ADMIN — Medication 4 MILLIGRAM(S): at 17:07

## 2025-04-24 RX ADMIN — METOPROLOL SUCCINATE 25 MILLIGRAM(S): 50 TABLET, EXTENDED RELEASE ORAL at 06:40

## 2025-04-24 RX ADMIN — INSULIN LISPRO 3: 100 INJECTION, SOLUTION INTRAVENOUS; SUBCUTANEOUS at 08:34

## 2025-04-24 RX ADMIN — Medication 81 MILLIGRAM(S): at 12:05

## 2025-04-24 RX ADMIN — Medication 4 MILLIGRAM(S): at 23:11

## 2025-04-24 RX ADMIN — Medication 40 MILLIEQUIVALENT(S): at 17:07

## 2025-04-24 RX ADMIN — APIXABAN 5 MILLIGRAM(S): 2.5 TABLET, FILM COATED ORAL at 06:40

## 2025-04-24 RX ADMIN — Medication 4 MILLIGRAM(S): at 06:40

## 2025-04-24 RX ADMIN — Medication 40 MILLIEQUIVALENT(S): at 12:05

## 2025-04-24 RX ADMIN — INSULIN GLARGINE-YFGN 15 UNIT(S): 100 INJECTION, SOLUTION SUBCUTANEOUS at 08:34

## 2025-04-24 RX ADMIN — DONEPEZIL HYDROCHLORIDE 5 MILLIGRAM(S): 5 TABLET ORAL at 23:11

## 2025-04-24 RX ADMIN — APIXABAN 5 MILLIGRAM(S): 2.5 TABLET, FILM COATED ORAL at 17:07

## 2025-04-24 RX ADMIN — INSULIN LISPRO 2: 100 INJECTION, SOLUTION INTRAVENOUS; SUBCUTANEOUS at 12:06

## 2025-04-24 RX ADMIN — Medication 25 GRAM(S): at 03:11

## 2025-04-24 NOTE — CAREGIVER ENGAGEMENT NOTE - CAREGIVER OUTREACH NOTES - FREE TEXT
SW spoke with pt's spouse Trevon regarding dc planning. Per Trevon, he does not wish to provide [facility] choices yet until he speaks with MD. He deferred to lala Law P# [phone] for discharge planning needs. SW to follow up with lala Law in the AM for [facility]EN choices. SW to follow and remain available for any needs.

## 2025-04-24 NOTE — CONSULT NOTE ADULT - SUBJECTIVE AND OBJECTIVE BOX
"  RESPIRATORY CARE NOTE     Patient Name: Babak Wolff  Today's Date: 1/14/2017          Visit Vitals     /47 (Patient Position: Lying)     Pulse (!) 57     Temp 98  F (36.7  C) (Axillary)     Resp 16     Ht 6' 3\" (1.905 m)     Wt (!) 256 lb 6.3 oz (116.3 kg)     SpO2 96%     BMI 32.05 kg/m2       Arterial Blood Gas result:  pH 7.44; pCO2 35; pO2 76; HCO3 24.8, %O2 Sat 99 on following settings.    Vent Mode: VCV  FiO2 (%):  [30 %-40 %] 30 %  S RR:  [14] 14  S VT:  [550 mL] 550 mL  PEEP/CPAP (cm H2O):  [5 cm H2O] 5 cm H2O  Minute Ventilation (L/min):  [7.5 L/min-9.4 L/min] 7.6 L/min  PIP:  [27 cm H2O-42 cm H2O] 42 cm H2O  MAP (cm H2O):  [8-11] 9    CXR on 01-14 results :hypoexpanded lungs with basilar opac, likely atel.  Ett 8.0 25 teeth  SBT done x 57 minutes on cpap 5, ps 5 30%, RR 14, -800 ml, RSBI 25,  MD only wanted a short wean, no plans to extubate today    Breath sounds mostly clear, suctioning thick pale yellow    Plan for full vent support, wean and extubate when appropiate.     Norma Fernández, LRT  " Patient is a 74y old  Female who presents with a chief complaint of Hyperglycemia (2025 19:48). HPI per below. Pt is a poor historian and unable to comment on PMHx. Pt currently denies chest pain, tightness, palpitations, shortness of breath, or dyspnea. Endorses persistent nausea. No cardiac complaints at this time.     HPI:  74-year-old white female  presently residing in rehab center with history of diabetes mellitus on insulin hypertension and hyperlipidemia who had developed vascular ulcers/gangrene left lower extremity a and he was evaluated as outpatient by vascular surgeon scheduled for surgery today and because of glucose being out of control was sent to the emergency department here at [place] for further evaluation care treatment and management as well as stabilization of patient from a metabolic and glucose standpoint.  Patient states that on a good day her sugar runs 180-200.  No recent fever chills nausea vomiting or diarrhea.  ED COURSE:  Vitals: T 97.7  F , 90 HR  ,  /47 , RR 16 , SpO2  100% on RA  Labs significant for: wbc 15.92, ,   Imaging: No acute infilitrate on chest x ray   EKG: NSR, qtc 430  Pt received: 10u admelog, zosyn 3.375 g, vanc 1g, LR 1L   (2025 12:55)      CARDIAC Hx:  - Cardiologist: Dr. EPIFANIO NORRIS  - ECHO: No prior study   - Holter Monitor 2022: Sinus with PACs, no significant events   - Stress Testing 2022: Normal study, no EKG changes or chest pain, LVEF 65%   - EK bpm, Atrial fibrillation with rapid ventricular response, Marked ST abnormality, possible inferior subendocardial injury    PAST MEDICAL & SURGICAL HISTORY:  DM (diabetes mellitus)      HTN (hypertension)      No significant past surgical history          MEDICATIONS  (STANDING):  apixaban 5 milliGRAM(s) Oral every 12 hours  aspirin  chewable 81 milliGRAM(s) Oral daily  atorvastatin 20 milliGRAM(s) Oral at bedtime  dextrose 5%. 1000 milliLiter(s) (100 mL/Hr) IV Continuous <Continuous>  dextrose 5%. 1000 milliLiter(s) (50 mL/Hr) IV Continuous <Continuous>  dextrose 50% Injectable 25 Gram(s) IV Push once  dextrose 50% Injectable 12.5 Gram(s) IV Push once  dextrose 50% Injectable 25 Gram(s) IV Push once  donepezil 5 milliGRAM(s) Oral at bedtime  glucagon  Injectable 1 milliGRAM(s) IntraMuscular once  insulin glargine Injectable (LANTUS) 15 Unit(s) SubCutaneous before breakfast  insulin lispro (ADMELOG) corrective regimen sliding scale   SubCutaneous three times a day before meals  insulin lispro (ADMELOG) corrective regimen sliding scale   SubCutaneous at bedtime  insulin lispro Injectable (ADMELOG) 5 Unit(s) SubCutaneous three times a day before meals  lisinopril 40 milliGRAM(s) Oral daily  metoprolol tartrate 25 milliGRAM(s) Oral two times a day  piperacillin/tazobactam IVPB.. 3.375 Gram(s) IV Intermittent every 8 hours    MEDICATIONS  (PRN):  acetaminophen     Tablet .. 650 milliGRAM(s) Oral every 6 hours PRN Temp greater or equal to 38C (100.4F), Mild Pain (1 - 3)  dextrose Oral Gel 15 Gram(s) Oral once PRN Blood Glucose LESS THAN 70 milliGRAM(s)/deciliter  dextrose Oral Gel 15 Gram(s) Oral once PRN Blood Glucose LESS THAN 70 milliGRAM(s)/deciliter  melatonin 3 milliGRAM(s) Oral at bedtime PRN Insomnia  ondansetron Injectable 4 milliGRAM(s) IV Push every 6 hours PRN Nausea and/or Vomiting      FAMILY HISTORY:    Denies Family history of CAD or early MI    REVIEW OF SYSTEMS: As per HPI, otherwise negative       SOCIAL HISTORY:    No tobacco, Alcohol or Drug use    Vital Signs Last 24 Hrs  T(C): 36.6 (2025 06:20), Max: 37.9 (2025 12:23)  T(F): 97.9 (2025 06:20), Max: 100.3 (2025 12:23)  HR: 96 (2025 06:20) (86 - 96)  BP: 163/69 (2025 06:20) (139/61 - 163/69)  BP(mean): --  RR: 18 (2025 06:20) (18 - 19)  SpO2: 98% (2025 06:20) (98% - 99%)    Parameters below as of 2025 06:20  Patient On (Oxygen Delivery Method): room air        Physical Exam:  T(C): 36.6 (25 @ 06:20), Max: 37.9 (25 @ 12:23)  HR: 96 (25 @ 06:20) (86 - 96)  BP: 163/69 (25 @ 06:20) (139/61 - 163/69)  RR: 18 (25 @ 06:20) (18 - 19)  SpO2: 98% (25 @ 06:20) (98% - 99%)    GENERAL: patient appears ill, no acute distress, appropriately interactive  LUNGS: shallow respirations, clear to auscultation   HEART: soft S1/S2, regular rate and rhythm, no murmurs noted, no lower extremity edema  GASTROINTESTINAL: abdomen is soft, nontender, nondistended, normoactive bowel sounds  INTEGUMENT: good skin turgor, warm skin, appears well perfused  MUSCULOSKELETAL: Left foot with dressing, clean dry and intact   NEUROLOGIC: awake, alert, oriented x1-2      I&O's Detail    2025 07:01  -  2025 07:00  --------------------------------------------------------  IN:  Total IN: 0 mL    OUT:    Indwelling Catheter - Urethral (mL): 800 mL    Stool (mL): 1 mL  Total OUT: 801 mL    Total NET: -801 mL          LABS:                        9.1    15.18 )-----------( 330      ( 2025 06:45 )             28.2         140  |  99  |  7   ----------------------------<  111[H]  3.4[L]   |  33[H]  |  0.67    Ca    8.7      2025 16:40  Phos  2.1       Mg     1.5         TPro  6.0  /  Alb  2.2[L]  /  TBili  0.5  /  DBili  x   /  AST  12[L]  /  ALT  10[L]  /  AlkPhos  99            Urinalysis Basic - ( 2025 16:40 )    Color: x / Appearance: x / SG: x / pH: x  Gluc: 111 mg/dL / Ketone: x  / Bili: x / Urobili: x   Blood: x / Protein: x / Nitrite: x   Leuk Esterase: x / RBC: x / WBC x   Sq Epi: x / Non Sq Epi: x / Bacteria: x      I&O's Summary    2025 07:01  -  2025 07:00  --------------------------------------------------------  IN: 0 mL / OUT: 801 mL / NET: -801 mL      BNP  RADIOLOGY & ADDITIONAL STUDIES: Patient is a 74y old  Female who presents with a chief complaint of Hyperglycemia (2025 19:48). HPI per below. Pt is a poor historian and unable to comment on PMHx. Pt currently denies chest pain, tightness, palpitations, shortness of breath, or dyspnea. Endorses persistent nausea. No cardiac complaints at this time.     HPI:  74-year-old white female  presently residing in rehab center with history of diabetes mellitus on insulin hypertension and hyperlipidemia who had developed vascular ulcers/gangrene left lower extremity a and he was evaluated as outpatient by vascular surgeon scheduled for surgery today and because of glucose being out of control was sent to the emergency department here at [place] for further evaluation care treatment and management as well as stabilization of patient from a metabolic and glucose standpoint.  Patient states that on a good day her sugar runs 180-200.  No recent fever chills nausea vomiting or diarrhea.  ED COURSE:  Vitals: T 97.7  F , 90 HR  ,  /47 , RR 16 , SpO2  100% on RA  Labs significant for: wbc 15.92, ,   Imaging: No acute infilitrate on chest x ray   EKG: NSR, qtc 430  Pt received: 10u admelog, zosyn 3.375 g, vanc 1g, LR 1L   (2025 12:55)      CARDIAC Hx:  - Cardiologist: Dr. EPIFANIO NORRIS  - ECHO: No prior study   - Holter Monitor 2022: Sinus with PACs, no significant events   - Stress Testing 2022: Normal study, no EKG changes or chest pain, LVEF 65%   - EK bpm, Atrial fibrillation with rapid ventricular response, Marked ST abnormality, possible inferior subendocardial injury    PAST MEDICAL & SURGICAL HISTORY:  DM (diabetes mellitus)      HTN (hypertension)      No significant past surgical history          MEDICATIONS  (STANDING):  apixaban 5 milliGRAM(s) Oral every 12 hours  aspirin  chewable 81 milliGRAM(s) Oral daily  atorvastatin 20 milliGRAM(s) Oral at bedtime  dextrose 5%. 1000 milliLiter(s) (100 mL/Hr) IV Continuous <Continuous>  dextrose 5%. 1000 milliLiter(s) (50 mL/Hr) IV Continuous <Continuous>  dextrose 50% Injectable 25 Gram(s) IV Push once  dextrose 50% Injectable 12.5 Gram(s) IV Push once  dextrose 50% Injectable 25 Gram(s) IV Push once  donepezil 5 milliGRAM(s) Oral at bedtime  glucagon  Injectable 1 milliGRAM(s) IntraMuscular once  insulin glargine Injectable (LANTUS) 15 Unit(s) SubCutaneous before breakfast  insulin lispro (ADMELOG) corrective regimen sliding scale   SubCutaneous three times a day before meals  insulin lispro (ADMELOG) corrective regimen sliding scale   SubCutaneous at bedtime  insulin lispro Injectable (ADMELOG) 5 Unit(s) SubCutaneous three times a day before meals  lisinopril 40 milliGRAM(s) Oral daily  metoprolol tartrate 25 milliGRAM(s) Oral two times a day  piperacillin/tazobactam IVPB.. 3.375 Gram(s) IV Intermittent every 8 hours    MEDICATIONS  (PRN):  acetaminophen     Tablet .. 650 milliGRAM(s) Oral every 6 hours PRN Temp greater or equal to 38C (100.4F), Mild Pain (1 - 3)  dextrose Oral Gel 15 Gram(s) Oral once PRN Blood Glucose LESS THAN 70 milliGRAM(s)/deciliter  dextrose Oral Gel 15 Gram(s) Oral once PRN Blood Glucose LESS THAN 70 milliGRAM(s)/deciliter  melatonin 3 milliGRAM(s) Oral at bedtime PRN Insomnia  ondansetron Injectable 4 milliGRAM(s) IV Push every 6 hours PRN Nausea and/or Vomiting      FAMILY HISTORY:    Denies Family history of CAD or early MI    REVIEW OF SYSTEMS: As per HPI, otherwise negative       SOCIAL HISTORY:    No tobacco, Alcohol or Drug use    Vital Signs Last 24 Hrs  T(C): 36.6 (2025 06:20), Max: 37.9 (2025 12:23)  T(F): 97.9 (2025 06:20), Max: 100.3 (2025 12:23)  HR: 96 (2025 06:20) (86 - 96)  BP: 163/69 (2025 06:20) (139/61 - 163/69)  BP(mean): --  RR: 18 (2025 06:20) (18 - 19)  SpO2: 98% (2025 06:20) (98% - 99%)    Parameters below as of 2025 06:20  Patient On (Oxygen Delivery Method): room air        Physical Exam:  T(C): 36.6 (25 @ 06:20), Max: 37.9 (25 @ 12:23)  HR: 96 (25 @ 06:20) (86 - 96)  BP: 163/69 (25 @ 06:20) (139/61 - 163/69)  RR: 18 (25 @ 06:20) (18 - 19)  SpO2: 98% (25 @ 06:20) (98% - 99%)    GENERAL: patient appears ill, no acute distress, appropriately interactive  LUNGS: shallow respirations, clear to auscultation   HEART: soft S1/S2, regular rate and rhythm, no murmurs noted, no lower extremity edema  GASTROINTESTINAL: abdomen is soft, nontender, nondistended, normoactive bowel sounds  INTEGUMENT: good skin turgor, warm skin, appears well perfused  MUSCULOSKELETAL: Left foot with dressing, clean dry and intact   NEUROLOGIC: awake, alert, oriented x1-2      I&O's Detail    2025 07:01  -  2025 07:00  --------------------------------------------------------  IN:  Total IN: 0 mL    OUT:    Indwelling Catheter - Urethral (mL): 800 mL    Stool (mL): 1 mL  Total OUT: 801 mL    Total NET: -801 mL          LABS:                        9.1    15.18 )-----------( 330      ( 2025 06:45 )             28.2         140  |  99  |  7   ----------------------------<  111[H]  3.4[L]   |  33[H]  |  0.67    Ca    8.7      2025 16:40  Phos  2.1       Mg     1.5         TPro  6.0  /  Alb  2.2[L]  /  TBili  0.5  /  DBili  x   /  AST  12[L]  /  ALT  10[L]  /  AlkPhos  99            Urinalysis Basic - ( 2025 16:40 )    Color: x / Appearance: x / SG: x / pH: x  Gluc: 111 mg/dL / Ketone: x  / Bili: x / Urobili: x   Blood: x / Protein: x / Nitrite: x   Leuk Esterase: x / RBC: x / WBC x   Sq Epi: x / Non Sq Epi: x / Bacteria: x      I&O's Summary    2025 07:01  -  2025 07:00  --------------------------------------------------------  IN: 0 mL / OUT: 801 mL / NET: -801 mL      BNP  RADIOLOGY & ADDITIONAL STUDIES:

## 2025-04-24 NOTE — PROGRESS NOTE ADULT - SUBJECTIVE AND OBJECTIVE BOX
[facility]   INFECTIOUS DISEASES   [address] [place] [address]  Tel: [phone]     Fax: [phone]  =========================================================  MD Blayne Bell Kaushal, MD Cho, Michelle, MD Sunjit, Jaspal, MD  =========================================================    TERESA MCHUGH [id number]    Follow up:    Allergies:  No Known Allergies      Antibiotics:  acetaminophen     Tablet .. 650 milliGRAM(s) Oral every 6 hours PRN  apixaban 5 milliGRAM(s) Oral every 12 hours  aspirin  chewable 81 milliGRAM(s) Oral daily  atorvastatin 20 milliGRAM(s) Oral at bedtime  ciprofloxacin     Tablet 500 milliGRAM(s) Oral every 12 hours  dextrose 5%. 1000 milliLiter(s) IV Continuous <Continuous>  dextrose 5%. 1000 milliLiter(s) IV Continuous <Continuous>  dextrose 50% Injectable 25 Gram(s) IV Push once  dextrose 50% Injectable 12.5 Gram(s) IV Push once  dextrose 50% Injectable 25 Gram(s) IV Push once  dextrose Oral Gel 15 Gram(s) Oral once PRN  dextrose Oral Gel 15 Gram(s) Oral once PRN  donepezil 5 milliGRAM(s) Oral at bedtime  glucagon  Injectable 1 milliGRAM(s) IntraMuscular once  insulin glargine Injectable (LANTUS) 15 Unit(s) SubCutaneous before breakfast  insulin lispro (ADMELOG) corrective regimen sliding scale   SubCutaneous three times a day before meals  insulin lispro (ADMELOG) corrective regimen sliding scale   SubCutaneous at bedtime  insulin lispro Injectable (ADMELOG) 5 Unit(s) SubCutaneous three times a day before meals  lisinopril 40 milliGRAM(s) Oral daily  melatonin 3 milliGRAM(s) Oral at bedtime PRN  metoprolol tartrate 25 milliGRAM(s) Oral two times a day  ondansetron Injectable 4 milliGRAM(s) IV Push every 6 hours PRN       REVIEW OF SYSTEMS:  Limited  SKIN:  leg wound     Physical Exam:  ICU Vital Signs Last 24 Hrs  T(C): 36.6 (24 Apr 2025 06:20), Max: 37.9 (23 Apr 2025 12:23)  T(F): 97.9 (24 Apr 2025 06:20), Max: 100.3 (23 Apr 2025 12:23)  HR: 96 (24 Apr 2025 06:20) (86 - 96)  BP: 163/69 (24 Apr 2025 06:20) (139/61 - 163/69)  BP(mean): --  ABP: --  ABP(mean): --  RR: 18 (24 Apr 2025 06:20) (18 - 19)  SpO2: 98% (24 Apr 2025 06:20) (98% - 99%)    O2 Parameters below as of 24 Apr 2025 06:20  Patient On (Oxygen Delivery Method): room air      GEN: NAD  HEENT: normocephalic and atraumatic. EOMI. PERRL.    NECK: Supple.  No lymphadenopathy   LUNGS: Clear to auscultation.  HEART: Regular rate and rhythm   ABDOMEN: Soft, nontender, and nondistended.   EXTREMITIES: Without edema.  NEUROLOGIC: grossly intact.  PSYCHIATRIC: Appropriate affect .  SKIN: LLE with 2 areas of gangrenous ulcers, one on ankle on dorsal side and   larger one in posterolateral side of leg, some discharge under dry eschar     Labs:  04-24    139  |  98  |  7   ----------------------------<  279[H]  3.2[L]   |  29  |  0.51    Ca    8.7      24 Apr 2025 07:48  Phos  2.2     04-24  Mg     1.9     04-24    TPro  6.5  /  Alb  2.2[L]  /  TBili  0.4  /  DBili  x   /  AST  25  /  ALT  13  /  AlkPhos  122[H]  04-24                          9.6    12.61 )-----------( 331      ( 24 Apr 2025 07:48 )             29.6       Urinalysis Basic - ( 24 Apr 2025 07:48 )    Color: x / Appearance: x / SG: x / pH: x  Gluc: 279 mg/dL / Ketone: x  / Bili: x / Urobili: x   Blood: x / Protein: x / Nitrite: x   Leuk Esterase: x / RBC: x / WBC x   Sq Epi: x / Non Sq Epi: x / Bacteria: x      LIVER FUNCTIONS - ( 24 Apr 2025 07:48 )  Alb: 2.2 g/dL / Pro: 6.5 g/dL / ALK PHOS: 122 U/L / ALT: 13 U/L / AST: 25 U/L / GGT: x             RECENT CULTURES:  04-22 @ 03:30 Catheterized Catheterized     Culture positive, 10,000 - 49,000 CFU/mL . Identification to follow.        04-21 @ 15:00 Swab Swab Pseudomonas aeruginosa    Moderate Pseudomonas aeruginosa        04-21 @ 09:15 Blood Blood-Peripheral     No growth at 48 Hours        04-21 @ 09:10 Blood Blood-Peripheral     No growth at 48 Hours              All imaging and data are reviewed.    [facility]   INFECTIOUS DISEASES   [address] [place] [address]  Tel: [phone]     Fax: [phone]  =========================================================  MD Blayne Bell Kaushal, MD Cho, Michelle, MD Sunjit, Jaspal, MD  =========================================================    TERES[facility]UGH [id number]    Follow up: Left lower leg ulcers. No new changes.  at the bedside.   Low grade fever 100.3.     Allergies:  No Known Allergies    Antibiotics:  acetaminophen     Tablet .. 650 milliGRAM(s) Oral every 6 hours PRN  apixaban 5 milliGRAM(s) Oral every 12 hours  aspirin  chewable 81 milliGRAM(s) Oral daily  atorvastatin 20 milliGRAM(s) Oral at bedtime  ciprofloxacin     Tablet 500 milliGRAM(s) Oral every 12 hours  dextrose 5%. 1000 milliLiter(s) IV Continuous <Continuous>  dextrose 5%. 1000 milliLiter(s) IV Continuous <Continuous>  dextrose 50% Injectable 25 Gram(s) IV Push once  dextrose 50% Injectable 12.5 Gram(s) IV Push once  dextrose 50% Injectable 25 Gram(s) IV Push once  dextrose Oral Gel 15 Gram(s) Oral once PRN  dextrose Oral Gel 15 Gram(s) Oral once PRN  donepezil 5 milliGRAM(s) Oral at bedtime  glucagon  Injectable 1 milliGRAM(s) IntraMuscular once  insulin glargine Injectable (LANTUS) 15 Unit(s) SubCutaneous before breakfast  insulin lispro (ADMELOG) corrective regimen sliding scale   SubCutaneous three times a day before meals  insulin lispro (ADMELOG) corrective regimen sliding scale   SubCutaneous at bedtime  insulin lispro Injectable (ADMELOG) 5 Unit(s) SubCutaneous three times a day before meals  lisinopril 40 milliGRAM(s) Oral daily  melatonin 3 milliGRAM(s) Oral at bedtime PRN  metoprolol tartrate 25 milliGRAM(s) Oral two times a day  ondansetron Injectable 4 milliGRAM(s) IV Push every 6 hours PRN     REVIEW OF SYSTEMS:  Limited  SKIN:  leg wound     Physical Exam:  ICU Vital Signs Last 24 Hrs  T(C): 36.6 (24 Apr 2025 06:20), Max: 37.9 (23 Apr 2025 12:23)  T(F): 97.9 (24 Apr 2025 06:20), Max: 100.3 (23 Apr 2025 12:23)  HR: 96 (24 Apr 2025 06:20) (86 - 96)  BP: 163/69 (24 Apr 2025 06:20) (139/61 - 163/69)  RR: 18 (24 Apr 2025 06:20) (18 - 19)  SpO2: 98% (24 Apr 2025 06:20) (98% - 99%)  O2 Parameters below as of 24 Apr 2025 06:20  Patient On (Oxygen Delivery Method): room air  GEN: NAD  HEENT: normocephalic and atraumatic. EOMI. PERRL.    NECK: Supple.  No lymphadenopathy   LUNGS: Clear to auscultation.  HEART: Regular rate and rhythm   ABDOMEN: Soft, nontender, and nondistended.   EXTREMITIES: Without edema.  NEUROLOGIC: grossly intact.  PSYCHIATRIC: Appropriate affect .  SKIN: LLE with 2 areas of gangrenous ulcers, one on ankle on dorsal side and   larger one in posterolateral side of leg, some discharge under dry eschar       Labs:                        9.6    12.61 )-----------( 331      ( 24 Apr 2025 07:48 )             29.6     04-24    139  |  98  |  7   ----------------------------<  279[H]  3.2[L]   |  29  |  0.51    Ca    8.7      24 Apr 2025 07:48  Phos  2.2     04-24  Mg     1.9     04-24    TPro  6.5  /  Alb  2.2[L]  /  TBili  0.4  /  DBili  x   /  AST  25  /  ALT  13  /  AlkPhos  122[H]  04-24    Culture - Urine (collected 04-22-25 @ 03:30)  Source: Catheterized Catheterized  Preliminary Report (04-23-25 @ 14:40):    Culture positive, 10,000 - 49,000 CFU/mL . Identification to follow.    Culture - Wound Aerobic/Anaerobic (collected 04-21-25 @ 15:00)  Source: Swab Swab  Preliminary Report (04-22-25 @ 18:45):    Moderate Pseudomonas aeruginosa  Organism: Pseudomonas aeruginosa (04-23-25 @ 20:24)  Organism: Pseudomonas aeruginosa (04-23-25 @ 20:24)    Sensitivities:      Method Type: HIMA      -  Aztreonam: R >16      -  Cefepime: R >16      -  Ceftazidime: R >16      -  Ciprofloxacin: S <=0.25      -  Imipenem: S <=1      -  Levofloxacin: S <=0.5      -  Meropenem: S <=1      -  Piperacillin/Tazobactam: R >64    Culture - Blood (collected 04-21-25 @ 09:15)  Source: Blood Blood-Peripheral  Preliminary Report (04-23-25 @ 16:01):    No growth at 48 Hours    Culture - Blood (collected 04-21-25 @ 09:10)  Source: Blood Blood-Peripheral  Preliminary Report (04-23-25 @ 16:01):    No growth at 48 Hours    WBC Count: 12.61 K/uL (04-24-25 @ 07:48)  WBC Count: 15.18 K/uL (04-23-25 @ 06:45)  WBC Count: 13.79 K/uL (04-22-25 @ 10:01)  WBC Count: 15.92 K/uL (04-21-25 @ 09:15)    Creatinine: 0.51 mg/dL (04-24-25 @ 07:48)  Creatinine: 0.67 mg/dL (04-23-25 @ 16:40)  Creatinine: 0.56 mg/dL (04-23-25 @ 06:45)  Creatinine: 0.79 mg/dL (04-22-25 @ 10:01)  Creatinine: 0.76 mg/dL (04-21-25 @ 09:15)    C-Reactive Protein: 36 mg/L (04-21-25 @ 09:15)    Sedimentation Rate, Erythrocyte: 65 mm/hr (04-23-25 @ 06:45)  Sedimentation Rate, Erythrocyte: 70 mm/hr (04-21-25 @ 09:15)    COVID-19 PCR: Detected (04-21-25 @ 23:50)    All imaging and data are reviewed.

## 2025-04-24 NOTE — PROGRESS NOTE ADULT - ASSESSMENT
75yo woman with PMH of HTN and DM2, presently residing in rehab center had developed vascular ulcers/gangrene left lower extremity a and she was evaluated as outpatient by vascular surgeon scheduled for surgery today and because of glucose being out of control was sent to the emergency department here at [place] for further evaluation, treatment and management as well as stabilization of patient from a metabolic and glucose standpoint. No recent fever chills nausea vomiting or diarrhea.  She had leukocytosis of 15k in ED but no fever.   ESR 70 and CRP 36     She tested positive for COVID in ED even though no symptoms, not on o2 and CXR negative for pneumonia. Unclear when had covid but since asymptomatic can be watched.   Leg wound looks like burn wound, but also could be vascular with gangrene.      # Vascular ulcers in LLE  # Uncontrolled Diabetes mellitus   # COVID    - Blood Cultures NGTD  - Wound culture with pseudomonas, sensitivity demonstrating resistance to zosyn  - Continue Cipro  - Monitor WBC today 12.61  - Vascular and wound care follow up   - F/u MRSA PCR   - For COVID will watch for now, no need for remdesivir or steroid at this time   - AC as per protocol     Will follow.    Gregory Huitron MD  Division of Infectious Diseases   Please call ID service at [phone] with any question.    50 minutes spent on total encounter assessing patient, examination, chart review, counseling and coordinating care by the attending physician/nurse/care manager.  73yo woman with PMH of HTN and DM2, presently residing in rehab center had developed vascular ulcers/gangrene left lower extremity a and she was evaluated as outpatient by vascular surgeon scheduled for surgery today and because of glucose being out of control was sent to the emergency department here at [place] for further evaluation, treatment and management as well as stabilization of patient from a metabolic and glucose standpoint. No recent fever chills nausea vomiting or diarrhea.  She had leukocytosis of 15k in ED but no fever.   ESR 70 and CRP 36     She tested positive for COVID in ED even though no symptoms, not on o2 and CXR negative for pneumonia. Unclear when had covid but since asymptomatic can be watched.   Low grade fever could be from covid.   Leg wound could be vascular with gangrene, decubitus ulcer is a possibility too.     # Vascular ulcers in LLE  # Uncontrolled Diabetes mellitus   # COVID    - Blood Cultures NGTD  - Wound culture with pseudomonas, sensitivity demonstrating resistance to zosyn  - Stopped zosyn and started on Ciprofloxacin (possibly needs 2 weeks of so  - Monitor WBC today 12.61  - Vascular and wound care follow up   - F/u MRSA PCR   - For COVID will watch for now, no need for remdesivir or steroid at this time   - AC as per protocol     Will follow PRN.    Gregory Huitron MD  Division of Infectious Diseases   Please call ID service at [phone] with any question.    50 minutes spent on total encounter assessing patient, examination, chart review, counseling and coordinating care by the attending physician/nurse/care manager.

## 2025-04-24 NOTE — CHART NOTE - NSCHARTNOTEFT_GEN_A_CORE
Chart reviewed, case discussed with Dr. Plummer.  Family electing to attempt revascularization which is being offered after medical management / optimization.   Please reconsult if clinical status warrants revisiting goc.     Nikki Jose MD, [facility]; Palliative Care Attending, [phone]

## 2025-04-24 NOTE — CONSULT NOTE ADULT - ATTENDING COMMENTS
74-year-old white female  presently residing in rehab center with history of diabetes mellitus on insulin hypertension and hyperlipidemia who had developed vascular ulcers/gangrene left lower extremity, admitted for infection, consulted for new onset afib.     - Presented with fever, Covid PNA  - Patient without symptoms of angina or heart failure at this time.  - Holter Monitor 2022: Sinus with PACs, no significant events   - Stress Testing 2022: Normal study, no EKG changes or chest pain, LVEF 65%   - EK bpm, Atrial fibrillation with rapid ventricular response, Marked ST abnormality, possible inferior subendocardial injury  - Prior EKG with Sinus Rhythm with PACs, HIE [id number]  - Per Tele Monitoring pt has been NSR since initiation of monitoring  - Afib likely reactive given infection   - Repeat EKG to document sinus rhythm   - Continue Metoprolol  - CHADSVASc elevated 5 (HTN, age, female, DM), already on Eliquis 5mg BID for history of DVT  - Needs TTE  - Continue tele monitoring  - No complaints except nausea on exam  - Covid mgmt as per primary  - Needs aggressive K repletion. Keep K>4, Mg>2  - Resume home lasix one potassium normalizes

## 2025-04-24 NOTE — PROGRESS NOTE ADULT - ASSESSMENT
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 Found to have paroxysmal atrial fibrillation, now back in SR.    Infected LLE Ulcers  Cellulitis/SSTI  Severe PAD  - wound cx with pseudomonas - sensitivities noted - resistant to zosyn - changed to ciprofloxacin  - sepsis POA (evolving) secondary to SSTI & COVID - improved  - BCx negative. UCx with growth pending ID  - per vascular- no acute intervention - pt at risk for limb loss  - d/w son Thanh - states family would want attempt at revascularization - states that at this point, they are willing to take any risk and just know that they do not want amputation. Spoke to Dr. Burden 4/24 and told Thanh that no plan for angiogram per vascular. Son states he would like to discuss with vascular again or ultimately seek out second opinion - d/w vascular - per vascular ACPs - Dr. Burden will call son  - continue AC/aspirin + statin  - ID following Dr. Huitron  - Vascular following Dr. Burden  - Disposition planning to [facility] - dependent on vascular & family discussion    COVID infection  - no role for remdesivir/decadron  - supportive care  - ID following    Atrial fibrillation, now in SR  - Afib with RVR 4/23 AM likely reactive secondary to sepsis  - patient without documented history of afib although per outpatient review, had embolic CVA with suspected cardiogenic etiology - although never captured  - suspect patient likely with paroxysmal atrial fibrillation for some time now given history  - continue home apixaban  - stopped amlodipine & changed to lopressor 25mg BID with plan to switch to toprol xl 25mg daily in AM  - severe hypoK and hypoMg/hypoPhos - replete electrolytes aggressively to suppress arrhythmia  - TTE reviewed - normal EF, mild valvular regurgitations, mild DD  - cardiology consult Dr. Hamm appreciated    DM1, uncontrolled  - A1C 7.8  - continue lantus 15u qAM  - continue premeal lispro 5u TID  - continue low dose insulin corrective scale  - monitor BG, hypoglycemia protocol  - endocrine follow up    HTN  - continue lisinopril 40mg  - d/c amlodipine & change to metoprolol as above  - hold lasix for now  - monitor HDs    History of embolic CVA  - continue apixaban + statin    Alzheimer Dementia  - continue donepezil    Prophylactic Measure  - VTE ppx: apixaban    GOC: DNR/DNI

## 2025-04-24 NOTE — PROGRESS NOTE ADULT - SUBJECTIVE AND OBJECTIVE BOX
Patient is a 74y old  Female who presents with a chief complaint of Hyperglycemia (24 Apr 2025 10:08)      FROM ADMISSION H+P:   HPI:  74-year-old white female  presently residing in rehab center with history of diabetes mellitus on insulin hypertension and hyperlipidemia who had developed vascular ulcers/gangrene left lower extremity a and he was evaluated as outpatient by vascular surgeon scheduled for surgery today and because of glucose being out of control was sent to the emergency department here at [facility] for further evaluation care treatment and management as well as stabilization of patient from a metabolic and glucose standpoint.  Patient states that on a good day her sugar runs 180-200.  No recent fever chills nausea vomiting or diarrhea.  ED COURSE:  Vitals: T 97.7  F , 90 HR  ,  /47 , RR 16 , SpO2  100% on RA  Labs significant for: wbc 15.92, ,   Imaging: No acute infilitrate on chest x ray   EKG: NSR, qtc 430  Pt received: 10u admelog, zosyn 3.375 g, vanc 1g, LR 1L   (21 Apr 2025 12:55)      INTERVAL HPI/OVERNIGHT EVENTS: Patient was seen and examined. No acute events occurred overnight. Today a little bit more awake and better color to face. Still reporting some nausea. Son at bedside requesting to speak to vascular regarding potential angiogram.    I&O's Summary    23 Apr 2025 07:01  -  24 Apr 2025 07:00  --------------------------------------------------------  IN: 0 mL / OUT: 801 mL / NET: -801 mL    24 Apr 2025 07:01  -  24 Apr 2025 21:48  --------------------------------------------------------  IN: 0 mL / OUT: 901 mL / NET: -901 mL        PAST MEDICAL & SURGICAL HISTORY:  DM (diabetes mellitus)      HTN (hypertension)      No significant past surgical history          LABS:                        9.6    12.61 )-----------( 331      ( 24 Apr 2025 07:48 )             29.6     04-24    139  |  98  |  7   ----------------------------<  279[H]  3.2[L]   |  29  |  0.51    Ca    8.7      24 Apr 2025 07:48  Phos  2.2     04-24  Mg     1.9     04-24    TPro  6.5  /  Alb  2.2[L]  /  TBili  0.4  /  DBili  x   /  AST  25  /  ALT  13  /  AlkPhos  122[H]  04-24      Urinalysis Basic - ( 24 Apr 2025 07:48 )    Color: x / Appearance: x / SG: x / pH: x  Gluc: 279 mg/dL / Ketone: x  / Bili: x / Urobili: x   Blood: x / Protein: x / Nitrite: x   Leuk Esterase: x / RBC: x / WBC x   Sq Epi: x / Non Sq Epi: x / Bacteria: x      CAPILLARY BLOOD GLUCOSE      POCT Blood Glucose.: 133 mg/dL (24 Apr 2025 21:18)  POCT Blood Glucose.: 140 mg/dL (24 Apr 2025 17:12)  POCT Blood Glucose.: 205 mg/dL (24 Apr 2025 11:35)  POCT Blood Glucose.: 270 mg/dL (24 Apr 2025 08:27)        Urinalysis Basic - ( 24 Apr 2025 07:48 )    Color: x / Appearance: x / SG: x / pH: x  Gluc: 279 mg/dL / Ketone: x  / Bili: x / Urobili: x   Blood: x / Protein: x / Nitrite: x   Leuk Esterase: x / RBC: x / WBC x   Sq Epi: x / Non Sq Epi: x / Bacteria: x        MEDICATIONS  (STANDING):  apixaban 5 milliGRAM(s) Oral every 12 hours  aspirin  chewable 81 milliGRAM(s) Oral daily  atorvastatin 20 milliGRAM(s) Oral at bedtime  ciprofloxacin     Tablet 500 milliGRAM(s) Oral every 12 hours  dextrose 5%. 1000 milliLiter(s) (50 mL/Hr) IV Continuous <Continuous>  dextrose 5%. 1000 milliLiter(s) (100 mL/Hr) IV Continuous <Continuous>  dextrose 50% Injectable 25 Gram(s) IV Push once  dextrose 50% Injectable 12.5 Gram(s) IV Push once  dextrose 50% Injectable 25 Gram(s) IV Push once  donepezil 5 milliGRAM(s) Oral at bedtime  glucagon  Injectable 1 milliGRAM(s) IntraMuscular once  insulin glargine Injectable (LANTUS) 15 Unit(s) SubCutaneous before breakfast  insulin lispro (ADMELOG) corrective regimen sliding scale   SubCutaneous three times a day before meals  insulin lispro (ADMELOG) corrective regimen sliding scale   SubCutaneous at bedtime  insulin lispro Injectable (ADMELOG) 5 Unit(s) SubCutaneous three times a day before meals  lisinopril 40 milliGRAM(s) Oral daily    MEDICATIONS  (PRN):  acetaminophen     Tablet .. 650 milliGRAM(s) Oral every 6 hours PRN Temp greater or equal to 38C (100.4F), Mild Pain (1 - 3)  dextrose Oral Gel 15 Gram(s) Oral once PRN Blood Glucose LESS THAN 70 milliGRAM(s)/deciliter  dextrose Oral Gel 15 Gram(s) Oral once PRN Blood Glucose LESS THAN 70 milliGRAM(s)/deciliter  melatonin 3 milliGRAM(s) Oral at bedtime PRN Insomnia  ondansetron Injectable 4 milliGRAM(s) IV Push every 6 hours PRN Nausea and/or Vomiting      REVIEW OF SYSTEMS:  unable to obtain due to mental status    RADIOLOGY & ADDITIONAL TESTS:    Imaging Personally Reviewed:  [x] YES  [ ] NO    Consultant(s) Notes Reviewed:  [x] YES  [ ] NO    PHYSICAL EXAM:  T(C): 36.6 (04-24-25 @ 20:51), Max: 37.1 (04-24-25 @ 14:50)  HR: 90 (04-24-25 @ 20:51) (70 - 96)  BP: 163/69 (04-24-25 @ 20:51) (163/69 - 166/72)  RR: 18 (04-24-25 @ 20:51) (18 - 18)  SpO2: 97% (04-24-25 @ 20:51) (97% - 99%)    GENERAL: NAD  HEENT:  anicteric, moist mucous membranes, secretions noted on lips  CHEST/LUNG:  CTA b/l, no rales, wheezes, or rhonchi  HEART:  RRR, S1, S2  ABDOMEN:  BS+, soft, nontender, nondistended  EXTREMITIES: LLE bandaged and with splint on today  NERVOUS SYSTEM: sleepy, answers some questions and follows commands  PSYCH: normal affect    Care Discussed with Consultants/Other Providers [x] YES  [ ] NO

## 2025-04-25 LAB
ALBUMIN SERPL ELPH-MCNC: 2.3 G/DL — LOW (ref 3.3–5)
ALP SERPL-CCNC: 126 U/L — HIGH (ref 40–120)
ALT FLD-CCNC: 15 U/L — SIGNIFICANT CHANGE UP (ref 12–78)
ANION GAP SERPL CALC-SCNC: 7 MMOL/L — SIGNIFICANT CHANGE UP (ref 5–17)
AST SERPL-CCNC: 20 U/L — SIGNIFICANT CHANGE UP (ref 15–37)
BASOPHILS # BLD AUTO: 0.01 K/UL — SIGNIFICANT CHANGE UP (ref 0–0.2)
BASOPHILS NFR BLD AUTO: 0.1 % — SIGNIFICANT CHANGE UP (ref 0–2)
BILIRUB SERPL-MCNC: 0.4 MG/DL — SIGNIFICANT CHANGE UP (ref 0.2–1.2)
BUN SERPL-MCNC: 8 MG/DL — SIGNIFICANT CHANGE UP (ref 7–23)
CALCIUM SERPL-MCNC: 9 MG/DL — SIGNIFICANT CHANGE UP (ref 8.5–10.1)
CHLORIDE SERPL-SCNC: 102 MMOL/L — SIGNIFICANT CHANGE UP (ref 96–108)
CO2 SERPL-SCNC: 29 MMOL/L — SIGNIFICANT CHANGE UP (ref 22–31)
CREAT SERPL-MCNC: 0.47 MG/DL — LOW (ref 0.5–1.3)
EGFR: 99 ML/MIN/1.73M2 — SIGNIFICANT CHANGE UP
EGFR: 99 ML/MIN/1.73M2 — SIGNIFICANT CHANGE UP
EOSINOPHIL # BLD AUTO: 0 K/UL — SIGNIFICANT CHANGE UP (ref 0–0.5)
EOSINOPHIL NFR BLD AUTO: 0 % — SIGNIFICANT CHANGE UP (ref 0–6)
GLUCOSE SERPL-MCNC: 274 MG/DL — HIGH (ref 70–99)
HCT VFR BLD CALC: 33.6 % — LOW (ref 34.5–45)
HGB BLD-MCNC: 10.7 G/DL — LOW (ref 11.5–15.5)
IMM GRANULOCYTES NFR BLD AUTO: 0.4 % — SIGNIFICANT CHANGE UP (ref 0–0.9)
LYMPHOCYTES # BLD AUTO: 0.61 K/UL — LOW (ref 1–3.3)
LYMPHOCYTES # BLD AUTO: 5.8 % — LOW (ref 13–44)
MAGNESIUM SERPL-MCNC: 2 MG/DL — SIGNIFICANT CHANGE UP (ref 1.6–2.6)
MCHC RBC-ENTMCNC: 28.8 PG — SIGNIFICANT CHANGE UP (ref 27–34)
MCHC RBC-ENTMCNC: 31.8 G/DL — LOW (ref 32–36)
MCV RBC AUTO: 90.3 FL — SIGNIFICANT CHANGE UP (ref 80–100)
MONOCYTES # BLD AUTO: 0.55 K/UL — SIGNIFICANT CHANGE UP (ref 0–0.9)
MONOCYTES NFR BLD AUTO: 5.2 % — SIGNIFICANT CHANGE UP (ref 2–14)
NEUTROPHILS # BLD AUTO: 9.38 K/UL — HIGH (ref 1.8–7.4)
NEUTROPHILS NFR BLD AUTO: 88.5 % — HIGH (ref 43–77)
NRBC BLD AUTO-RTO: 0 /100 WBCS — SIGNIFICANT CHANGE UP (ref 0–0)
PHOSPHATE SERPL-MCNC: 2.5 MG/DL — SIGNIFICANT CHANGE UP (ref 2.5–4.5)
PLATELET # BLD AUTO: 328 K/UL — SIGNIFICANT CHANGE UP (ref 150–400)
POTASSIUM SERPL-MCNC: 3.3 MMOL/L — LOW (ref 3.5–5.3)
POTASSIUM SERPL-SCNC: 3.3 MMOL/L — LOW (ref 3.5–5.3)
PROT SERPL-MCNC: 6.7 G/DL — SIGNIFICANT CHANGE UP (ref 6–8.3)
RBC # BLD: 3.72 M/UL — LOW (ref 3.8–5.2)
RBC # FLD: 14 % — SIGNIFICANT CHANGE UP (ref 10.3–14.5)
SODIUM SERPL-SCNC: 138 MMOL/L — SIGNIFICANT CHANGE UP (ref 135–145)
TSH SERPL-MCNC: 2.36 UIU/ML — SIGNIFICANT CHANGE UP (ref 0.36–3.74)
WBC # BLD: 10.59 K/UL — HIGH (ref 3.8–10.5)
WBC # FLD AUTO: 10.59 K/UL — HIGH (ref 3.8–10.5)

## 2025-04-25 PROCEDURE — 99232 SBSQ HOSP IP/OBS MODERATE 35: CPT

## 2025-04-25 PROCEDURE — 99233 SBSQ HOSP IP/OBS HIGH 50: CPT

## 2025-04-25 RX ORDER — METOPROLOL SUCCINATE 50 MG/1
25 TABLET, EXTENDED RELEASE ORAL ONCE
Refills: 0 | Status: COMPLETED | OUTPATIENT
Start: 2025-04-25 | End: 2025-04-25

## 2025-04-25 RX ORDER — METOPROLOL SUCCINATE 50 MG/1
50 TABLET, EXTENDED RELEASE ORAL DAILY
Refills: 0 | Status: DISCONTINUED | OUTPATIENT
Start: 2025-04-26 | End: 2025-04-29

## 2025-04-25 RX ORDER — FUROSEMIDE 10 MG/ML
1 INJECTION INTRAMUSCULAR; INTRAVENOUS
Refills: 0 | DISCHARGE

## 2025-04-25 RX ORDER — RAMIPRIL 2.5 MG/1
1 CAPSULE ORAL
Refills: 0 | DISCHARGE

## 2025-04-25 RX ORDER — BUTYROSPERMUM PARKII(SHEA BUTTER), SIMMONDSIA CHINENSIS (JOJOBA) SEED OIL, ALOE BARBADENSIS LEAF EXTRACT .01; 1; 3.5 G/100G; G/100G; G/100G
250 LIQUID TOPICAL
Refills: 0 | Status: DISCONTINUED | OUTPATIENT
Start: 2025-04-25 | End: 2025-04-29

## 2025-04-25 RX ADMIN — METOPROLOL SUCCINATE 25 MILLIGRAM(S): 50 TABLET, EXTENDED RELEASE ORAL at 07:48

## 2025-04-25 RX ADMIN — Medication 500 MILLIGRAM(S): at 07:47

## 2025-04-25 RX ADMIN — LISINOPRIL 40 MILLIGRAM(S): 5 TABLET ORAL at 07:47

## 2025-04-25 RX ADMIN — Medication 40 MILLIEQUIVALENT(S): at 18:01

## 2025-04-25 RX ADMIN — ATORVASTATIN CALCIUM 20 MILLIGRAM(S): 80 TABLET, FILM COATED ORAL at 21:25

## 2025-04-25 RX ADMIN — METOPROLOL SUCCINATE 25 MILLIGRAM(S): 50 TABLET, EXTENDED RELEASE ORAL at 09:02

## 2025-04-25 RX ADMIN — Medication 500 MILLIGRAM(S): at 18:01

## 2025-04-25 RX ADMIN — APIXABAN 5 MILLIGRAM(S): 2.5 TABLET, FILM COATED ORAL at 07:47

## 2025-04-25 RX ADMIN — INSULIN LISPRO 3: 100 INJECTION, SOLUTION INTRAVENOUS; SUBCUTANEOUS at 08:58

## 2025-04-25 RX ADMIN — Medication 4 MILLIGRAM(S): at 09:00

## 2025-04-25 RX ADMIN — Medication 4 MILLIGRAM(S): at 19:42

## 2025-04-25 RX ADMIN — INSULIN LISPRO 2: 100 INJECTION, SOLUTION INTRAVENOUS; SUBCUTANEOUS at 12:53

## 2025-04-25 RX ADMIN — Medication 40 MILLIEQUIVALENT(S): at 12:56

## 2025-04-25 RX ADMIN — Medication 81 MILLIGRAM(S): at 12:53

## 2025-04-25 RX ADMIN — Medication 3 MILLIGRAM(S): at 21:25

## 2025-04-25 RX ADMIN — INSULIN GLARGINE-YFGN 15 UNIT(S): 100 INJECTION, SOLUTION SUBCUTANEOUS at 08:59

## 2025-04-25 RX ADMIN — APIXABAN 5 MILLIGRAM(S): 2.5 TABLET, FILM COATED ORAL at 18:01

## 2025-04-25 RX ADMIN — DONEPEZIL HYDROCHLORIDE 5 MILLIGRAM(S): 5 TABLET ORAL at 21:25

## 2025-04-25 RX ADMIN — Medication 40 MILLIEQUIVALENT(S): at 16:40

## 2025-04-25 NOTE — CAREGIVER ENGAGEMENT NOTE - CAREGIVER OUTREACH NOTES - FREE TEXT
RUPALI contacted pt's son Thanh regarding dc planning. He is requesting that pt dc back to [place] but would like pt to return on Monday. RUPALI to follow for MD clearance. RUPALI discussed hospital IMM and he is understanding that pt will be provided with IMM over the weekend if he is medically stable. SW to follow and remain available for any needs.

## 2025-04-25 NOTE — PROGRESS NOTE ADULT - SUBJECTIVE AND OBJECTIVE BOX
CAPILLARY BLOOD GLUCOSE      POCT Blood Glucose.: 133 mg/dL (24 Apr 2025 21:18)  POCT Blood Glucose.: 140 mg/dL (24 Apr 2025 17:12)  POCT Blood Glucose.: 205 mg/dL (24 Apr 2025 11:35)  POCT Blood Glucose.: 270 mg/dL (24 Apr 2025 08:27)      Vital Signs Last 24 Hrs  T(C): 36.5 (25 Apr 2025 04:21), Max: 37.1 (24 Apr 2025 14:50)  T(F): 97.7 (25 Apr 2025 04:21), Max: 98.8 (24 Apr 2025 14:50)  HR: 100 (25 Apr 2025 04:21) (70 - 100)  BP: 170/87 (25 Apr 2025 04:21) (163/69 - 170/87)  BP(mean): --  RR: 18 (25 Apr 2025 04:21) (18 - 18)  SpO2: 98% (25 Apr 2025 04:21) (97% - 99%)    Parameters below as of 25 Apr 2025 04:21  Patient On (Oxygen Delivery Method): room air        General: WN/WD NAD  Respiratory: CTA B/L  CV: RRR, S1S2, no murmurs, rubs or gallops  Abdominal: Soft, NT, ND +BS, Last BM  Extremities: No edema, + peripheral pulses     04-24    139  |  98  |  7   ----------------------------<  279[H]  3.2[L]   |  29  |  0.51    Ca    8.7      24 Apr 2025 07:48  Phos  2.2     04-24  Mg     1.9     04-24    TPro  6.5  /  Alb  2.2[L]  /  TBili  0.4  /  DBili  x   /  AST  25  /  ALT  13  /  AlkPhos  122[H]  04-24      atorvastatin 20 milliGRAM(s) Oral at bedtime  dextrose 50% Injectable 25 Gram(s) IV Push once  dextrose 50% Injectable 12.5 Gram(s) IV Push once  dextrose 50% Injectable 25 Gram(s) IV Push once  dextrose Oral Gel 15 Gram(s) Oral once PRN  dextrose Oral Gel 15 Gram(s) Oral once PRN  glucagon  Injectable 1 milliGRAM(s) IntraMuscular once  insulin glargine Injectable (LANTUS) 15 Unit(s) SubCutaneous before breakfast  insulin lispro (ADMELOG) corrective regimen sliding scale   SubCutaneous three times a day before meals  insulin lispro (ADMELOG) corrective regimen sliding scale   SubCutaneous at bedtime  insulin lispro Injectable (ADMELOG) 5 Unit(s) SubCutaneous three times a day before meals

## 2025-04-25 NOTE — PROGRESS NOTE ADULT - SUBJECTIVE AND OBJECTIVE BOX
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  Being managed for infected arterial ulcers as well as asymptomatic COVID-19 infection with hyperglycemia and uncontrolled underlying diabetes mellitus with generalized weakness and MRSA colonization    Patient Seen and examined   WBC improved, afebrile, hemodynamically stable  Trend WBC and temperature curve through hospital course    Cultures: blood culture: 2 sets: April 21: Negative to date, wound culture: April 21: Positive for Pseudomonas aeruginosa with urine culture contaminant positive for Candida  MRSA screen positive    Imaging:   Xray Chest 1 View- PORTABLE-Urgent (04.21.25 @ 09:33) No acute infiltrate.  VA Duplex Lower Ext Vein Scan, Bilat (04.21.25 @ 14:14) No evidence of deep venous thrombosis in the visualized deep veins of either lower extremity noted above.    Antibiotics:    continue with ciprofloxacin 500 mg twice daily pending procedure and questionable intervention from vascular surgery   might need extended antibiotic course close to 2 weeks  Diabetes management as per primary team  No indication for remdesivir/steroid for COVID-19 asymptomatic infection for now, continue with Airborne isolation as per protocol   QTc 465 on April 25;  recommend repeating prior to discharge  ESR 70 and CRP 36      oral floroquinolones are chelated by multi-Valent cations hence avoid concomitant oral calcium (dairy products), iron, magnesium or other multi Valent cations an hour before or for an hour after dosing  Potential serious side effects  including but not limited to C. difficile infection, tendon rupture  explained to the patient at length    Daily CBC, CMP, Mg, PO4 while on Antibiotics     Plan explained to patient   D/w Primary team     ________________________________    Last 24 hours:    no overnight events  Besides on palpation pain on left lower extremity patient denied any complaints  Extremely weak and decreased p.o. intake    Further ROS:  All systems reviewed. No other complaints besides mentioned above     ______________________________  Allergies    No Known Allergies     medications:  ciprofloxacin     Tablet 500 milliGRAM(s) Oral every 12 hours  acetaminophen     Tablet .. 650 milliGRAM(s) Oral every 6 hours PRN  apixaban 5 milliGRAM(s) Oral every 12 hours  aspirin  chewable 81 milliGRAM(s) Oral daily  atorvastatin 20 milliGRAM(s) Oral at bedtime  dextrose 5%. 1000 milliLiter(s) IV Continuous <Continuous>  dextrose 5%. 1000 milliLiter(s) IV Continuous <Continuous>  dextrose 50% Injectable 25 Gram(s) IV Push once  dextrose 50% Injectable 12.5 Gram(s) IV Push once  dextrose 50% Injectable 25 Gram(s) IV Push once  dextrose Oral Gel 15 Gram(s) Oral once PRN  dextrose Oral Gel 15 Gram(s) Oral once PRN  donepezil 5 milliGRAM(s) Oral at bedtime  glucagon  Injectable 1 milliGRAM(s) IntraMuscular once  insulin glargine Injectable (LANTUS) 15 Unit(s) SubCutaneous before breakfast  insulin lispro (ADMELOG) corrective regimen sliding scale   SubCutaneous three times a day before meals  insulin lispro (ADMELOG) corrective regimen sliding scale   SubCutaneous at bedtime  insulin lispro Injectable (ADMELOG) 5 Unit(s) SubCutaneous three times a day before meals  lisinopril 40 milliGRAM(s) Oral daily  melatonin 3 milliGRAM(s) Oral at bedtime PRN  ondansetron Injectable 4 milliGRAM(s) IV Push every 6 hours PRN  _________________    PHYSICAL EXAM:    Objective:  Vital Signs Last 24 Hrs  T(C): 36.5 (25 Apr 2025 04:21), Max: 37.1 (24 Apr 2025 14:50)  T(F): 97.7 (25 Apr 2025 04:21), Max: 98.8 (24 Apr 2025 14:50)  HR: 100 (25 Apr 2025 04:21) (70 - 100)  BP: 158/80 (25 Apr 2025 09:00) (158/80 - 170/87)  RR: 18 (25 Apr 2025 04:21) (18 - 18)  SpO2: 98% (25 Apr 2025 04:21) (97% - 99%)  Parameters below as of 25 Apr 2025 04:21  Patient On (Oxygen Delivery Method): room air    General: No acute distress.   Sitting in bed comfortably   Neuro: AAO*2, No motor, sensory, or cranial nerve deficit  HEENT: Pupils equal, reactive to light, Oral mucosa  extremely dry  PULM: Clear to auscultation bilaterally, no significant adventitious breath sounds , decreased breath sounds at bilateral bases  CVS: Regular rhythm and controlled rate, no murmurs, rubs, or gallops  ABD: Soft, nondistended, nontender, normoactive bowel sounds, no CVA tenderness  EXT: No b/l LE edema, nontender with pedal pulse palpable; LLE with 2 areas of gangrenous ulcers, one on ankle on dorsal side and larger one in posterolateral side of leg, some discharge under dry eschar   SKIN: Warm and well perfused, no acute rashes   ______________  LABS, MICROBIOLOGY, RADIOLOGY & ADDITIONAL STUDIES: Reviewed

## 2025-04-25 NOTE — PROGRESS NOTE ADULT - SUBJECTIVE AND OBJECTIVE BOX
[facility] -- Chikis Sanders Pannella, Patel, Savella, Goodger, Cohen: Office # [phone]    Follow Up: New onset A fib      Subjective/Observations: Patient seen and examined. Patient awake, alert, resting in bed. No complaints of chest pain, dyspnea, palpitations or dizziness. No signs of orthopnea or PND. Tolerating room air. + nausea.     REVIEW OF SYSTEMS: All other review of systems are negative unless indicated above    PAST MEDICAL & SURGICAL HISTORY:  DM (diabetes mellitus)      HTN (hypertension)      No significant past surgical history      MEDICATIONS  (STANDING):  apixaban 5 milliGRAM(s) Oral every 12 hours  aspirin  chewable 81 milliGRAM(s) Oral daily  atorvastatin 20 milliGRAM(s) Oral at bedtime  ciprofloxacin     Tablet 500 milliGRAM(s) Oral every 12 hours  dextrose 5%. 1000 milliLiter(s) (50 mL/Hr) IV Continuous <Continuous>  dextrose 5%. 1000 milliLiter(s) (100 mL/Hr) IV Continuous <Continuous>  dextrose 50% Injectable 25 Gram(s) IV Push once  dextrose 50% Injectable 12.5 Gram(s) IV Push once  dextrose 50% Injectable 25 Gram(s) IV Push once  donepezil 5 milliGRAM(s) Oral at bedtime  glucagon  Injectable 1 milliGRAM(s) IntraMuscular once  insulin glargine Injectable (LANTUS) 15 Unit(s) SubCutaneous before breakfast  insulin lispro (ADMELOG) corrective regimen sliding scale   SubCutaneous three times a day before meals  insulin lispro (ADMELOG) corrective regimen sliding scale   SubCutaneous at bedtime  insulin lispro Injectable (ADMELOG) 5 Unit(s) SubCutaneous three times a day before meals  lisinopril 40 milliGRAM(s) Oral daily    MEDICATIONS  (PRN):  acetaminophen     Tablet .. 650 milliGRAM(s) Oral every 6 hours PRN Temp greater or equal to 38C (100.4F), Mild Pain (1 - 3)  dextrose Oral Gel 15 Gram(s) Oral once PRN Blood Glucose LESS THAN 70 milliGRAM(s)/deciliter  dextrose Oral Gel 15 Gram(s) Oral once PRN Blood Glucose LESS THAN 70 milliGRAM(s)/deciliter  melatonin 3 milliGRAM(s) Oral at bedtime PRN Insomnia  ondansetron Injectable 4 milliGRAM(s) IV Push every 6 hours PRN Nausea and/or Vomiting    Allergies    No Known Allergies    Intolerances      Vital Signs Last 24 Hrs  T(C): 36.5 (25 Apr 2025 04:21), Max: 37.1 (24 Apr 2025 14:50)  T(F): 97.7 (25 Apr 2025 04:21), Max: 98.8 (24 Apr 2025 14:50)  HR: 100 (25 Apr 2025 04:21) (70 - 100)  BP: 158/80 (25 Apr 2025 09:00) (158/80 - 170/87)  BP(mean): --  RR: 18 (25 Apr 2025 04:21) (18 - 18)  SpO2: 98% (25 Apr 2025 04:21) (97% - 99%)    Parameters below as of 25 Apr 2025 04:21  Patient On (Oxygen Delivery Method): room air      I&O's Summary    24 Apr 2025 07:01  -  25 Apr 2025 07:00  --------------------------------------------------------  IN: 0 mL / OUT: 1703 mL / NET: -1703 mL          TELE: Sr 70-100s , nonsustained PAT 130s, 140s, 160s x 1   PHYSICAL EXAM:  Constitutional: NAD, awake and alert  HEENT: Moist Mucous Membranes, Anicteric  Pulmonary: Non-labored, breath sounds are clear bilaterally, No wheezing, rales or rhonchi  Cardiovascular: Regular, S1 and S2, No murmurs, No rubs, gallops or clicks  Gastrointestinal:  soft, nontender, nondistended   Lymph: No peripheral edema. No lymphadenopathy.   Skin: No visible rashes, LLE DSD  Psych:  Mood & affect appropriate      LABS: All Labs Reviewed:                        10.7   10.59 )-----------( 328      ( 25 Apr 2025 08:25 )             33.6                         9.6    12.61 )-----------( 331      ( 24 Apr 2025 07:48 )             29.6                         9.1    15.18 )-----------( 330      ( 23 Apr 2025 06:45 )             28.2     24 Apr 2025 07:48    139    |  98     |  7      ----------------------------<  279    3.2     |  29     |  0.51   23 Apr 2025 16:40    140    |  99     |  7      ----------------------------<  111    3.4     |  33     |  0.67   23 Apr 2025 06:45    141    |  102    |  8      ----------------------------<  234    2.6     |  28     |  0.56     Ca    8.7        24 Apr 2025 07:48  Ca    8.7        23 Apr 2025 16:40  Ca    8.9        23 Apr 2025 06:45  Phos  2.2       24 Apr 2025 07:48  Phos  2.1       23 Apr 2025 06:45  Mg     1.9       24 Apr 2025 07:48  Mg     1.5       23 Apr 2025 06:45    TPro  6.5    /  Alb  2.2    /  TBili  0.4    /  DBili  x      /  AST  25     /  ALT  13     /  AlkPhos  122    24 Apr 2025 07:48  TPro  6.0    /  Alb  2.2    /  TBili  0.5    /  DBili  x      /  AST  12     /  ALT  10     /  AlkPhos  99     22 Apr 2025 10:01   LIVER FUNCTIONS - ( 24 Apr 2025 07:48 )  Alb: 2.2 g/dL / Pro: 6.5 g/dL / ALK PHOS: 122 U/L / ALT: 13 U/L / AST: 25 U/L / GGT: x           Lactate, Blood: 1.2 mmol/L (04-23-25 @ 06:45)    12 Lead ECG:   Ventricular Rate 83 BPM    Atrial Rate 83 BPM    P-R Interval 122 ms    QRS Duration 84 ms    Q-T Interval 396 ms    QTC Calculation(Bazett) 465 ms    P Axis 83 degrees    R Axis 45 degrees    T Axis 50 degrees    Diagnosis Line Normal sinus rhythm  Nonspecific ST abnormality  Abnormal ECG  Confirmed by Sara Hamm ([id number]) on 4/24/2025 1:52:11 PM (04-24-25 @ 10:08)      TRANSTHORACIC ECHOCARDIOGRAM REPORT  ________________________________________________________________________________                                      _______       Pt. Name:       TERESA MCHUGH Study Date:    4/24/2025  MRN:            [id number]       YOB: 1950  Accession #:    [id number]       Age:           74 years  Account#:       [id number]      Gender:        F  Heart Rate:                     Height:        62.99 in (160.00 cm)  Rhythm:                         Weight:        125.66 lb (57.00 kg)  Blood Pressure: 163/69 mmHg     BSA/BMI:       1.59 m² / 22.27 kg/m²  ________________________________________________________________________________________  Referring Physician:    [id number] Navid Plummer  Interpreting Physician: Lopez Cano M.D.  Primary Sonographer:    Agustin Quesada    CPT:               ECHO TTE WO CON COMP W DOPP - 25721.m  Indication(s):     Unspecified atrial fibrillation - I48.91  Procedure:         Transthoracic echocardiogram with 2-D, M-mode and complete                     spectral and color flow Doppler.  Ordering Location: [facility]  Admission Status:  Inpatient  Study Information: Image quality for this study is technically difficult.    _______________________________________________________________________________________     CONCLUSIONS:      1. Technically difficult image quality.   2. Left ventricular cavity is normal in size. Left ventricular systolic function is normal with an ejection fraction of 64 % by Pond's method of disks.   3. Normal right ventricular cavity size and normal right ventricular systolic function.   4. Mild mitral regurgitation.   5. Mild tricuspid regurgitation.   6. There is focal calcification of the aortic valve leaflets.   7. Estimated pulmonary artery systolic pressure is 29 mmHg.   8. There is mild (grade 1) left ventricular diastolic dysfunction.    ________________________________________________________________________________________  FINDINGS:     Left Ventricle:  The left ventricular cavity is normal in size. Left ventricular systolic function is normal with a calculated ejection fraction of 64 % by the Pond's biplane method of disks. There is normal LV mass and concentric remodeling. There is mild (grade 1) left ventricular diastolic dysfunction.     Right Ventricle:  The right ventricular cavity is normal in size and right ventricular systolic function is normal. Tricuspid annular plane systolic excursion (TAPSE) is 2.0 cm (normal >=1.7 cm).     Left Atrium:  The left atrium is normal in size.     Right Atrium:  The right atrium is normal in size with an indexed volume of 13.61 ml/m² and an indexed area of 6.36 cm²/m².     Interatrial Septum:  The interatrial septum appears intact.     Aortic Valve:  The aortic valve appears trileaflet with normal systolic excursion. There is focal calcification of the aortic valve leaflets. There is fibrocalcific aortic valve sclerosis without stenosis. There is no evidence of aortic regurgitation.     Mitral Valve:  There is mitral valve thickening of the anterior and posterior leaflets. There is calcification of the mitral valve annulus. There is mild mitral regurgitation.     Tricuspid Valve:  Structurally normal tricuspid valve with normal leaflet excursion. Thereis mild tricuspid regurgitation. Estimated pulmonary artery systolic pressure is 29 mmHg.     Pulmonic Valve:  The pulmonic valve was not well visualized.     Aorta:  The aortic root at the sinuses of Valsalva is normal in size, measuring 2.90 cm (indexed 1.83 cm/m²). The ascending aorta is normal in size, measuring 2.80 cm (indexed 1.76 cm/m²). The aortic arch diameter is normal in size, measuring 2.6 cm (indexed 1.64 cm/m²).     Pericardium:  No pericardial effusion seen.     Systemic Veins:  The inferior vena cava is normal in size measuring 1.14 cm in diameter, (normal <2.1cm) with normal inspiratory collapse (normal >50%) consistent with normal right atrial pressure (~3, range 0-5mmHg).  ____________________________________________________________________  QUANTITATIVE DATA:  Left Ventricle Measurements: (Indexed to BSA)     IVSd (2D):   1.1 cm  LVPWd (2D):  1.0 cm  LVIDd (2D):  3.9 cm  LVIDs (2D):  2.6 cm  LV Mass:     139 g  87.8 g/m²  LV Vol d, MOD A2C: 47.2 ml 29.74 ml/m²  LV Vold, MOD A4C: 53.1 ml 33.46 ml/m²  LV Vol d, MOD BP:  53.1 ml 33.44 ml/m²  LV Vol s, MOD A2C: 17.8 ml 11.22 ml/m²  LV Vol s, MOD A4C: 19.7 ml 12.41 ml/m²  LV Vol s, MOD BP:  19.3 ml 12.18 ml/m²  LVOT SV MOD BP:    33.7 ml  LV EF% MOD BP:     64 %     MV E Vmax:    0.98 m/s  MV A Vmax:    1.35 m/s  MV E/A:       0.73  e' lateral:   8.81 cm/s  e' medial:    9.79 cm/s  E/e' lateral: 11.12  E/e' medial:  10.01  E/e' Average: 10.54  MV DT:        218 msec    Aorta Measurements: (Normal range) (Indexed to BSA)     Ao Root d     2.90 cm (2.7 - 3.3 cm) 1.83 cm/m²  Ao Asc d, 2D: 2.80  Ao Asc prox:  2.80 cm                1.76 cm/m²  Ao Arch:      2.6 cm    Left Atrium Measurements: (Indexed to BSA)  LA Diam 2D: 2.90 cm    Right Ventricle Measurements: Right Atrial Measurements:     TAPSE:           2.0 cm       RA Vol s, MOD A4C         21.6 ml  RV Base (RVID1): 3.6 cm       RA Vol s, MOD A4C i BSA   13.61 ml/m²  RV Mid (RVID2):  2.7 cm       RA Area s, MOD A4C        10.1 cm²                       RA Area s, MOD A4C, i BSA 6.36 cm²/m²       LVOT / RVOT/ Qp/Qs Data: (Indexed to BSA)  LVOT Diameter,s: 1.90 cm  LVOT Area:       2.84 cm²  LVOT Vmax:       1.16 m/s  LVOT Vmn:        0.749 m/s  LVOT VTI:        22.30 cm  LVOT peak grad:  5 mmHg  LVOT mean grad:  2.0 mmHg  LVOT SV:         63.2 ml   39.84 ml/m²    Aortic Valve Measurements:  AV Vmax:                1.2 m/s  AV Peak Gradient:       6.1 mmHg  AV Mean Gradient:       3.0 mmHg  AV VTI:                 24.4 cm  AVVTI Ratio:           0.91  AoV EOA, Contin:        2.59 cm²  AoV EOA, Contin i:      1.63 cm²/m²  AoV area, (CE):         2.59 cm²  AoV area, (CE), i:      1.63 cm²/m²  AoV Dimensionless Index 0.91    Mitral Valve Measurements:     MV E Vmax: 1.0 m/s        MR Vmax:          2.67 m/s  MV A Vmax: 1.4 m/s         MR Peak Gradient: 28.5 mmHg  MV E/A:    0.7       Tricuspid Valve Measurements:     TR Vmax:          2.6 m/s  TR Peak Gradient: 26.0 mmHg  RA Pressure:      3 mmHg  PASP:             29 mmHg    ________________________________________________________________________________________  Electronically signed on 4/24/2025 at 2:29:29 PM by Lopez Cano M.D.         *** Final ***

## 2025-04-25 NOTE — PROGRESS NOTE ADULT - SUBJECTIVE AND OBJECTIVE BOX
Patient is a 74y old  Female who presents with a chief complaint of Hyperglycemia (25 Apr 2025 10:58)      FROM ADMISSION H+P:   HPI:  74-year-old white female  presently residing in rehab center with history of diabetes mellitus on insulin hypertension and hyperlipidemia who had developed vascular ulcers/gangrene left lower extremity a and he was evaluated as outpatient by vascular surgeon scheduled for surgery today and because of glucose being out of control was sent to the emergency department here at [facility] for further evaluation care treatment and management as well as stabilization of patient from a metabolic and glucose standpoint.  Patient states that on a good day her sugar runs 180-200.  No recent fever chills nausea vomiting or diarrhea.  ED COURSE:  Vitals: T 97.7  F , 90 HR  ,  /47 , RR 16 , SpO2  100% on RA  Labs significant for: wbc 15.92, ,   Imaging: No acute infilitrate on chest x ray   EKG: NSR, qtc 430  Pt received: 10u admelog, zosyn 3.375 g, vanc 1g, LR 1L   (21 Apr 2025 12:55)      INTERVAL HPI/OVERNIGHT EVENTS: Patient was seen and examined. Noted nonsustained episodes of PAT/SVT to 160s overnight. No atrial fibrillation seen since start of telemetry monitoring. All repeat EKGs have been SR. Pt still reporting nausea and feeling weak. On room air. Spoke to son Thanh outside room. He reports that he spoke to Dr. Burden and is agreement for deferring angiogram at this time. States that he agrees with vascular's thought process and reasoning. Curious about discharge planning outlook from here on out in regards to how rehab will address antibiotic treatment (on oral ciprofloxacin). Son states that he is a medical device rep and understands how medical field operates.    I&O's Summary    24 Apr 2025 07:01  -  25 Apr 2025 07:00  --------------------------------------------------------  IN: 0 mL / OUT: 1703 mL / NET: -1703 mL    25 Apr 2025 07:01  -  25 Apr 2025 21:19  --------------------------------------------------------  IN: 0 mL / OUT: 400 mL / NET: -400 mL        PAST MEDICAL & SURGICAL HISTORY:  DM (diabetes mellitus)      Multiple sclerosis      DM (diabetes mellitus)      HTN (hypertension)      No significant past surgical history          LABS:                        10.7   10.59 )-----------( 328      ( 25 Apr 2025 08:25 )             33.6     04-25    138  |  102  |  8   ----------------------------<  274[H]  3.3[L]   |  29  |  0.47[L]    Ca    9.0      25 Apr 2025 08:25  Phos  2.5     04-25  Mg     2.0     04-25    TPro  6.7  /  Alb  2.3[L]  /  TBili  0.4  /  DBili  x   /  AST  20  /  ALT  15  /  AlkPhos  126[H]  04-25      Urinalysis Basic - ( 25 Apr 2025 08:25 )    Color: x / Appearance: x / SG: x / pH: x  Gluc: 274 mg/dL / Ketone: x  / Bili: x / Urobili: x   Blood: x / Protein: x / Nitrite: x   Leuk Esterase: x / RBC: x / WBC x   Sq Epi: x / Non Sq Epi: x / Bacteria: x      CAPILLARY BLOOD GLUCOSE      POCT Blood Glucose.: 123 mg/dL (25 Apr 2025 21:10)  POCT Blood Glucose.: 117 mg/dL (25 Apr 2025 17:48)  POCT Blood Glucose.: 232 mg/dL (25 Apr 2025 12:24)  POCT Blood Glucose.: 268 mg/dL (25 Apr 2025 08:32)        Urinalysis Basic - ( 25 Apr 2025 08:25 )    Color: x / Appearance: x / SG: x / pH: x  Gluc: 274 mg/dL / Ketone: x  / Bili: x / Urobili: x   Blood: x / Protein: x / Nitrite: x   Leuk Esterase: x / RBC: x / WBC x   Sq Epi: x / Non Sq Epi: x / Bacteria: x        MEDICATIONS  (STANDING):  apixaban 5 milliGRAM(s) Oral every 12 hours  aspirin  chewable 81 milliGRAM(s) Oral daily  atorvastatin 20 milliGRAM(s) Oral at bedtime  ciprofloxacin     Tablet 500 milliGRAM(s) Oral every 12 hours  dextrose 5%. 1000 milliLiter(s) (50 mL/Hr) IV Continuous <Continuous>  dextrose 5%. 1000 milliLiter(s) (100 mL/Hr) IV Continuous <Continuous>  dextrose 50% Injectable 25 Gram(s) IV Push once  dextrose 50% Injectable 12.5 Gram(s) IV Push once  dextrose 50% Injectable 25 Gram(s) IV Push once  donepezil 5 milliGRAM(s) Oral at bedtime  glucagon  Injectable 1 milliGRAM(s) IntraMuscular once  insulin glargine Injectable (LANTUS) 15 Unit(s) SubCutaneous before breakfast  insulin lispro (ADMELOG) corrective regimen sliding scale   SubCutaneous three times a day before meals  insulin lispro (ADMELOG) corrective regimen sliding scale   SubCutaneous at bedtime  insulin lispro Injectable (ADMELOG) 5 Unit(s) SubCutaneous three times a day before meals  lisinopril 40 milliGRAM(s) Oral daily    MEDICATIONS  (PRN):  acetaminophen     Tablet .. 650 milliGRAM(s) Oral every 6 hours PRN Temp greater or equal to 38C (100.4F), Mild Pain (1 - 3)  dextrose Oral Gel 15 Gram(s) Oral once PRN Blood Glucose LESS THAN 70 milliGRAM(s)/deciliter  dextrose Oral Gel 15 Gram(s) Oral once PRN Blood Glucose LESS THAN 70 milliGRAM(s)/deciliter  melatonin 3 milliGRAM(s) Oral at bedtime PRN Insomnia  ondansetron Injectable 4 milliGRAM(s) IV Push every 6 hours PRN Nausea and/or Vomiting      REVIEW OF SYSTEMS:  as per HPI  reporting continued nausea    RADIOLOGY & ADDITIONAL TESTS:    Imaging Personally Reviewed:  [x] YES  [ ] NO    Consultant(s) Notes Reviewed:  [x] YES  [ ] NO    PHYSICAL EXAM:  T(C): 36.9 (04-25-25 @ 20:17), Max: 37.2 (04-25-25 @ 12:44)  HR: 81 (04-25-25 @ 20:17) (81 - 100)  BP: 168/70 (04-25-25 @ 20:17) (158/80 - 170/87)  RR: 18 (04-25-25 @ 20:17) (18 - 18)  SpO2: 98% (04-25-25 @ 20:17) (98% - 99%)    GENERAL: NAD on room air  HEENT:  anicteric, moist mucous membranes  CHEST/LUNG:  CTA b/l, no rales, wheezes, or rhonchi  HEART:  RRR, S1, S2  ABDOMEN:  BS+, soft, nontender, nondistended  EXTREMITIES: LLE pain with palpation, dressed, pulses are palpable  NERVOUS SYSTEM: answers questions and follows commands  PSYCH: flat affect    Care Discussed with Consultants/Other Providers [x] YES  [ ] NO Patient is a 74y old  Female who presents with a chief complaint of Hyperglycemia (25 Apr 2025 10:58)      FROM ADMISSION H+P:   HPI:  74-year-old white female  presently residing in rehab center with history of diabetes mellitus on insulin hypertension and hyperlipidemia who had developed vascular ulcers/gangrene left lower extremity a and he was evaluated as outpatient by vascular surgeon scheduled for surgery today and because of glucose being out of control was sent to the emergency department here at [place] for further evaluation care treatment and management as well as stabilization of patient from a metabolic and glucose standpoint.  Patient states that on a good day her sugar runs 180-200.  No recent fever chills nausea vomiting or diarrhea.  ED COURSE:  Vitals: T 97.7  F , 90 HR  ,  /47 , RR 16 , SpO2  100% on RA  Labs significant for: wbc 15.92, ,   Imaging: No acute infilitrate on chest x ray   EKG: NSR, qtc 430  Pt received: 10u admelog, zosyn 3.375 g, vanc 1g, LR 1L   (21 Apr 2025 12:55)      INTERVAL HPI/OVERNIGHT EVENTS: Patient was seen and examined. Noted nonsustained episodes of PAT/SVT to 160s overnight. No atrial fibrillation seen since start of telemetry monitoring. All repeat EKGs have been SR. Pt still reporting nausea and feeling weak. On room air. Spoke to son Thanh outside room. He reports that he spoke to Dr. Burden and is agreement for deferring angiogram at this time. States that he agrees with vascular's thought process and reasoning. Curious about discharge planning outlook from here on out in regards to how rehab will address antibiotic treatment (on oral ciprofloxacin). Son states that he is a medical device rep and understands how medical field operates.    I&O's Summary    24 Apr 2025 07:01  -  25 Apr 2025 07:00  --------------------------------------------------------  IN: 0 mL / OUT: 1703 mL / NET: -1703 mL    25 Apr 2025 07:01  -  25 Apr 2025 21:19  --------------------------------------------------------  IN: 0 mL / OUT: 400 mL / NET: -400 mL        PAST MEDICAL & SURGICAL HISTORY:  DM (diabetes mellitus)      Multiple sclerosis      DM (diabetes mellitus)      HTN (hypertension)      No significant past surgical history          LABS:                        10.7   10.59 )-----------( 328      ( 25 Apr 2025 08:25 )             33.6     04-25    138  |  102  |  8   ----------------------------<  274[H]  3.3[L]   |  29  |  0.47[L]    Ca    9.0      25 Apr 2025 08:25  Phos  2.5     04-25  Mg     2.0     04-25    TPro  6.7  /  Alb  2.3[L]  /  TBili  0.4  /  DBili  x   /  AST  20  /  ALT  15  /  AlkPhos  126[H]  04-25      Urinalysis Basic - ( 25 Apr 2025 08:25 )    Color: x / Appearance: x / SG: x / pH: x  Gluc: 274 mg/dL / Ketone: x  / Bili: x / Urobili: x   Blood: x / Protein: x / Nitrite: x   Leuk Esterase: x / RBC: x / WBC x   Sq Epi: x / Non Sq Epi: x / Bacteria: x      CAPILLARY BLOOD GLUCOSE      POCT Blood Glucose.: 123 mg/dL (25 Apr 2025 21:10)  POCT Blood Glucose.: 117 mg/dL (25 Apr 2025 17:48)  POCT Blood Glucose.: 232 mg/dL (25 Apr 2025 12:24)  POCT Blood Glucose.: 268 mg/dL (25 Apr 2025 08:32)        Urinalysis Basic - ( 25 Apr 2025 08:25 )    Color: x / Appearance: x / SG: x / pH: x  Gluc: 274 mg/dL / Ketone: x  / Bili: x / Urobili: x   Blood: x / Protein: x / Nitrite: x   Leuk Esterase: x / RBC: x / WBC x   Sq Epi: x / Non Sq Epi: x / Bacteria: x        MEDICATIONS  (STANDING):  apixaban 5 milliGRAM(s) Oral every 12 hours  aspirin  chewable 81 milliGRAM(s) Oral daily  atorvastatin 20 milliGRAM(s) Oral at bedtime  ciprofloxacin     Tablet 500 milliGRAM(s) Oral every 12 hours  dextrose 5%. 1000 milliLiter(s) (50 mL/Hr) IV Continuous <Continuous>  dextrose 5%. 1000 milliLiter(s) (100 mL/Hr) IV Continuous <Continuous>  dextrose 50% Injectable 25 Gram(s) IV Push once  dextrose 50% Injectable 12.5 Gram(s) IV Push once  dextrose 50% Injectable 25 Gram(s) IV Push once  donepezil 5 milliGRAM(s) Oral at bedtime  glucagon  Injectable 1 milliGRAM(s) IntraMuscular once  insulin glargine Injectable (LANTUS) 15 Unit(s) SubCutaneous before breakfast  insulin lispro (ADMELOG) corrective regimen sliding scale   SubCutaneous three times a day before meals  insulin lispro (ADMELOG) corrective regimen sliding scale   SubCutaneous at bedtime  insulin lispro Injectable (ADMELOG) 5 Unit(s) SubCutaneous three times a day before meals  lisinopril 40 milliGRAM(s) Oral daily    MEDICATIONS  (PRN):  acetaminophen     Tablet .. 650 milliGRAM(s) Oral every 6 hours PRN Temp greater or equal to 38C (100.4F), Mild Pain (1 - 3)  dextrose Oral Gel 15 Gram(s) Oral once PRN Blood Glucose LESS THAN 70 milliGRAM(s)/deciliter  dextrose Oral Gel 15 Gram(s) Oral once PRN Blood Glucose LESS THAN 70 milliGRAM(s)/deciliter  melatonin 3 milliGRAM(s) Oral at bedtime PRN Insomnia  ondansetron Injectable 4 milliGRAM(s) IV Push every 6 hours PRN Nausea and/or Vomiting      REVIEW OF SYSTEMS:  as per HPI  reporting continued nausea    RADIOLOGY & ADDITIONAL TESTS:    Imaging Personally Reviewed:  [x] YES  [ ] NO    Consultant(s) Notes Reviewed:  [x] YES  [ ] NO    PHYSICAL EXAM:  T(C): 36.5 (25 Apr 2025 04:21), Max: 37.1 (24 Apr 2025 14:50)  T(F): 97.7 (25 Apr 2025 04:21), Max: 98.8 (24 Apr 2025 14:50)  HR: 100 (25 Apr 2025 04:21) (70 - 100)  BP: 158/80 (25 Apr 2025 09:00) (158/80 - 170/87)  RR: 18 (25 Apr 2025 04:21) (18 - 18)  SpO2: 98% (25 Apr 2025 04:21) (97% - 99%)  Patient On (Oxygen Delivery Method): room air    GENERAL: NAD on room air  HEENT:  anicteric, moist mucous membranes  CHEST/LUNG:  CTA b/l, no rales, wheezes, or rhonchi  HEART:  RRR, S1, S2  ABDOMEN:  BS+, soft, nontender, nondistended  EXTREMITIES: LLE pain with palpation, dressed, pulses are palpable  NERVOUS SYSTEM: answers questions and follows commands  PSYCH: flat affect    Care Discussed with Consultants/Other Providers [x] YES  [ ] NO

## 2025-04-25 NOTE — PROGRESS NOTE ADULT - ASSESSMENT
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 Found to have paroxysmal atrial fibrillation, now back in SR.    Infected LLE Ulcers  Cellulitis/SSTI  PAD  - wound cx with pseudomonas - sensitivities noted - resistant to zosyn - changed to ciprofloxacin - QTC noted - no plans for vascular intervention will d/w ID further re: antibiotic course length (potentially 2 weeks)  - sepsis POA (evolving) secondary to SSTI & COVID - now resolved  - BCx negative. UCx with growth pending ID  - per vascular- no acute intervention now -   - d/w son Thanh 4/25 - he spoke to Dr. Burden again and now agreeable to plan to hold off angiogram per vascular recs - to revisit as outpatient depending on clinical status - son states that vascular MD did not rule out doing this in the future so he does agree with the thought process - he states that he had a misperception of the time frame and acuity of vascular findings that was cleared up by Dr. Burden  - continue AC/aspirin + statin  - ID following Dr. Huitron/Blayne  - Vascular following Dr. Burden  - Disposition planning to [facility] - per son to return to previous facility - anticipate DC this weekend or by Monday 4/28    COVID infection  - no role for remdesivir/decadron  - supportive care  - ID following    Atrial fibrillation, now in SR  Paroxsymal Atrial Tachycardia  - Afib with RVR 4/23 AM likely reactive secondary to sepsis  - Reviewed initial EKG from 4/23 - d/w cardiology 4/25 - question if p wave present so possible this was PAT but cannot rule out atrial fibrillation  - unfortunately was placed on telemetry monitoring hours after initial EKG - has been SR since start of telemetry monitoring - regardless, has been having bursts of tachycardia & already on AC for history of embolic CVA, so will treat as such given current information & clinical status  - patient without documented history of afib although per outpatient review, had embolic CVA with suspected cardiogenic etiology  - suspect patient with paroxysmal atrial fibrillation with extreme stress i.e 4/23 with fever & sepsis  - continue home apixaban  - stopped amlodipine & now on toprol XL - increased to 50mg given HTN and episodes of PAT/SVT  - hypokalemia noted - will replete aggressively and monitor  - TTE reviewed - normal EF, mild valvular regurgitations, mild DD  - cardiology consult Dr. Lazna appreciated    DM1, uncontrolled  - A1C 7.8  - continue lantus 15u qAM  - continue premeal lispro 5u TID  - continue low dose insulin corrective scale  - monitor BG, hypoglycemia protocol  - endocrine follow up    HTN  - remains hypertensive but stable - if BP still high can resume home amlodipine, which is being held at present  - continue lisinopril 40mg  - increase toprol XL to 50mg daily  - hold lasix for now  - monitor HDs    History of embolic CVA  - continue apixaban + statin    Alzheimer Dementia  - continue donepezil    Prophylactic Measure  - VTE ppx: apixaban    GOC: DNR/DNI    Disposition: [facility], likely on Monday - repleting electrolytes & optimizing cardiac status - to formulate plan with ID re: antibiotic duration given no plans for intervention - will also have to clarify wound care at facility

## 2025-04-25 NOTE — PROGRESS NOTE ADULT - ASSESSMENT
74-year-old white female  presently residing in rehab center with history of diabetes mellitus on insulin hypertension and hyperlipidemia who had developed vascular ulcers/gangrene left lower extremity, admitted for infection, consulted for new onset Afib     - Presented with fever, Covid PNA. Called for new onset A fib   - Pt has been NSR since initiation of monitoring  - Tele w/  Sr 70-100s , nonsustained PAT 130s, 140s, 160s x 1   - Holter Monitor 2022: Sinus with PACs, no significant events   - Continue ELIQUIS     - EK bpm, Atrial fibrillation with rapid ventricular response, Marked ST abnormality, possible inferior subendocardial injury  - No evidence of any active ischemia   - Stress Testing 2022: Normal study, no EKG changes or chest pain, LVEF 65%   - Continue aspirin and statin     - Technically difficult ECHO showed normal LV & RV size and function EF 64%, Mild MR and TR, Grade 1 DD  - No evidence of any meaningful volume overload     - BP stable and controlled with -170s   - Continue Lisinopril 40     - Monitor and replete lytes, keep K>4, Mg>2.  - Will continue to follow.    Heather Clark, MS FNP, AGACNP  Nurse Practitioner- Cardiology   Please call on TEAMS   74-year-old white female  presently residing in rehab center with history of diabetes mellitus on insulin hypertension and hyperlipidemia who had developed vascular ulcers/gangrene left lower extremity, admitted for infection, consulted for new onset Afib     - Presented with fever, Covid PNA. Called for ? new onset A fib   - EKG on 4/23 showed Possible A fib vs PAT.   - Pt has been NSR since initiation of monitoring.   - Tele w/  Sr 70-100s , nonsustained PAT 130s, 140s, 160s x 1   - Holter Monitor 8/2022: Sinus with PACs, no significant events   - Continue Eliquis for Embolic CVA     - EKG: A fib vs  bpm   - No evidence of any active ischemia   - Stress Testing 8/2022: Normal study, no EKG changes or chest pain, LVEF 65%   - Continue aspirin and statin     - Technically difficult ECHO showed normal LV & RV size and function EF 64%, Mild MR and TR, Grade 1 DD  - No evidence of any meaningful volume overload     - BP stable and controlled with -170s   - Continue Lisinopril 40     - COVID management per primary   - Monitor and replete lytes, keep K>4, Mg>2.  - Will continue to follow.    Heather Clark, MS FNP, AGACNP  Nurse Practitioner- Cardiology   Please call on TEAMS   74-year-old white female  presently residing in rehab center with history of diabetes mellitus on insulin hypertension and hyperlipidemia who had developed vascular ulcers/gangrene left lower extremity, admitted for infection, consulted for new onset Afib     - Presented with fever, Covid PNA. Called for ? new onset A fib   - EKG on 4/23 showed Possible A fib vs PAT.   - Pt has been NSR since initiation of monitoring.   - Tele w/  Sr 70-100s , nonsustained PAT 130s, 140s, 160s x 1   - Holter Monitor 8/2022: Sinus with PACs, no significant events   - Continue Eliquis for Embolic CVA   - Continue BB    - EKG: A fib vs  bpm   - No evidence of any active ischemia   - Stress Testing 8/2022: Normal study, no EKG changes or chest pain, LVEF 65%   - Continue aspirin and statin     - Technically difficult ECHO showed normal LV & RV size and function EF 64%, Mild MR and TR, Grade 1 DD  - No evidence of any meaningful volume overload     - BP stable and controlled with -170s   - Continue Lisinopril 40     - COVID management per primary   - Monitor and replete lytes, keep K>4, Mg>2.  - Will continue to follow.    Heather Clakr, MS FNP, AGACNP  Nurse Practitioner- Cardiology   Please call on TEAMS

## 2025-04-25 NOTE — CASE MANAGEMENT PROGRESS NOTE - NSCMPROGRESSNOTE_GEN_ALL_CORE
Patient discussed in rounds. Discharge disposition is [facility].  SW involved. CM remains available throughout hospital stay.     Patient discussed in rounds. LLE necrotic area- vascular following. Poss Angio. Want 2nd opinion. Discharge disposition is [facility].  SW involved. CM remains available throughout hospital stay.

## 2025-04-25 NOTE — CHART NOTE - NSCHARTNOTEFT_GEN_A_CORE
Called by RN stating pt had an episode of SVT HR 160s, asymptomatic. Spoke to tele team pt had an episode of SVT  at 12:14 that lasted 6 seconds and converted back to NSR HR in 70s.   -mag and phos ordered for AM   -can consider EKG if additional episodes occur   -Will continue to monitor   -RN to notify w/ any changes

## 2025-04-26 LAB
ALBUMIN SERPL ELPH-MCNC: 2.3 G/DL — LOW (ref 3.3–5)
ALP SERPL-CCNC: 125 U/L — HIGH (ref 40–120)
ALT FLD-CCNC: 15 U/L — SIGNIFICANT CHANGE UP (ref 12–78)
ANION GAP SERPL CALC-SCNC: 6 MMOL/L — SIGNIFICANT CHANGE UP (ref 5–17)
AST SERPL-CCNC: 18 U/L — SIGNIFICANT CHANGE UP (ref 15–37)
BILIRUB SERPL-MCNC: 0.3 MG/DL — SIGNIFICANT CHANGE UP (ref 0.2–1.2)
BUN SERPL-MCNC: 12 MG/DL — SIGNIFICANT CHANGE UP (ref 7–23)
CALCIUM SERPL-MCNC: 8.8 MG/DL — SIGNIFICANT CHANGE UP (ref 8.5–10.1)
CHLORIDE SERPL-SCNC: 106 MMOL/L — SIGNIFICANT CHANGE UP (ref 96–108)
CO2 SERPL-SCNC: 30 MMOL/L — SIGNIFICANT CHANGE UP (ref 22–31)
CREAT SERPL-MCNC: 0.54 MG/DL — SIGNIFICANT CHANGE UP (ref 0.5–1.3)
CULTURE RESULTS: ABNORMAL
CULTURE RESULTS: SIGNIFICANT CHANGE UP
CULTURE RESULTS: SIGNIFICANT CHANGE UP
EGFR: 95 ML/MIN/1.73M2 — SIGNIFICANT CHANGE UP
EGFR: 95 ML/MIN/1.73M2 — SIGNIFICANT CHANGE UP
GLUCOSE SERPL-MCNC: 197 MG/DL — HIGH (ref 70–99)
HCT VFR BLD CALC: 32.6 % — LOW (ref 34.5–45)
HGB BLD-MCNC: 10.5 G/DL — LOW (ref 11.5–15.5)
MAGNESIUM SERPL-MCNC: 2 MG/DL — SIGNIFICANT CHANGE UP (ref 1.6–2.6)
MCHC RBC-ENTMCNC: 29.2 PG — SIGNIFICANT CHANGE UP (ref 27–34)
MCHC RBC-ENTMCNC: 32.2 G/DL — SIGNIFICANT CHANGE UP (ref 32–36)
MCV RBC AUTO: 90.8 FL — SIGNIFICANT CHANGE UP (ref 80–100)
NRBC BLD AUTO-RTO: 0 /100 WBCS — SIGNIFICANT CHANGE UP (ref 0–0)
ORGANISM # SPEC MICROSCOPIC CNT: ABNORMAL
ORGANISM # SPEC MICROSCOPIC CNT: SIGNIFICANT CHANGE UP
PHOSPHATE SERPL-MCNC: 2.6 MG/DL — SIGNIFICANT CHANGE UP (ref 2.5–4.5)
PLATELET # BLD AUTO: 359 K/UL — SIGNIFICANT CHANGE UP (ref 150–400)
POTASSIUM SERPL-MCNC: 3 MMOL/L — LOW (ref 3.5–5.3)
POTASSIUM SERPL-SCNC: 3 MMOL/L — LOW (ref 3.5–5.3)
PROT SERPL-MCNC: 6.8 G/DL — SIGNIFICANT CHANGE UP (ref 6–8.3)
RBC # BLD: 3.59 M/UL — LOW (ref 3.8–5.2)
RBC # FLD: 14.1 % — SIGNIFICANT CHANGE UP (ref 10.3–14.5)
SODIUM SERPL-SCNC: 142 MMOL/L — SIGNIFICANT CHANGE UP (ref 135–145)
SPECIMEN SOURCE: SIGNIFICANT CHANGE UP
WBC # BLD: 8.53 K/UL — SIGNIFICANT CHANGE UP (ref 3.8–10.5)
WBC # FLD AUTO: 8.53 K/UL — SIGNIFICANT CHANGE UP (ref 3.8–10.5)

## 2025-04-26 PROCEDURE — 99233 SBSQ HOSP IP/OBS HIGH 50: CPT

## 2025-04-26 PROCEDURE — 93010 ELECTROCARDIOGRAM REPORT: CPT

## 2025-04-26 PROCEDURE — 99232 SBSQ HOSP IP/OBS MODERATE 35: CPT

## 2025-04-26 RX ORDER — AMLODIPINE BESYLATE 10 MG/1
5 TABLET ORAL DAILY
Refills: 0 | Status: DISCONTINUED | OUTPATIENT
Start: 2025-04-27 | End: 2025-04-27

## 2025-04-26 RX ORDER — SODIUM CHLORIDE 9 G/1000ML
1000 INJECTION, SOLUTION INTRAVENOUS
Refills: 0 | Status: DISCONTINUED | OUTPATIENT
Start: 2025-04-26 | End: 2025-04-27

## 2025-04-26 RX ORDER — MIRTAZAPINE 30 MG/1
7.5 TABLET, FILM COATED ORAL
Refills: 0 | Status: DISCONTINUED | OUTPATIENT
Start: 2025-04-26 | End: 2025-05-01

## 2025-04-26 RX ORDER — METOCLOPRAMIDE HCL 10 MG
10 TABLET ORAL THREE TIMES A DAY
Refills: 0 | Status: COMPLETED | OUTPATIENT
Start: 2025-04-26 | End: 2025-04-26

## 2025-04-26 RX ORDER — SODIUM CHLORIDE 9 G/1000ML
1000 INJECTION, SOLUTION INTRAVENOUS
Refills: 0 | Status: DISCONTINUED | OUTPATIENT
Start: 2025-04-26 | End: 2025-04-26

## 2025-04-26 RX ADMIN — DONEPEZIL HYDROCHLORIDE 5 MILLIGRAM(S): 5 TABLET ORAL at 21:32

## 2025-04-26 RX ADMIN — Medication 10 MILLIGRAM(S): at 17:15

## 2025-04-26 RX ADMIN — SODIUM CHLORIDE 50 MILLILITER(S): 9 INJECTION, SOLUTION INTRAVENOUS at 13:07

## 2025-04-26 RX ADMIN — APIXABAN 5 MILLIGRAM(S): 2.5 TABLET, FILM COATED ORAL at 06:02

## 2025-04-26 RX ADMIN — ATORVASTATIN CALCIUM 20 MILLIGRAM(S): 80 TABLET, FILM COATED ORAL at 21:32

## 2025-04-26 RX ADMIN — BUTYROSPERMUM PARKII(SHEA BUTTER), SIMMONDSIA CHINENSIS (JOJOBA) SEED OIL, ALOE BARBADENSIS LEAF EXTRACT 250 MILLIGRAM(S): .01; 1; 3.5 LIQUID TOPICAL at 18:38

## 2025-04-26 RX ADMIN — SODIUM CHLORIDE 60 MILLILITER(S): 9 INJECTION, SOLUTION INTRAVENOUS at 22:40

## 2025-04-26 RX ADMIN — Medication 4 MILLIGRAM(S): at 13:06

## 2025-04-26 RX ADMIN — Medication 10 MILLIGRAM(S): at 21:32

## 2025-04-26 RX ADMIN — Medication 40 MILLIEQUIVALENT(S): at 17:15

## 2025-04-26 RX ADMIN — INSULIN GLARGINE-YFGN 15 UNIT(S): 100 INJECTION, SOLUTION SUBCUTANEOUS at 08:52

## 2025-04-26 RX ADMIN — INSULIN LISPRO 1: 100 INJECTION, SOLUTION INTRAVENOUS; SUBCUTANEOUS at 08:51

## 2025-04-26 RX ADMIN — Medication 81 MILLIGRAM(S): at 13:06

## 2025-04-26 RX ADMIN — BUTYROSPERMUM PARKII(SHEA BUTTER), SIMMONDSIA CHINENSIS (JOJOBA) SEED OIL, ALOE BARBADENSIS LEAF EXTRACT 250 MILLIGRAM(S): .01; 1; 3.5 LIQUID TOPICAL at 06:02

## 2025-04-26 RX ADMIN — Medication 40 MILLIEQUIVALENT(S): at 21:32

## 2025-04-26 RX ADMIN — Medication 500 MILLIGRAM(S): at 18:38

## 2025-04-26 RX ADMIN — MIRTAZAPINE 7.5 MILLIGRAM(S): 30 TABLET, FILM COATED ORAL at 21:31

## 2025-04-26 RX ADMIN — INSULIN LISPRO 5 UNIT(S): 100 INJECTION, SOLUTION INTRAVENOUS; SUBCUTANEOUS at 08:50

## 2025-04-26 RX ADMIN — LISINOPRIL 40 MILLIGRAM(S): 5 TABLET ORAL at 06:02

## 2025-04-26 RX ADMIN — Medication 500 MILLIGRAM(S): at 06:03

## 2025-04-26 RX ADMIN — Medication 40 MILLIEQUIVALENT(S): at 13:06

## 2025-04-26 RX ADMIN — Medication 4 MILLIGRAM(S): at 06:16

## 2025-04-26 RX ADMIN — METOPROLOL SUCCINATE 50 MILLIGRAM(S): 50 TABLET, EXTENDED RELEASE ORAL at 06:03

## 2025-04-26 RX ADMIN — APIXABAN 5 MILLIGRAM(S): 2.5 TABLET, FILM COATED ORAL at 18:38

## 2025-04-26 NOTE — PROGRESS NOTE ADULT - SUBJECTIVE AND OBJECTIVE BOX
[facility] -- Chikis Sanders Pannella, Patel, Savella, Goodger, Cohen: Office # [phone]    Follow Up: New onset A fib      Subjective/Observations: Patient seen and examined. Patient awake, alert, resting in bed. No complaints of chest pain, dyspnea, palpitations or dizziness. No signs of orthopnea or PND. Tolerating room air    REVIEW OF SYSTEMS: All other review of systems are negative unless indicated above    PAST MEDICAL & SURGICAL HISTORY:  DM (diabetes mellitus)      Multiple sclerosis      DM (diabetes mellitus)      HTN (hypertension)      No significant past surgical history    MEDICATIONS  (STANDING):  apixaban 5 milliGRAM(s) Oral every 12 hours  aspirin  chewable 81 milliGRAM(s) Oral daily  atorvastatin 20 milliGRAM(s) Oral at bedtime  ciprofloxacin     Tablet 500 milliGRAM(s) Oral every 12 hours  dextrose 5%. 1000 milliLiter(s) (50 mL/Hr) IV Continuous <Continuous>  dextrose 5%. 1000 milliLiter(s) (100 mL/Hr) IV Continuous <Continuous>  dextrose 50% Injectable 25 Gram(s) IV Push once  dextrose 50% Injectable 12.5 Gram(s) IV Push once  dextrose 50% Injectable 25 Gram(s) IV Push once  donepezil 5 milliGRAM(s) Oral at bedtime  glucagon  Injectable 1 milliGRAM(s) IntraMuscular once  insulin glargine Injectable (LANTUS) 15 Unit(s) SubCutaneous before breakfast  insulin lispro (ADMELOG) corrective regimen sliding scale   SubCutaneous three times a day before meals  insulin lispro (ADMELOG) corrective regimen sliding scale   SubCutaneous at bedtime  insulin lispro Injectable (ADMELOG) 5 Unit(s) SubCutaneous three times a day before meals  lisinopril 40 milliGRAM(s) Oral daily  metoprolol succinate ER 50 milliGRAM(s) Oral daily  saccharomyces boulardii 250 milliGRAM(s) Oral two times a day    MEDICATIONS  (PRN):  acetaminophen     Tablet .. 650 milliGRAM(s) Oral every 6 hours PRN Temp greater or equal to 38C (100.4F), Mild Pain (1 - 3)  dextrose Oral Gel 15 Gram(s) Oral once PRN Blood Glucose LESS THAN 70 milliGRAM(s)/deciliter  dextrose Oral Gel 15 Gram(s) Oral once PRN Blood Glucose LESS THAN 70 milliGRAM(s)/deciliter  melatonin 3 milliGRAM(s) Oral at bedtime PRN Insomnia  ondansetron Injectable 4 milliGRAM(s) IV Push every 6 hours PRN Nausea and/or Vomiting    Allergies    No Known Allergies    Intolerances      Vital Signs Last 24 Hrs  T(C): 36.3 (26 Apr 2025 05:20), Max: 37.2 (25 Apr 2025 12:44)  T(F): 97.3 (26 Apr 2025 05:20), Max: 98.9 (25 Apr 2025 12:44)  HR: 96 (26 Apr 2025 05:20) (81 - 96)  BP: 146/79 (26 Apr 2025 05:20) (146/79 - 168/70)  BP(mean): --  RR: 18 (26 Apr 2025 05:20) (18 - 18)  SpO2: 97% (26 Apr 2025 05:20) (97% - 99%)    Parameters below as of 26 Apr 2025 05:20  Patient On (Oxygen Delivery Method): room air      I&O's Summary    25 Apr 2025 07:01  -  26 Apr 2025 07:00  --------------------------------------------------------  IN: 0 mL / OUT: 550 mL / NET: -550 mL    TELE: Sr 80-100s, PVC, 4 beats multiform PVC    PHYSICAL EXAM:  Constitutional: NAD, awake and alert  HEENT: Moist Mucous Membranes, Anicteric  Pulmonary: Non-labored, breath sounds are clear bilaterally, Diminished at bases No wheezing, rales or rhonchi  Cardiovascular: Regular, S1 and S2, No murmurs, No rubs, gallops or clicks  Gastrointestinal:  soft, nontender, nondistended   Lymph: No peripheral edema. No lymphadenopathy.   Skin: No visible rashes, LLE DSD  Psych:  Mood & affect appropriate      LABS: All Labs Reviewed:                        10.5   8.53  )-----------( 359      ( 26 Apr 2025 09:19 )             32.6                         10.7   10.59 )-----------( 328      ( 25 Apr 2025 08:25 )             33.6                         9.6    12.61 )-----------( 331      ( 24 Apr 2025 07:48 )             29.6     26 Apr 2025 09:19    142    |  106    |  12     ----------------------------<  197    3.0     |  30     |  0.54   25 Apr 2025 08:25    138    |  102    |  8      ----------------------------<  274    3.3     |  29     |  0.47   24 Apr 2025 07:48    139    |  98     |  7      ----------------------------<  279    3.2     |  29     |  0.51     Ca    8.8        26 Apr 2025 09:19  Ca    9.0        25 Apr 2025 08:25  Ca    8.7        24 Apr 2025 07:48  Phos  2.6       26 Apr 2025 09:19  Phos  2.5       25 Apr 2025 08:25  Phos  2.2       24 Apr 2025 07:48  Mg     2.0       26 Apr 2025 09:19  Mg     2.0       25 Apr 2025 08:25  Mg     1.9       24 Apr 2025 07:48    TPro  6.8    /  Alb  2.3    /  TBili  0.3    /  DBili  x      /  AST  18     /  ALT  15     /  AlkPhos  125    26 Apr 2025 09:19  TPro  6.7    /  Alb  2.3    /  TBili  0.4    /  DBili  x      /  AST  20     /  ALT  15     /  AlkPhos  126    25 Apr 2025 08:25  TPro  6.5    /  Alb  2.2    /  TBili  0.4    /  DBili  x      /  AST  25     /  ALT  13     /  AlkPhos  122    24 Apr 2025 07:48   LIVER FUNCTIONS - ( 26 Apr 2025 09:19 )  Alb: 2.3 g/dL / Pro: 6.8 g/dL / ALK PHOS: 125 U/L / ALT: 15 U/L / AST: 18 U/L / GGT: x             12 Lead ECG:   Ventricular Rate 83 BPM    Atrial Rate 83 BPM    P-R Interval 122 ms    QRS Duration 84 ms    Q-T Interval 396 ms    QTC Calculation(Bazett) 465 ms    P Axis 83 degrees    R Axis 45 degrees    T Axis 50 degrees    Diagnosis Line Normal sinus rhythm  Nonspecific ST abnormality  Abnormal ECG  Confirmed by Sara Hamm ([id number]) on 4/24/2025 1:52:11 PM (04-24-25 @ 10:08)      TRANSTHORACIC ECHOCARDIOGRAM REPORT  ________________________________________________________________________________                                      _______       Pt. Name:       TERESA MCHUGH Study Date:    4/24/2025  MRN:            [id number]       YOB: 1950  Accession #:    [id number]       Age:           74 years  Account#:       [id number]      Gender:        F  Heart Rate:                     Height:        62.99 in (160.00 cm)  Rhythm:                         Weight:        125.66 lb (57.00 kg)  Blood Pressure: 163/69 mmHg     BSA/BMI:       1.59 m² / 22.27 kg/m²  ________________________________________________________________________________________  Referring Physician:    [id number] Navid Plummer  Interpreting Physician: Lopez Cano M.D.  Primary Sonographer:    Agustin Quesada    CPT:               ECHO TTE WO CON COMP W DOPP - 04446.m  Indication(s):     Unspecified atrial fibrillation - I48.91  Procedure:         Transthoracic echocardiogram with 2-D, M-mode and complete                     spectral and color flow Doppler.  Ordering Location: [facility]  Admission Status:  Inpatient  Study Information: Image quality for this study is technically difficult.    _______________________________________________________________________________________     CONCLUSIONS:      1. Technically difficult image quality.   2. Left ventricular cavity is normal in size. Left ventricular systolic function is normal with an ejection fraction of 64 % by Pond's method of disks.   3. Normal right ventricular cavity size and normal right ventricular systolic function.   4. Mild mitral regurgitation.   5. Mild tricuspid regurgitation.   6. There is focal calcification of the aortic valve leaflets.   7. Estimated pulmonary artery systolic pressure is 29 mmHg.   8. There is mild (grade 1) left ventricular diastolic dysfunction.    ________________________________________________________________________________________  FINDINGS:     Left Ventricle:  The left ventricular cavity is normal in size. Left ventricular systolic function is normal with a calculated ejection fraction of 64 % by the Pond's biplane method of disks. There is normal LV mass and concentric remodeling. There is mild (grade 1) left ventricular diastolic dysfunction.     Right Ventricle:  The right ventricular cavity is normal in size and right ventricular systolic function is normal. Tricuspid annular plane systolic excursion (TAPSE) is 2.0 cm (normal >=1.7 cm).     Left Atrium:  The left atrium is normal in size.     Right Atrium:  The right atrium is normal in size with an indexed volume of 13.61 ml/m² and an indexed area of 6.36 cm²/m².     Interatrial Septum:  The interatrial septum appears intact.     Aortic Valve:  The aortic valve appears trileaflet with normal systolic excursion. There is focal calcification of the aortic valve leaflets. There is fibrocalcific aortic valve sclerosis without stenosis. There is no evidence of aortic regurgitation.     Mitral Valve:  There is mitral valve thickening of the anterior and posterior leaflets. There is calcification of the mitral valve annulus. There is mild mitral regurgitation.     Tricuspid Valve:  Structurally normal tricuspid valve with normal leaflet excursion. Thereis mild tricuspid regurgitation. Estimated pulmonary artery systolic pressure is 29 mmHg.     Pulmonic Valve:  The pulmonic valve was not well visualized.     Aorta:  The aortic root at the sinuses of Valsalva is normal in size, measuring 2.90 cm (indexed 1.83 cm/m²). The ascending aorta is normal in size, measuring 2.80 cm (indexed 1.76 cm/m²). The aortic arch diameter is normal in size, measuring 2.6 cm (indexed 1.64 cm/m²).     Pericardium:  No pericardial effusion seen.     Systemic Veins:  The inferior vena cava is normal in size measuring 1.14 cm in diameter, (normal <2.1cm) with normal inspiratory collapse (normal >50%) consistent with normal right atrial pressure (~3, range 0-5mmHg).  ____________________________________________________________________  QUANTITATIVE DATA:  Left Ventricle Measurements: (Indexed to BSA)     IVSd (2D):   1.1 cm  LVPWd (2D):  1.0 cm  LVIDd (2D):  3.9 cm  LVIDs (2D):  2.6 cm  LV Mass:     139 g  87.8 g/m²  LV Vol d, MOD A2C: 47.2 ml 29.74 ml/m²  LV Vold, MOD A4C: 53.1 ml 33.46 ml/m²  LV Vol d, MOD BP:  53.1 ml 33.44 ml/m²  LV Vol s, MOD A2C: 17.8 ml 11.22 ml/m²  LV Vol s, MOD A4C: 19.7 ml 12.41 ml/m²  LV Vol s, MOD BP:  19.3 ml 12.18 ml/m²  LVOT SV MOD BP:    33.7 ml  LV EF% MOD BP:     64 %     MV E Vmax:    0.98 m/s  MV A Vmax:    1.35 m/s  MV E/A:       0.73  e' lateral:   8.81 cm/s  e' medial:    9.79 cm/s  E/e' lateral: 11.12  E/e' medial:  10.01  E/e' Average: 10.54  MV DT:        218 msec    Aorta Measurements: (Normal range) (Indexed to BSA)     Ao Root d     2.90 cm (2.7 - 3.3 cm) 1.83 cm/m²  Ao Asc d, 2D: 2.80  Ao Asc prox:  2.80 cm                1.76 cm/m²  Ao Arch:      2.6 cm            Left Atrium Measurements: (Indexed to BSA)  LA Diam 2D: 2.90 cm         Right Ventricle Measurements: Right Atrial Measurements:     TAPSE:           2.0 cm       RA Vol s, MOD A4C         21.6 ml  RV Base (RVID1): 3.6 cm       RA Vol s, MOD A4C i BSA   13.61 ml/m²  RV Mid (RVID2):  2.7 cm       RA Area s, MOD A4C        10.1 cm²                       RA Area s, MOD A4C, i BSA 6.36 cm²/m²       LVOT / RVOT/ Qp/Qs Data: (Indexed to BSA)  LVOT Diameter,s: 1.90 cm  LVOT Area:       2.84 cm²  LVOT Vmax:       1.16 m/s  LVOT Vmn:        0.749 m/s  LVOT VTI:        22.30 cm  LVOT peak grad:  5 mmHg  LVOT mean grad:  2.0 mmHg  LVOT SV:         63.2 ml   39.84 ml/m²    Aortic Valve Measurements:  AV Vmax:                1.2 m/s  AV Peak Gradient:       6.1 mmHg  AV Mean Gradient:       3.0 mmHg  AV VTI:                 24.4 cm  AVVTI Ratio:           0.91  AoV EOA, Contin:        2.59 cm²  AoV EOA, Contin i:      1.63 cm²/m²  AoV area, (CE):         2.59 cm²  AoV area, (CE), i:      1.63 cm²/m²  AoV Dimensionless Index 0.91    Mitral Valve Measurements:     MV E Vmax: 1.0 m/s        MR Vmax:          2.67 m/s  MV A Vmax: 1.4 m/s         MR Peak Gradient: 28.5 mmHg  MV E/A:    0.7       Tricuspid Valve Measurements:     TR Vmax:          2.6 m/s  TR Peak Gradient: 26.0 mmHg  RA Pressure:      3 mmHg  PASP:             29 mmHg    ________________________________________________________________________________________  Electronically signed on 4/24/2025 at 2:29:29 PM by Lopez Cano M.D.         *** Final ***

## 2025-04-26 NOTE — PROGRESS NOTE ADULT - ASSESSMENT
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 Found to have paroxysmal atrial fibrillation, now back in SR.    Infected LLE Ulcers  Cellulitis/SSTI  PAD  - wound cx with pseudomonas - sensitivities noted - resistant to zosyn - changed to ciprofloxacin - QTC noted - no plans for vascular intervention will d/w ID further re: antibiotic course length (potentially 2 weeks)  - sepsis POA (evolving) secondary to SSTI & COVID - now resolved  - BCx negative. UCx with growth pending ID  - per vascular- no acute intervention now  - continue AC/aspirin + statin  - ID following Dr. Huitron/Blayne  - Vascular following Dr. Burden  - Disposition planning to [facility] - per son to return to previous facility - anticipate DC this weekend or by Monday 4/28    Nausea  Failure to thrive/Malnutrition  Depression  - per  report - nausea is not new - has been dealing with this for a while  -  reports patient takes a pill before eating  - suspect pt with gastroparesis and now with worsening symptoms due to COVID  - will trial some standing reglan (EKG ordered prior)  - continue nutrition supplements  - will trial remeron    COVID infection  - no role for remdesivir/decadron  - supportive care  - ID following    Atrial fibrillation, now in SR  Paroxsymal Atrial Tachycardia  - Afib with RVR 4/23 AM likely reactive secondary to sepsis  - Reviewed initial EKG from 4/23 - d/w cardiology 4/25 - question if p wave present so possible this was PAT but cannot rule out atrial fibrillation  - unfortunately was placed on telemetry monitoring hours after initial EKG - has been SR since start of telemetry monitoring - regardless, has been having bursts of tachycardia & already on AC for history of embolic CVA, so will treat as such given current information & clinical status  - patient without documented history of afib although per outpatient review, had embolic CVA with suspected cardiogenic etiology  - suspect patient with paroxysmal atrial fibrillation with extreme stress i.e 4/23 with fever & sepsis  - continue home apixaban  - stopped amlodipine & now on toprol XL - increased to 50mg given HTN and episodes of PAT/SVT  - hypokalemia noted - will replete aggressively and monitor  - TTE reviewed - normal EF, mild valvular regurgitations, mild DD  - cardiology consult Dr. Lanza appreciated    DM1, uncontrolled  - A1C 7.8  - now with hypoglycemia due to decreased PO intake  - continue lantus 15u qAM  - DC premeal lispro 5u TID given low intake  - continue low dose insulin corrective scale  - monitor BG, hypoglycemia protocol  - endocrine follow up    Persistent Hypokalemia  - remains hypokalemic despite aggressive repletion and being off lasix  - continue KCl supplementation  - monitor and replete lytes    HTN  - remains hypertensive but stable  - will add back home norvasc at half dose starting 4/27  - continue lisinopril 40mg  - continue toprol XL to 50mg daily  - hold lasix for now  - monitor HDs    History of embolic CVA  - continue apixaban + statin    Alzheimer Dementia  - continue donepezil    Prophylactic Measure  - VTE ppx: apixaban    GOC: DNR/DNI    Disposition: [facility], likely on Monday - addressing nausea, repleting electrolytes & optimizing cardiac status - to formulate plan with ID re: antibiotic duration given no plans for intervention - will also have to clarify wound care at facility

## 2025-04-26 NOTE — PROGRESS NOTE ADULT - SUBJECTIVE AND OBJECTIVE BOX
CAPILLARY BLOOD GLUCOSE      POCT Blood Glucose.: 119 mg/dL (26 Apr 2025 17:09)  POCT Blood Glucose.: 172 mg/dL (26 Apr 2025 13:23)  POCT Blood Glucose.: 62 mg/dL (26 Apr 2025 12:42)  POCT Blood Glucose.: 61 mg/dL (26 Apr 2025 12:28)  POCT Blood Glucose.: 188 mg/dL (26 Apr 2025 08:23)  POCT Blood Glucose.: 123 mg/dL (25 Apr 2025 21:10)      Vital Signs Last 24 Hrs  T(C): 36.6 (26 Apr 2025 12:49), Max: 36.9 (25 Apr 2025 20:17)  T(F): 97.8 (26 Apr 2025 12:49), Max: 98.5 (25 Apr 2025 20:17)  HR: 86 (26 Apr 2025 12:49) (81 - 96)  BP: 134/81 (26 Apr 2025 12:49) (134/81 - 168/70)  BP(mean): --  RR: 18 (26 Apr 2025 12:49) (18 - 18)  SpO2: 99% (26 Apr 2025 12:49) (97% - 99%)    Parameters below as of 26 Apr 2025 12:49  Patient On (Oxygen Delivery Method): room air        General: WN/WD NAD  Respiratory: CTA B/L  CV: RRR, S1S2, no murmurs, rubs or gallops  Abdominal: Soft, NT, ND +BS, Last BM  Extremities: No edema, + peripheral pulses     04-26    142  |  106  |  12  ----------------------------<  197[H]  3.0[L]   |  30  |  0.54    Ca    8.8      26 Apr 2025 09:19  Phos  2.6     04-26  Mg     2.0     04-26    TPro  6.8  /  Alb  2.3[L]  /  TBili  0.3  /  DBili  x   /  AST  18  /  ALT  15  /  AlkPhos  125[H]  04-26      atorvastatin 20 milliGRAM(s) Oral at bedtime  dextrose 50% Injectable 25 Gram(s) IV Push once  dextrose 50% Injectable 12.5 Gram(s) IV Push once  dextrose 50% Injectable 25 Gram(s) IV Push once  dextrose Oral Gel 15 Gram(s) Oral once PRN  dextrose Oral Gel 15 Gram(s) Oral once PRN  glucagon  Injectable 1 milliGRAM(s) IntraMuscular once  insulin glargine Injectable (LANTUS) 15 Unit(s) SubCutaneous before breakfast  insulin lispro (ADMELOG) corrective regimen sliding scale   SubCutaneous three times a day before meals  insulin lispro (ADMELOG) corrective regimen sliding scale   SubCutaneous at bedtime

## 2025-04-26 NOTE — PROGRESS NOTE ADULT - SUBJECTIVE AND OBJECTIVE BOX
Patient is a 74y old  Female who presents with a chief complaint of Hyperglycemia (26 Apr 2025 19:48)      FROM ADMISSION H+P:   HPI:  74-year-old white female  presently residing in rehab center with history of diabetes mellitus on insulin hypertension and hyperlipidemia who had developed vascular ulcers/gangrene left lower extremity a and he was evaluated as outpatient by vascular surgeon scheduled for surgery today and because of glucose being out of control was sent to the emergency department here at [facility] for further evaluation care treatment and management as well as stabilization of patient from a metabolic and glucose standpoint.  Patient states that on a good day her sugar runs 180-200.  No recent fever chills nausea vomiting or diarrhea.  ED COURSE:  Vitals: T 97.7  F , 90 HR  ,  /47 , RR 16 , SpO2  100% on RA  Labs significant for: wbc 15.92, ,   Imaging: No acute infilitrate on chest x ray   EKG: NSR, qtc 430  Pt received: 10u admelog, zosyn 3.375 g, vanc 1g, LR 1L   (21 Apr 2025 12:55)      INTERVAL HPI/OVERNIGHT EVENTS: Patient was seen and examined. Hypoglycemic. Seen with  Trevon at bedside. Patient still complaining of nausea.  states that this is chronic and she takes zofran chronically at home - long before the COVID - now its just much worse. Pt feels tired and sleepy. Not as interactive today. Discussed trial of remeron with  - would like to try it while she is in the hospital.     I&O's Summary    25 Apr 2025 07:01  -  26 Apr 2025 07:00  --------------------------------------------------------  IN: 0 mL / OUT: 550 mL / NET: -550 mL    26 Apr 2025 07:01  -  26 Apr 2025 20:32  --------------------------------------------------------  IN: 0 mL / OUT: 50 mL / NET: -50 mL        PAST MEDICAL & SURGICAL HISTORY:  DM (diabetes mellitus)      Multiple sclerosis      DM (diabetes mellitus)      HTN (hypertension)      No significant past surgical history          LABS:                        10.5   8.53  )-----------( 359      ( 26 Apr 2025 09:19 )             32.6     04-26    142  |  106  |  12  ----------------------------<  197[H]  3.0[L]   |  30  |  0.54    Ca    8.8      26 Apr 2025 09:19  Phos  2.6     04-26  Mg     2.0     04-26    TPro  6.8  /  Alb  2.3[L]  /  TBili  0.3  /  DBili  x   /  AST  18  /  ALT  15  /  AlkPhos  125[H]  04-26      Urinalysis Basic - ( 26 Apr 2025 09:19 )    Color: x / Appearance: x / SG: x / pH: x  Gluc: 197 mg/dL / Ketone: x  / Bili: x / Urobili: x   Blood: x / Protein: x / Nitrite: x   Leuk Esterase: x / RBC: x / WBC x   Sq Epi: x / Non Sq Epi: x / Bacteria: x      CAPILLARY BLOOD GLUCOSE      POCT Blood Glucose.: 119 mg/dL (26 Apr 2025 17:09)  POCT Blood Glucose.: 172 mg/dL (26 Apr 2025 13:23)  POCT Blood Glucose.: 62 mg/dL (26 Apr 2025 12:42)  POCT Blood Glucose.: 61 mg/dL (26 Apr 2025 12:28)  POCT Blood Glucose.: 188 mg/dL (26 Apr 2025 08:23)  POCT Blood Glucose.: 123 mg/dL (25 Apr 2025 21:10)        Urinalysis Basic - ( 26 Apr 2025 09:19 )    Color: x / Appearance: x / SG: x / pH: x  Gluc: 197 mg/dL / Ketone: x  / Bili: x / Urobili: x   Blood: x / Protein: x / Nitrite: x   Leuk Esterase: x / RBC: x / WBC x   Sq Epi: x / Non Sq Epi: x / Bacteria: x        MEDICATIONS  (STANDING):  apixaban 5 milliGRAM(s) Oral every 12 hours  aspirin  chewable 81 milliGRAM(s) Oral daily  atorvastatin 20 milliGRAM(s) Oral at bedtime  ciprofloxacin     Tablet 500 milliGRAM(s) Oral every 12 hours  dextrose 5%. 1000 milliLiter(s) (50 mL/Hr) IV Continuous <Continuous>  dextrose 5%. 1000 milliLiter(s) (100 mL/Hr) IV Continuous <Continuous>  dextrose 50% Injectable 25 Gram(s) IV Push once  dextrose 50% Injectable 12.5 Gram(s) IV Push once  dextrose 50% Injectable 25 Gram(s) IV Push once  donepezil 5 milliGRAM(s) Oral at bedtime  glucagon  Injectable 1 milliGRAM(s) IntraMuscular once  insulin glargine Injectable (LANTUS) 15 Unit(s) SubCutaneous before breakfast  insulin lispro (ADMELOG) corrective regimen sliding scale   SubCutaneous three times a day before meals  insulin lispro (ADMELOG) corrective regimen sliding scale   SubCutaneous at bedtime  lactated ringers 1000 milliLiter(s) (60 mL/Hr) IV Continuous <Continuous>  lisinopril 40 milliGRAM(s) Oral daily  metoclopramide Injectable 10 milliGRAM(s) IV Push three times a day  metoprolol succinate ER 50 milliGRAM(s) Oral daily  mirtazapine 7.5 milliGRAM(s) Oral <User Schedule>  potassium chloride   Powder 40 milliEquivalent(s) Oral every 4 hours  saccharomyces boulardii 250 milliGRAM(s) Oral two times a day    MEDICATIONS  (PRN):  acetaminophen     Tablet .. 650 milliGRAM(s) Oral every 6 hours PRN Temp greater or equal to 38C (100.4F), Mild Pain (1 - 3)  dextrose Oral Gel 15 Gram(s) Oral once PRN Blood Glucose LESS THAN 70 milliGRAM(s)/deciliter  dextrose Oral Gel 15 Gram(s) Oral once PRN Blood Glucose LESS THAN 70 milliGRAM(s)/deciliter  melatonin 3 milliGRAM(s) Oral at bedtime PRN Insomnia  ondansetron Injectable 4 milliGRAM(s) IV Push every 6 hours PRN Nausea and/or Vomiting      REVIEW OF SYSTEMS:  unable to obtain due to mental status    RADIOLOGY & ADDITIONAL TESTS:    Imaging Personally Reviewed:  [x] YES  [ ] NO    Consultant(s) Notes Reviewed:  [x] YES  [ ] NO    PHYSICAL EXAM:  T(C): 36.6 (04-26-25 @ 12:49), Max: 36.6 (04-26-25 @ 12:49)  HR: 86 (04-26-25 @ 12:49) (86 - 96)  BP: 134/81 (04-26-25 @ 12:49) (134/81 - 146/79)  RR: 18 (04-26-25 @ 12:49) (18 - 18)  SpO2: 99% (04-26-25 @ 12:49) (97% - 99%)    GENERAL: ill appearing, tired  HEENT:  anicteric, moist mucous membranes  CHEST/LUNG:  CTA b/l anteriorly  HEART:  RRR, S1, S2  ABDOMEN:  BS+, soft, nontender, nondistended  EXTREMITIES: LLE dressed  NERVOUS SYSTEM: somnolent, answers questions and follows commands appropriately  PSYCH: flat affect    Care Discussed with Consultants/Other Providers [x] YES  [ ] NO

## 2025-04-26 NOTE — PROGRESS NOTE ADULT - SUBJECTIVE AND OBJECTIVE BOX
Persistent hypokalemia. Chart reviewed. Does not appear to be renal potassium wasting. Agree with KCl. Consult to follow

## 2025-04-26 NOTE — PROGRESS NOTE ADULT - ASSESSMENT
74-year-old white female  presently residing in rehab center with history of diabetes mellitus on insulin hypertension and hyperlipidemia who had developed vascular ulcers/gangrene left lower extremity, admitted for infection, consulted for new onset Afib     - Presented with fever, Covid PNA. Called for ? new onset A fib   - EKG on 4/23 showed Possible A fib vs PAT.   - Pt has been NSR since initiation of monitoring.   - Tele w/  SR 80-100s, PVC. Had nonsustained PAT episodes   - Holter Monitor 8/2022: Sinus with PACs, no significant events   - Continue Eliquis for Embolic CVA   - Continue BB    - EKG: A fib vs  bpm   - No evidence of any active ischemia   - Stress Testing 8/2022: Normal study, no EKG changes or chest pain, LVEF 65%   - Continue aspirin and statin     - Technically difficult ECHO showed normal LV & RV size and function EF 64%, Mild MR and TR, Grade 1 DD  - No evidence of any meaningful volume overload     - BP stable and controlled with -160s   - Continue Lisinopril 40     - COVID management per primary   - Monitor and replete lytes, keep K>4, Mg>2.  - Will continue to follow.    Heather Clark, MS FNP, AGACNP  Nurse Practitioner- Cardiology   Please call on TEAMS

## 2025-04-27 LAB
ANION GAP SERPL CALC-SCNC: 4 MMOL/L — LOW (ref 5–17)
BUN SERPL-MCNC: 12 MG/DL — SIGNIFICANT CHANGE UP (ref 7–23)
CALCIUM SERPL-MCNC: 9 MG/DL — SIGNIFICANT CHANGE UP (ref 8.5–10.1)
CHLORIDE SERPL-SCNC: 112 MMOL/L — HIGH (ref 96–108)
CO2 SERPL-SCNC: 27 MMOL/L — SIGNIFICANT CHANGE UP (ref 22–31)
CREAT SERPL-MCNC: 0.57 MG/DL — SIGNIFICANT CHANGE UP (ref 0.5–1.3)
EGFR: 94 ML/MIN/1.73M2 — SIGNIFICANT CHANGE UP
EGFR: 94 ML/MIN/1.73M2 — SIGNIFICANT CHANGE UP
GLUCOSE SERPL-MCNC: 218 MG/DL — HIGH (ref 70–99)
HCT VFR BLD CALC: 33.8 % — LOW (ref 34.5–45)
HGB BLD-MCNC: 10.5 G/DL — LOW (ref 11.5–15.5)
MAGNESIUM SERPL-MCNC: 2 MG/DL — SIGNIFICANT CHANGE UP (ref 1.6–2.6)
MCHC RBC-ENTMCNC: 29.1 PG — SIGNIFICANT CHANGE UP (ref 27–34)
MCHC RBC-ENTMCNC: 31.1 G/DL — LOW (ref 32–36)
MCV RBC AUTO: 93.6 FL — SIGNIFICANT CHANGE UP (ref 80–100)
NRBC BLD AUTO-RTO: 0 /100 WBCS — SIGNIFICANT CHANGE UP (ref 0–0)
PHOSPHATE SERPL-MCNC: 2.9 MG/DL — SIGNIFICANT CHANGE UP (ref 2.5–4.5)
PLATELET # BLD AUTO: 320 K/UL — SIGNIFICANT CHANGE UP (ref 150–400)
POTASSIUM SERPL-MCNC: 5.1 MMOL/L — SIGNIFICANT CHANGE UP (ref 3.5–5.3)
POTASSIUM SERPL-SCNC: 5.1 MMOL/L — SIGNIFICANT CHANGE UP (ref 3.5–5.3)
RBC # BLD: 3.61 M/UL — LOW (ref 3.8–5.2)
RBC # FLD: 14.6 % — HIGH (ref 10.3–14.5)
SODIUM SERPL-SCNC: 143 MMOL/L — SIGNIFICANT CHANGE UP (ref 135–145)
WBC # BLD: 9.98 K/UL — SIGNIFICANT CHANGE UP (ref 3.8–10.5)
WBC # FLD AUTO: 9.98 K/UL — SIGNIFICANT CHANGE UP (ref 3.8–10.5)

## 2025-04-27 PROCEDURE — 99232 SBSQ HOSP IP/OBS MODERATE 35: CPT

## 2025-04-27 PROCEDURE — 99233 SBSQ HOSP IP/OBS HIGH 50: CPT

## 2025-04-27 RX ORDER — AMLODIPINE BESYLATE 10 MG/1
10 TABLET ORAL DAILY
Refills: 0 | Status: DISCONTINUED | OUTPATIENT
Start: 2025-04-28 | End: 2025-05-01

## 2025-04-27 RX ORDER — DEXTROSE 50 % IN WATER 50 %
25 SYRINGE (ML) INTRAVENOUS ONCE
Refills: 0 | Status: COMPLETED | OUTPATIENT
Start: 2025-04-27 | End: 2025-04-27

## 2025-04-27 RX ORDER — METOCLOPRAMIDE HCL 10 MG
5 TABLET ORAL THREE TIMES A DAY
Refills: 0 | Status: DISCONTINUED | OUTPATIENT
Start: 2025-04-27 | End: 2025-04-30

## 2025-04-27 RX ORDER — DEXTROSE 50 % IN WATER 50 %
12.5 SYRINGE (ML) INTRAVENOUS ONCE
Refills: 0 | Status: COMPLETED | OUTPATIENT
Start: 2025-04-27 | End: 2025-04-27

## 2025-04-27 RX ORDER — SODIUM CHLORIDE 9 G/1000ML
1000 INJECTION, SOLUTION INTRAVENOUS
Refills: 0 | Status: DISCONTINUED | OUTPATIENT
Start: 2025-04-27 | End: 2025-04-28

## 2025-04-27 RX ORDER — DEXTROSE 50 % IN WATER 50 %
15 SYRINGE (ML) INTRAVENOUS ONCE
Refills: 0 | Status: DISCONTINUED | OUTPATIENT
Start: 2025-04-27 | End: 2025-04-30

## 2025-04-27 RX ORDER — INSULIN GLARGINE-YFGN 100 [IU]/ML
10 INJECTION, SOLUTION SUBCUTANEOUS
Refills: 0 | Status: DISCONTINUED | OUTPATIENT
Start: 2025-04-28 | End: 2025-04-28

## 2025-04-27 RX ADMIN — Medication 25 GRAM(S): at 23:51

## 2025-04-27 RX ADMIN — BUTYROSPERMUM PARKII(SHEA BUTTER), SIMMONDSIA CHINENSIS (JOJOBA) SEED OIL, ALOE BARBADENSIS LEAF EXTRACT 250 MILLIGRAM(S): .01; 1; 3.5 LIQUID TOPICAL at 06:52

## 2025-04-27 RX ADMIN — Medication 500 MILLIGRAM(S): at 17:42

## 2025-04-27 RX ADMIN — Medication 5 MILLIGRAM(S): at 15:36

## 2025-04-27 RX ADMIN — Medication 15 GRAM(S): at 17:31

## 2025-04-27 RX ADMIN — LISINOPRIL 40 MILLIGRAM(S): 5 TABLET ORAL at 06:52

## 2025-04-27 RX ADMIN — BUTYROSPERMUM PARKII(SHEA BUTTER), SIMMONDSIA CHINENSIS (JOJOBA) SEED OIL, ALOE BARBADENSIS LEAF EXTRACT 250 MILLIGRAM(S): .01; 1; 3.5 LIQUID TOPICAL at 18:22

## 2025-04-27 RX ADMIN — AMLODIPINE BESYLATE 5 MILLIGRAM(S): 10 TABLET ORAL at 06:52

## 2025-04-27 RX ADMIN — MIRTAZAPINE 7.5 MILLIGRAM(S): 30 TABLET, FILM COATED ORAL at 18:40

## 2025-04-27 RX ADMIN — APIXABAN 5 MILLIGRAM(S): 2.5 TABLET, FILM COATED ORAL at 06:52

## 2025-04-27 RX ADMIN — SODIUM CHLORIDE 50 MILLILITER(S): 9 INJECTION, SOLUTION INTRAVENOUS at 21:20

## 2025-04-27 RX ADMIN — Medication 500 MILLIGRAM(S): at 06:52

## 2025-04-27 RX ADMIN — APIXABAN 5 MILLIGRAM(S): 2.5 TABLET, FILM COATED ORAL at 17:43

## 2025-04-27 RX ADMIN — Medication 12.5 GRAM(S): at 17:45

## 2025-04-27 RX ADMIN — DONEPEZIL HYDROCHLORIDE 5 MILLIGRAM(S): 5 TABLET ORAL at 21:20

## 2025-04-27 RX ADMIN — INSULIN LISPRO 2: 100 INJECTION, SOLUTION INTRAVENOUS; SUBCUTANEOUS at 08:55

## 2025-04-27 RX ADMIN — Medication 12.5 GRAM(S): at 21:20

## 2025-04-27 RX ADMIN — ATORVASTATIN CALCIUM 20 MILLIGRAM(S): 80 TABLET, FILM COATED ORAL at 21:21

## 2025-04-27 RX ADMIN — Medication 81 MILLIGRAM(S): at 12:24

## 2025-04-27 RX ADMIN — INSULIN GLARGINE-YFGN 15 UNIT(S): 100 INJECTION, SOLUTION SUBCUTANEOUS at 08:56

## 2025-04-27 RX ADMIN — Medication 40 MILLIGRAM(S): at 18:41

## 2025-04-27 RX ADMIN — Medication 5 MILLIGRAM(S): at 21:21

## 2025-04-27 RX ADMIN — METOPROLOL SUCCINATE 50 MILLIGRAM(S): 50 TABLET, EXTENDED RELEASE ORAL at 06:52

## 2025-04-27 NOTE — PROGRESS NOTE ADULT - SUBJECTIVE AND OBJECTIVE BOX
[facility] -- Chikis Sanders Pannella, Patel, Savella, Goodger, Cohen: Office # [phone]    Follow Up: New onset A fib      Subjective/Observations: Patient seen and examined. Patient awake, alert, resting in bed. No complaints of chest pain, dyspnea, palpitations or dizziness. No signs of orthopnea or PND. Tolerating room air. IVF @ 60     REVIEW OF SYSTEMS: All other review of systems are negative unless indicated above    PAST MEDICAL & SURGICAL HISTORY:  DM (diabetes mellitus)      Multiple sclerosis      DM (diabetes mellitus)      HTN (hypertension)      No significant past surgical history    MEDICATIONS  (STANDING):  amLODIPine   Tablet 5 milliGRAM(s) Oral daily  apixaban 5 milliGRAM(s) Oral every 12 hours  aspirin  chewable 81 milliGRAM(s) Oral daily  atorvastatin 20 milliGRAM(s) Oral at bedtime  ciprofloxacin     Tablet 500 milliGRAM(s) Oral every 12 hours  dextrose 5%. 1000 milliLiter(s) (50 mL/Hr) IV Continuous <Continuous>  dextrose 5%. 1000 milliLiter(s) (100 mL/Hr) IV Continuous <Continuous>  dextrose 50% Injectable 25 Gram(s) IV Push once  dextrose 50% Injectable 12.5 Gram(s) IV Push once  dextrose 50% Injectable 25 Gram(s) IV Push once  donepezil 5 milliGRAM(s) Oral at bedtime  glucagon  Injectable 1 milliGRAM(s) IntraMuscular once  insulin glargine Injectable (LANTUS) 15 Unit(s) SubCutaneous before breakfast  insulin lispro (ADMELOG) corrective regimen sliding scale   SubCutaneous three times a day before meals  insulin lispro (ADMELOG) corrective regimen sliding scale   SubCutaneous at bedtime  lactated ringers 1000 milliLiter(s) (60 mL/Hr) IV Continuous <Continuous>  lisinopril 40 milliGRAM(s) Oral daily  metoprolol succinate ER 50 milliGRAM(s) Oral daily  mirtazapine 7.5 milliGRAM(s) Oral <User Schedule>  saccharomyces boulardii 250 milliGRAM(s) Oral two times a day    MEDICATIONS  (PRN):  acetaminophen     Tablet .. 650 milliGRAM(s) Oral every 6 hours PRN Temp greater or equal to 38C (100.4F), Mild Pain (1 - 3)  dextrose Oral Gel 15 Gram(s) Oral once PRN Blood Glucose LESS THAN 70 milliGRAM(s)/deciliter  dextrose Oral Gel 15 Gram(s) Oral once PRN Blood Glucose LESS THAN 70 milliGRAM(s)/deciliter  melatonin 3 milliGRAM(s) Oral at bedtime PRN Insomnia  ondansetron Injectable 4 milliGRAM(s) IV Push every 6 hours PRN Nausea and/or Vomiting    Allergies    No Known Allergies    Intolerances      Vital Signs Last 24 Hrs  T(C): 36.9 (27 Apr 2025 06:45), Max: 36.9 (27 Apr 2025 06:45)  T(F): 98.5 (27 Apr 2025 06:45), Max: 98.5 (27 Apr 2025 06:45)  HR: 99 (27 Apr 2025 06:45) (86 - 99)  BP: 148/77 (27 Apr 2025 06:45) (134/81 - 154/75)  BP(mean): --  RR: 18 (27 Apr 2025 06:45) (18 - 18)  SpO2: 100% (27 Apr 2025 06:45) (98% - 100%)    Parameters below as of 27 Apr 2025 06:45  Patient On (Oxygen Delivery Method): room air      I&O's Summary    26 Apr 2025 07:01  -  27 Apr 2025 07:00  --------------------------------------------------------  IN: 0 mL / OUT: 550 mL / NET: -550 mL          TELE: Sr 70-90s, nonsustained 100s   PHYSICAL EXAM:  Constitutional: NAD, awake and alert  HEENT: Moist Mucous Membranes, Anicteric  Pulmonary: Non-labored, breath sounds are clear bilaterally, Diminished at bases No wheezing, rales or rhonchi  Cardiovascular: Regular, S1 and S2, No murmurs, No rubs, gallops or clicks  Gastrointestinal:  soft, nontender, nondistended   Lymph: No peripheral edema. No lymphadenopathy.   Skin: No visible rashes, LLE DSD  Psych:  Mood & affect appropriate        LABS: All Labs Reviewed:                        10.5   9.98  )-----------( 320      ( 27 Apr 2025 08:50 )             33.8                         10.5   8.53  )-----------( 359      ( 26 Apr 2025 09:19 )             32.6                         10.7   10.59 )-----------( 328      ( 25 Apr 2025 08:25 )             33.6     26 Apr 2025 09:19    142    |  106    |  12     ----------------------------<  197    3.0     |  30     |  0.54   25 Apr 2025 08:25    138    |  102    |  8      ----------------------------<  274    3.3     |  29     |  0.47     Ca    8.8        26 Apr 2025 09:19  Ca    9.0        25 Apr 2025 08:25  Phos  2.6       26 Apr 2025 09:19  Phos  2.5       25 Apr 2025 08:25  Mg     2.0       26 Apr 2025 09:19  Mg     2.0       25 Apr 2025 08:25    TPro  6.8    /  Alb  2.3    /  TBili  0.3    /  DBili  x      /  AST  18     /  ALT  15     /  AlkPhos  125    26 Apr 2025 09:19  TPro  6.7    /  Alb  2.3    /  TBili  0.4    /  DBili  x      /  AST  20     /  ALT  15     /  AlkPhos  126    25 Apr 2025 08:25   LIVER FUNCTIONS - ( 26 Apr 2025 09:19 )  Alb: 2.3 g/dL / Pro: 6.8 g/dL / ALK PHOS: 125 U/L / ALT: 15 U/L / AST: 18 U/L / GGT: x             12 Lead ECG:   Ventricular Rate 83 BPM    Atrial Rate 83 BPM    P-R Interval 114 ms    QRS Duration 74 ms    Q-T Interval 364 ms    QTC Calculation(Bazett) 427 ms    P Axis 96 degrees    R Axis 40 degrees    T Axis 83 degrees    Diagnosis Line Sinus rhythm with occasional premature ventricular complexes  Minimal voltage criteria for LVH, may be normal variant ( Sokolow-Riley )  Nonspecific ST and T wave abnormality  Abnormal ECG  When compared with ECG of 24-APR-2025 10:08,  premature ventricular complexes arenow present  Confirmed by Keren Watson ([id number]) on 4/27/2025 6:44:47 AM (04-26-25 @ 13:14)      TRANSTHORACIC ECHOCARDIOGRAM REPORT  ________________________________________________________________________________                                      _______       Pt. Name:       TERESA MCHUGH Study Date:    4/24/2025  MRN:            [id number]       YOB: 1950  Accession #:    [id number]       Age:           74 years  Account#:       [id number]      Gender:        F  Heart Rate:                     Height:        62.99 in (160.00 cm)  Rhythm:                         Weight:        125.66 lb (57.00 kg)  Blood Pressure: 163/69 mmHg     BSA/BMI:       1.59 m² / 22.27 kg/m²  ________________________________________________________________________________________  Referring Physician:    [id number] Navid Plummer  Interpreting Physician: Lopez Cano M.D.  Primary Sonographer:    Agustin Quesada    CPT:               ECHO TTE WO CON COMP W DOPP - 26338.m  Indication(s):     Unspecified atrial fibrillation - I48.91  Procedure:         Transthoracic echocardiogram with 2-D, M-mode and complete                     spectral and color flow Doppler.  Ordering Location: [facility]  Admission Status:  Inpatient  Study Information: Image quality for this study is technically difficult.    _______________________________________________________________________________________     CONCLUSIONS:      1. Technically difficult image quality.   2. Left ventricular cavity is normal in size. Left ventricular systolic function is normal with an ejection fraction of 64 % by Pond's method of disks.   3. Normal right ventricular cavity size and normal right ventricular systolic function.   4. Mild mitral regurgitation.   5. Mild tricuspid regurgitation.   6. There is focal calcification of the aortic valve leaflets.   7. Estimated pulmonary artery systolic pressure is 29 mmHg.   8. There is mild (grade 1) left ventricular diastolic dysfunction.    ________________________________________________________________________________________  FINDINGS:     Left Ventricle:  The left ventricular cavity is normal in size. Left ventricular systolic function is normal with a calculated ejection fraction of 64 % by the Pond's biplane method of disks. There is normal LV mass and concentric remodeling. There is mild (grade 1) left ventricular diastolic dysfunction.     Right Ventricle:  The right ventricular cavity is normal in size and right ventricular systolic function is normal. Tricuspid annular plane systolic excursion (TAPSE) is 2.0 cm (normal >=1.7 cm).     Left Atrium:  The left atrium is normal in size.     Right Atrium:  The right atrium is normal in size with an indexed volume of 13.61 ml/m² and an indexed area of 6.36 cm²/m².     Interatrial Septum:  The interatrial septum appears intact.     Aortic Valve:  The aortic valve appears trileaflet with normal systolic excursion. There is focal calcification of the aortic valve leaflets. There is fibrocalcific aortic valve sclerosis without stenosis. There is no evidence of aortic regurgitation.     Mitral Valve:  There is mitral valve thickening of the anterior and posterior leaflets. There is calcification of the mitral valve annulus. There is mild mitral regurgitation.     Tricuspid Valve:  Structurally normal tricuspid valve with normal leaflet excursion. Thereis mild tricuspid regurgitation. Estimated pulmonary artery systolic pressure is 29 mmHg.     Pulmonic Valve:  The pulmonic valve was not well visualized.     Aorta:  The aortic root at the sinuses of Valsalva is normal in size, measuring 2.90 cm (indexed 1.83 cm/m²). The ascending aorta is normal in size, measuring 2.80 cm (indexed 1.76 cm/m²). The aortic arch diameter is normal in size, measuring 2.6 cm (indexed 1.64 cm/m²).     Pericardium:  No pericardial effusion seen.     Systemic Veins:  The inferior vena cava is normal in size measuring 1.14 cm in diameter, (normal <2.1cm) with normal inspiratory collapse (normal >50%) consistent with normal right atrial pressure (~3, range 0-5mmHg).  ____________________________________________________________________  QUANTITATIVE DATA:  Left Ventricle Measurements: (Indexed to BSA)     IVSd (2D):   1.1 cm  LVPWd (2D):  1.0 cm  LVIDd (2D):  3.9 cm  LVIDs (2D):  2.6 cm  LV Mass:     139 g  87.8 g/m²  LV Vol d, MOD A2C: 47.2 ml 29.74 ml/m²  LV Vold, MOD A4C: 53.1 ml 33.46 ml/m²  LV Vol d, MOD BP:  53.1 ml 33.44 ml/m²  LV Vol s, MOD A2C: 17.8 ml 11.22 ml/m²  LV Vol s, MOD A4C: 19.7 ml 12.41 ml/m²  LV Vol s, MOD BP:  19.3 ml 12.18 ml/m²  LVOT SV MOD BP:    33.7 ml  LV EF% MOD BP:     64 %     MV E Vmax:    0.98 m/s  MV A Vmax:    1.35 m/s  MV E/A:       0.73  e' lateral:   8.81 cm/s  e' medial:    9.79 cm/s  E/e' lateral: 11.12  E/e' medial:  10.01  E/e' Average: 10.54  MV DT:        218 msec    Aorta Measurements: (Normal range) (Indexed to BSA)     Ao Root d     2.90 cm (2.7 - 3.3 cm) 1.83 cm/m²  Ao Asc d, 2D: 2.80  Ao Asc prox:  2.80 cm                1.76 cm/m²  Ao Arch:      2.6 cm            Left Atrium Measurements: (Indexed to BSA)  LA Diam 2D: 2.90 cm         Right Ventricle Measurements: Right Atrial Measurements:     TAPSE:           2.0 cm       RA Vol s, MOD A4C         21.6 ml  RV Base (RVID1): 3.6 cm       RA Vol s, MOD A4C i BSA   13.61 ml/m²  RV Mid (RVID2):  2.7 cm       RA Area s, MOD A4C        10.1 cm²                       RA Area s, MOD A4C, i BSA 6.36 cm²/m²       LVOT / RVOT/ Qp/Qs Data: (Indexed to BSA)  LVOT Diameter,s: 1.90 cm  LVOT Area:       2.84 cm²  LVOT Vmax:       1.16 m/s  LVOT Vmn:        0.749 m/s  LVOT VTI:        22.30 cm  LVOT peak grad:  5 mmHg  LVOT mean grad:  2.0 mmHg  LVOT SV:         63.2 ml   39.84 ml/m²    Aortic Valve Measurements:  AV Vmax:                1.2 m/s  AV Peak Gradient:       6.1 mmHg  AV Mean Gradient:       3.0 mmHg  AV VTI:                 24.4 cm  AVVTI Ratio:           0.91  AoV EOA, Contin:        2.59 cm²  AoV EOA, Contin i:      1.63 cm²/m²  AoV area, (CE):         2.59 cm²  AoV area, (CE), i:      1.63 cm²/m²  AoV Dimensionless Index 0.91    Mitral Valve Measurements:     MV E Vmax: 1.0 m/s        MR Vmax:          2.67 m/s  MV A Vmax: 1.4 m/s         MR Peak Gradient: 28.5 mmHg  MV E/A:    0.7       Tricuspid Valve Measurements:     TR Vmax:          2.6 m/s  TR Peak Gradient: 26.0 mmHg  RA Pressure:      3 mmHg  PASP:             29 mmHg    ________________________________________________________________________________________  Electronically signed on 4/24/2025 at 2:29:29 PM by Lopez Cano M.D.         *** Final ***

## 2025-04-27 NOTE — PROGRESS NOTE ADULT - ASSESSMENT
74-year-old white female  presently residing in rehab center with history of diabetes mellitus on insulin hypertension and hyperlipidemia who had developed vascular ulcers/gangrene left lower extremity, admitted for infection, consulted for new onset Afib     - Presented with fever, Covid PNA. Called for ? new onset A fib   - EKG on 4/23 showed Possible A fib vs PAT.   - Pt has been NSR since initiation of monitoring.   - Tele w/  SR 70-100s, PVC. Had nonsustained PAT episodes   - Holter Monitor 8/2022: Sinus with PACs, no significant events   - Continue Eliquis for Embolic CVA   - Continue BB    - EKG: A fib vs  bpm   - No evidence of any active ischemia   - Stress Testing 8/2022: Normal study, no EKG changes or chest pain, LVEF 65%   - Continue aspirin and statin     - Technically difficult ECHO showed normal LV & RV size and function EF 64%, Mild MR and TR, Grade 1 DD  - No evidence of any meaningful volume overload     - BP stable and controlled with -150s   - Continue Lisinopril 40     - COVID management per primary   - Monitor and replete lytes, keep K>4, Mg>2.  - Will continue to follow.    Heather Clark, MS FNP, AGACNP  Nurse Practitioner- Cardiology   Please call on TEAMS

## 2025-04-27 NOTE — PROGRESS NOTE ADULT - SUBJECTIVE AND OBJECTIVE BOX
Patient is a 74y old  Female who presents with a chief complaint of Hyperglycemia (27 Apr 2025 17:23)      FROM ADMISSION H+P:   HPI:  74-year-old white female  presently residing in rehab center with history of diabetes mellitus on insulin hypertension and hyperlipidemia who had developed vascular ulcers/gangrene left lower extremity a and he was evaluated as outpatient by vascular surgeon scheduled for surgery today and because of glucose being out of control was sent to the emergency department here at [place] for further evaluation care treatment and management as well as stabilization of patient from a metabolic and glucose standpoint.  Patient states that on a good day her sugar runs 180-200.  No recent fever chills nausea vomiting or diarrhea.  ED COURSE:  Vitals: T 97.7  F , 90 HR  ,  /47 , RR 16 , SpO2  100% on RA  Labs significant for: wbc 15.92, ,   Imaging: No acute infilitrate on chest x ray   EKG: NSR, qtc 430  Pt received: 10u admelog, zosyn 3.375 g, vanc 1g, LR 1L   (21 Apr 2025 12:55)      INTERVAL HPI/OVERNIGHT EVENTS: Patient was seen and examined. No acute events occurred overnight. Today is the best that patient has felt. States that her nausea is OK. She is having some cough with thin liquids. She initially went to rehab with pureed and thickened liquids. Did well with swallow therapy in rehab and got to soft-bite sized/thin liquids over the course of a month. Spoke to father and daughter from [place] at bedside.    Spoke to son Thanh outside.    I&O's Summary    26 Apr 2025 07:01  -  27 Apr 2025 07:00  --------------------------------------------------------  IN: 0 mL / OUT: 550 mL / NET: -550 mL    27 Apr 2025 07:01  -  27 Apr 2025 18:42  --------------------------------------------------------  IN: 0 mL / OUT: 100 mL / NET: -100 mL        PAST MEDICAL & SURGICAL HISTORY:  DM (diabetes mellitus)      Multiple sclerosis      DM (diabetes mellitus)      HTN (hypertension)      No significant past surgical history          LABS:                        10.5   9.98  )-----------( 320      ( 27 Apr 2025 08:50 )             33.8     04-27    143  |  112[H]  |  12  ----------------------------<  218[H]  5.1   |  27  |  0.57    Ca    9.0      27 Apr 2025 08:50  Phos  2.9     04-27  Mg     2.0     04-27    TPro  6.8  /  Alb  2.3[L]  /  TBili  0.3  /  DBili  x   /  AST  18  /  ALT  15  /  AlkPhos  125[H]  04-26      Urinalysis Basic - ( 27 Apr 2025 08:50 )    Color: x / Appearance: x / SG: x / pH: x  Gluc: 218 mg/dL / Ketone: x  / Bili: x / Urobili: x   Blood: x / Protein: x / Nitrite: x   Leuk Esterase: x / RBC: x / WBC x   Sq Epi: x / Non Sq Epi: x / Bacteria: x      CAPILLARY BLOOD GLUCOSE      POCT Blood Glucose.: 158 mg/dL (27 Apr 2025 18:06)  POCT Blood Glucose.: 64 mg/dL (27 Apr 2025 17:36)  POCT Blood Glucose.: 66 mg/dL (27 Apr 2025 17:19)  POCT Blood Glucose.: 131 mg/dL (27 Apr 2025 12:22)  POCT Blood Glucose.: 204 mg/dL (27 Apr 2025 08:30)  POCT Blood Glucose.: 157 mg/dL (26 Apr 2025 21:08)        Urinalysis Basic - ( 27 Apr 2025 08:50 )    Color: x / Appearance: x / SG: x / pH: x  Gluc: 218 mg/dL / Ketone: x  / Bili: x / Urobili: x   Blood: x / Protein: x / Nitrite: x   Leuk Esterase: x / RBC: x / WBC x   Sq Epi: x / Non Sq Epi: x / Bacteria: x        MEDICATIONS  (STANDING):  amLODIPine   Tablet 5 milliGRAM(s) Oral daily  apixaban 5 milliGRAM(s) Oral every 12 hours  aspirin  chewable 81 milliGRAM(s) Oral daily  atorvastatin 20 milliGRAM(s) Oral at bedtime  ciprofloxacin     Tablet 500 milliGRAM(s) Oral every 12 hours  dextrose 5%. 1000 milliLiter(s) (50 mL/Hr) IV Continuous <Continuous>  dextrose 5%. 1000 milliLiter(s) (100 mL/Hr) IV Continuous <Continuous>  dextrose 50% Injectable 25 Gram(s) IV Push once  dextrose 50% Injectable 12.5 Gram(s) IV Push once  dextrose 50% Injectable 25 Gram(s) IV Push once  donepezil 5 milliGRAM(s) Oral at bedtime  glucagon  Injectable 1 milliGRAM(s) IntraMuscular once  insulin glargine Injectable (LANTUS) 15 Unit(s) SubCutaneous before breakfast  insulin lispro (ADMELOG) corrective regimen sliding scale   SubCutaneous three times a day before meals  insulin lispro (ADMELOG) corrective regimen sliding scale   SubCutaneous at bedtime  lisinopril 40 milliGRAM(s) Oral daily  metoclopramide 5 milliGRAM(s) Oral three times a day  metoprolol succinate ER 50 milliGRAM(s) Oral daily  mirtazapine 7.5 milliGRAM(s) Oral <User Schedule>  pantoprazole  Injectable 40 milliGRAM(s) IV Push daily  saccharomyces boulardii 250 milliGRAM(s) Oral two times a day    MEDICATIONS  (PRN):  acetaminophen     Tablet .. 650 milliGRAM(s) Oral every 6 hours PRN Temp greater or equal to 38C (100.4F), Mild Pain (1 - 3)  dextrose Oral Gel 15 Gram(s) Oral once PRN Blood Glucose LESS THAN 70 milliGRAM(s)/deciliter  dextrose Oral Gel 15 Gram(s) Oral once PRN Blood Glucose LESS THAN 70 milliGRAM(s)/deciliter  melatonin 3 milliGRAM(s) Oral at bedtime PRN Insomnia  ondansetron Injectable 4 milliGRAM(s) IV Push every 6 hours PRN Nausea and/or Vomiting      REVIEW OF SYSTEMS:  feels weak    RADIOLOGY & ADDITIONAL TESTS:    Imaging Personally Reviewed:  [x] YES  [ ] NO    Consultant(s) Notes Reviewed:  [x] YES  [ ] NO    PHYSICAL EXAM:  T(C): 36.9 (04-27-25 @ 12:39), Max: 36.9 (04-27-25 @ 06:45)  HR: 83 (04-27-25 @ 12:39) (83 - 99)  BP: 163/80 (04-27-25 @ 12:39) (148/77 - 163/80)  RR: 18 (04-27-25 @ 12:39) (18 - 18)  SpO2: 99% (04-27-25 @ 12:39) (98% - 100%)    GENERAL: NAD, thin frail  HEENT:  anicteric, moist mucous membranes  CHEST/LUNG:  CTA b/l, no rales, wheezes, or rhonchi  HEART:  RRR, S1, S2  ABDOMEN:  BS+, soft, nontender, nondistended  EXTREMITIES: LLE wrapped  NERVOUS SYSTEM: answers questions and follows commands appropriately  PSYCH: appropriate affect    Care Discussed with Consultants/Other Providers [x] YES  [ ] NO

## 2025-04-27 NOTE — CONSULT NOTE ADULT - SUBJECTIVE AND OBJECTIVE BOX
[facility]    Memo Rosalino Beck NP    [address] [place], [place] [id number] [phone]      Chief Complaint:  Patient is a 74y old  Female who presents with a chief complaint of Hyperglycemia (27 Apr 2025 09:48)      HPI:74y F with PMHx of CVA (presumed embolic, on apixaban), DM1, HTN, HLD, PAD admitted with LLE SSTI & COVID. Found to have paroxysmal atrial fibrillation, now back in [facility].  asked to eval for chronic nausea and dec po    Allergies:  No Known Allergies      Medications:  acetaminophen     Tablet .. 650 milliGRAM(s) Oral every 6 hours PRN  amLODIPine   Tablet 5 milliGRAM(s) Oral daily  apixaban 5 milliGRAM(s) Oral every 12 hours  aspirin  chewable 81 milliGRAM(s) Oral daily  atorvastatin 20 milliGRAM(s) Oral at bedtime  ciprofloxacin     Tablet 500 milliGRAM(s) Oral every 12 hours  dextrose 5%. 1000 milliLiter(s) IV Continuous <Continuous>  dextrose 5%. 1000 milliLiter(s) IV Continuous <Continuous>  dextrose 50% Injectable 25 Gram(s) IV Push once  dextrose 50% Injectable 12.5 Gram(s) IV Push once  dextrose 50% Injectable 25 Gram(s) IV Push once  dextrose Oral Gel 15 Gram(s) Oral once PRN  dextrose Oral Gel 15 Gram(s) Oral once PRN  donepezil 5 milliGRAM(s) Oral at bedtime  glucagon  Injectable 1 milliGRAM(s) IntraMuscular once  insulin glargine Injectable (LANTUS) 15 Unit(s) SubCutaneous before breakfast  insulin lispro (ADMELOG) corrective regimen sliding scale   SubCutaneous three times a day before meals  insulin lispro (ADMELOG) corrective regimen sliding scale   SubCutaneous at bedtime  lisinopril 40 milliGRAM(s) Oral daily  melatonin 3 milliGRAM(s) Oral at bedtime PRN  metoclopramide 5 milliGRAM(s) Oral three times a day  metoprolol succinate ER 50 milliGRAM(s) Oral daily  mirtazapine 7.5 milliGRAM(s) Oral <User Schedule>  ondansetron Injectable 4 milliGRAM(s) IV Push every 6 hours PRN  pantoprazole  Injectable 40 milliGRAM(s) IV Push daily  saccharomyces boulardii 250 milliGRAM(s) Oral two times a day      PMHX/PSHX:  DM (diabetes mellitus)    Multiple sclerosis    DM (diabetes mellitus)    HTN (hypertension)    No significant past surgical history        Family history:      Social History:     ROS:     unable to obtain        PHYSICAL EXAM:   Vital Signs:  Vital Signs Last 24 Hrs  T(C): 36.9 (27 Apr 2025 12:39), Max: 36.9 (27 Apr 2025 06:45)  T(F): 98.4 (27 Apr 2025 12:39), Max: 98.5 (27 Apr 2025 06:45)  HR: 83 (27 Apr 2025 12:39) (83 - 99)  BP: 163/80 (27 Apr 2025 12:39) (148/77 - 163/80)  BP(mean): --  RR: 18 (27 Apr 2025 12:39) (18 - 18)  SpO2: 99% (27 Apr 2025 12:39) (98% - 100%)    Parameters below as of 27 Apr 2025 12:39  Patient On (Oxygen Delivery Method): room air      Daily     Daily     nad  confused  frail  non toxic  soft, nt  no edema      LABS:                        10.5   9.98  )-----------( 320      ( 27 Apr 2025 08:50 )             33.8     04-27    143  |  112[H]  |  12  ----------------------------<  218[H]  5.1   |  27  |  0.57    Ca    9.0      27 Apr 2025 08:50  Phos  2.9     04-27  Mg     2.0     04-27    TPro  6.8  /  Alb  2.3[L]  /  TBili  0.3  /  DBili  x   /  AST  18  /  ALT  15  /  AlkPhos  125[H]  04-26    LIVER FUNCTIONS - ( 26 Apr 2025 09:19 )  Alb: 2.3 g/dL / Pro: 6.8 g/dL / ALK PHOS: 125 U/L / ALT: 15 U/L / AST: 18 U/L / GGT: x             Urinalysis Basic - ( 27 Apr 2025 08:50 )    Color: x / Appearance: x / SG: x / pH: x  Gluc: 218 mg/dL / Ketone: x  / Bili: x / Urobili: x   Blood: x / Protein: x / Nitrite: x   Leuk Esterase: x / RBC: x / WBC x   Sq Epi: x / Non Sq Epi: x / Bacteria: x          Imaging:

## 2025-04-27 NOTE — CONSULT NOTE ADULT - ASSESSMENT
Hypokalemia  Cellulitis  HTN  DM  COVID  FTT    -Hypokalemia due to GI loss/low intake. No signs of renal potassium wasting   -Replace K  -Will check TTKG and FeK if potassium continue to worsen  -Mg level normal  -No renal objection to dc but may need daily KCl  -Awaiting today's lab result 
Stage II sacral pressure ulcer, no infection.
74-year-old white female with PMH HTN, HLD, diabetes mellitus on insulin and PAD S/P multiple LLE angios with L SFA and pop in stentocclusion with vascular ulcers/gangrene left lower extremity. Presented for elective LLE angiogram and was noted with elevated WBC and glucose and sent to ED for medical optimization prior to surgery.  Significant wounds to LLE with sensory and motor dysfunction at baseline ? from MS    Admit to medicine  DM management and endocrine consult  Continue ASA and statin  LE duplex with H/O DVT(previously on Eliquis)   Offload/turn and position  [place] exchange and urine culture  Will reschedule LLE once medically optimized  Pt is at high risk for limb loss due to large area of tissue loss and severe PAD
nausea  dec po  covid  diabetes    suspect delayed gastric emptying  cont diet as tolerated  encourage glucerna  agree with reglan  proton pump inhibitor bid  d/w patients family bedside 
Assessment:  74-year-old white female  presently residing in rehab center with history of diabetes mellitus on insulin hypertension and hyperlipidemia who had developed vascular ulcers/gangrene left lower extremity, admitted for infection, consulted for new onset afib.     Plan:   - Patient without symptoms of angina or heart failure at this time.  - No clear evidence of acute ischemia, trops negative x 2. Will follow up third set.  - His CKs are flat, suggesting against acute atherosclerotic plaque rupture.  - Biomarker trend is not consistent with plaque rupture but rather demand ischemia. Monitor closely for the development of anginal symptoms or clinical signs of ischemia.   - Holter Monitor 2022: Sinus with PACs, no significant events   - Stress Testing 2022: Normal study, no EKG changes or chest pain, LVEF 65%   - EK bpm, Atrial fibrillation with rapid ventricular response, Marked ST abnormality, possible inferior subendocardial injury  - Prior EKG with Sinus Rhythm with PACs, HIE [phone]  - Per Tele Monitoring pt has been NSR since initiation of monitoring  - Afib likely reactive given infection and hx of PACs   - Repeat EKG to confirm sinus rhythm   - Recommend Toprol XL 25 mg qD beginning tonight or tomorrow to maintain sinus     - No meaningful evidence of volume overload.  - No prior TTE     - BP well controlled, monitor routine hemodynamics.  - Continue home medications   - Monitor and replete lytes, keep K>4, Mg>2.    - Other cardiovascular workup will depend on clinical course.  - All other workup per primary team.  - Will continue to follow.            
Physical Exam:   Vital Signs Last 24 Hrs  T(C): 36.6 (22 Apr 2025 05:22), Max: 36.9 (21 Apr 2025 20:13)  T(F): 97.8 (22 Apr 2025 05:22), Max: 98.5 (21 Apr 2025 20:13)  HR: 87 (22 Apr 2025 05:22) (83 - 97)  BP: 133/67 (22 Apr 2025 05:22) (119/65 - 133/67)  BP(mean): 70 (21 Apr 2025 14:51) (70 - 70)  RR: 17 (22 Apr 2025 05:22) (16 - 18)  SpO2: 97% (22 Apr 2025 05:22) (97% - 100%)    Parameters below as of 22 Apr 2025 05:22  Patient On (Oxygen Delivery Method): room air         CAPILLARY BLOOD GLUCOSE      POCT Blood Glucose.: 397 mg/dL (22 Apr 2025 08:24)  POCT Blood Glucose.: 353 mg/dL (21 Apr 2025 21:22)  POCT Blood Glucose.: 385 mg/dL (21 Apr 2025 17:15)  POCT Blood Glucose.: 483 mg/dL (21 Apr 2025 13:30)  POCT Blood Glucose.: 495 mg/dL (21 Apr 2025 13:24)  POCT Blood Glucose.: 522 mg/dL (21 Apr 2025 12:44)  POCT Blood Glucose.: 556 mg/dL (21 Apr 2025 12:42)  POCT Blood Glucose.: 510 mg/dL (21 Apr 2025 11:28)  POCT Blood Glucose.: 526 mg/dL (21 Apr 2025 11:24)        DIET: CC  <50%        
74-year-old white female  presently residing in rehab center with history of diabetes mellitus on insulin hypertension and hyperlipidemia who had developed vascular ulcers/gangrene left lower extremity a and he was evaluated as outpatient by vascular surgeon scheduled for surgery today and because of glucose being out of control was sent to the emergency department here at [place] for further evaluation care treatment and management as well as stabilization of patient from a metabolic and glucose standpoint. Palliative consulted to assist with goals of care given high risk of limb loss in a patient with significant debility.

## 2025-04-27 NOTE — PROGRESS NOTE ADULT - ASSESSMENT
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 Found to have paroxysmal atrial fibrillation, now back in SR.    Infected LLE Ulcers  Cellulitis/SSTI  PAD  - wound cx with pseudomonas - sensitivities noted - resistant to zosyn - changed to ciprofloxacin - QTC noted - no plans for vascular intervention will d/w ID further re: antibiotic course length (potentially 2 weeks)  - sepsis POA (evolving) secondary to SSTI & COVID - now resolved  - BCx negative. UCx with growth pending ID  - per vascular- no acute intervention now  - continue AC/aspirin + statin  - ID following Dr. Huitron/Blayne  - Vascular following Dr. Burden  - Disposition planning to [facility] - per son to return to previous facility - although now given worsening of GI symptoms will have to disposition accordingly    Nausea  Failure to thrive/Malnutrition  Depression  - per  report - nausea is not new - has been dealing with this for a while  -  reports patient takes a pill before eating  - suspect pt with gastroparesis and now with worsening symptoms due to COVID  - will trial some standing reglan + PPI BID  - downgrade diet to pureed with mildly thickened liquids - SLP evaluations  - continue nutrition supplements  - will trial remeron - daughter and family state to continue as pt slept the night and did well    COVID infection  - no role for remdesivir/decadron  - supportive care  - ID following    Atrial fibrillation, now in SR  Paroxsymal Atrial Tachycardia  - Afib with RVR vs PAT 4/23 AM likely reactive secondary to sepsis  - Reviewed initial EKG from 4/23 - d/w cardiology 4/25 - question if p wave present so possible this was PAT but cannot rule out atrial fibrillation  - unfortunately was placed on telemetry monitoring hours after initial EKG - has been SR since start of telemetry monitoring - regardless, has been having bursts of tachycardia & already on AC for history of embolic CVA, so will treat as such given current information & clinical status  - patient without documented history of afib although per outpatient review, had embolic CVA with suspected cardiogenic etiology  - suspect patient with paroxysmal atrial fibrillation with extreme stress i.e 4/23 with fever & sepsis  - continue home apixaban  - continue toprol XL  - TTE reviewed - normal EF, mild valvular regurgitations, mild DD  - cardiology consult Dr. Lanza appreciated    DM1, uncontrolled now with hypoglycemia  - A1C 7.8  - now with hypoglycemia due to decreased PO intake  - continue lantus 15u qAM  - DC premeal lispro 5u TID given low intake  - continue low dose insulin corrective scale  - monitor BG, hypoglycemia protocol  - endocrine follow up    Electrolyte abnormality  - improved  - monitor and replete lytes    HTN  - remains hypertensive  - will resume home dose amlodipine 10mg daily  - continue lisinopril 40mg  - continue toprol XL to 50mg daily  - hold lasix for now  - monitor HDs    History of embolic CVA  - continue apixaban + statin    Alzheimer Dementia  - continue donepezil    Prophylactic Measure  - VTE ppx: apixaban    GOC: DNR/DNI    Disposition: [facility]

## 2025-04-27 NOTE — SOCIAL WORK PROGRESS NOTE - NSSWPROGRESSNOTE_GEN_ALL_CORE
IMM given to lala JOHNSON#[phone] via phone. Educated his right to appeal on pt's behalf. SW will continue to follow.

## 2025-04-27 NOTE — CONSULT NOTE ADULT - SUBJECTIVE AND OBJECTIVE BOX
[facility]                             Nephrology and Hypertension                             Geovani Branch                                          [phone]     Patient is a 74y old  Female who presents with a chief complaint of Hyperglycemia (26 Apr 2025 19:48)       HPI:  74-year-old white female  presently residing in rehab center with history of diabetes mellitus on insulin hypertension and hyperlipidemia who had developed vascular ulcers/gangrene left lower extremity a and he was evaluated as outpatient by vascular surgeon scheduled for surgery today and because of glucose being out of control was sent to the emergency department here at [place] for further evaluation care treatment and management as well as stabilization of patient from a metabolic and glucose standpoint.  Patient states that on a good day her sugar runs 180-200.  No recent fever chills nausea vomiting or diarrhea.    Renal consulted on 4/27/25 for persistent hypokalemia    ED COURSE:  Vitals: T 97.7  F , 90 HR  ,  /47 , RR 16 , SpO2  100% on RA  Labs significant for: wbc 15.92, ,   Imaging: No acute infilitrate on chest x ray   EKG: NSR, qtc 430  Pt received: 10u admelog, zosyn 3.375 g, vanc 1g, LR 1L   (21 Apr 2025 12:55)       PAST MEDICAL & SURGICAL HISTORY:  DM (diabetes mellitus)      Multiple sclerosis      DM (diabetes mellitus)      HTN (hypertension)      No significant past surgical history           FAMILY HISTORY:  NC    Social History:Non smoker    MEDICATIONS  (STANDING):  amLODIPine   Tablet 5 milliGRAM(s) Oral daily  apixaban 5 milliGRAM(s) Oral every 12 hours  aspirin  chewable 81 milliGRAM(s) Oral daily  atorvastatin 20 milliGRAM(s) Oral at bedtime  ciprofloxacin     Tablet 500 milliGRAM(s) Oral every 12 hours  dextrose 5%. 1000 milliLiter(s) (50 mL/Hr) IV Continuous <Continuous>  dextrose 5%. 1000 milliLiter(s) (100 mL/Hr) IV Continuous <Continuous>  dextrose 50% Injectable 25 Gram(s) IV Push once  dextrose 50% Injectable 12.5 Gram(s) IV Push once  dextrose 50% Injectable 25 Gram(s) IV Push once  donepezil 5 milliGRAM(s) Oral at bedtime  glucagon  Injectable 1 milliGRAM(s) IntraMuscular once  insulin glargine Injectable (LANTUS) 15 Unit(s) SubCutaneous before breakfast  insulin lispro (ADMELOG) corrective regimen sliding scale   SubCutaneous three times a day before meals  insulin lispro (ADMELOG) corrective regimen sliding scale   SubCutaneous at bedtime  lactated ringers 1000 milliLiter(s) (60 mL/Hr) IV Continuous <Continuous>  lisinopril 40 milliGRAM(s) Oral daily  metoprolol succinate ER 50 milliGRAM(s) Oral daily  mirtazapine 7.5 milliGRAM(s) Oral <User Schedule>  saccharomyces boulardii 250 milliGRAM(s) Oral two times a day    MEDICATIONS  (PRN):  acetaminophen     Tablet .. 650 milliGRAM(s) Oral every 6 hours PRN Temp greater or equal to 38C (100.4F), Mild Pain (1 - 3)  dextrose Oral Gel 15 Gram(s) Oral once PRN Blood Glucose LESS THAN 70 milliGRAM(s)/deciliter  dextrose Oral Gel 15 Gram(s) Oral once PRN Blood Glucose LESS THAN 70 milliGRAM(s)/deciliter  melatonin 3 milliGRAM(s) Oral at bedtime PRN Insomnia  ondansetron Injectable 4 milliGRAM(s) IV Push every 6 hours PRN Nausea and/or Vomiting   Meds reviewed    Allergies    No Known Allergies    Intolerances         REVIEW OF SYSTEMS:    Review of Systems:   Constitutional: Denies fatigue  HEENT: Denies headaches and dizziness  Respiratory: denies SOB, cough, or wheezing  Cardiovascular: denies CP, palpitations  Gastrointestinal: Denies nausea, denies vomiting, diarrhea, constipation, abdominal pain, or bloody stools  Genitourinary: denies painful urination, increased frequency, urgency, or bloody urine  Skin: denies rashes or itching  Musculoskeletal: denies muscle aches, joint swelling  Neurologic: Denies generalized weakness, denies loss of sensation, numbness, or tingling      Vital Signs Last 24 Hrs  T(C): 36.9 (27 Apr 2025 06:45), Max: 36.9 (27 Apr 2025 06:45)  T(F): 98.5 (27 Apr 2025 06:45), Max: 98.5 (27 Apr 2025 06:45)  HR: 99 (27 Apr 2025 06:45) (86 - 99)  BP: 148/77 (27 Apr 2025 06:45) (134/81 - 154/75)  BP(mean): --  RR: 18 (27 Apr 2025 06:45) (18 - 18)  SpO2: 100% (27 Apr 2025 06:45) (98% - 100%)    Parameters below as of 27 Apr 2025 06:45  Patient On (Oxygen Delivery Method): room air      Daily     Daily     PHYSICAL EXAM:    GENERAL: NAD  HEAD:  Atraumatic, Normocephalic  EYES: EOMI, conjunctiva and sclera clear  ENMT: No Drainage from nares, No drainage from ears  NECK: Supple, neck  veins full  NERVOUS SYSTEM:  Awake and Alert  CHEST/LUNG: Clear to percussion bilaterally; No rales, rhonchi, wheezing, or rubs  HEART: Regular rate and rhythm; No murmurs, rubs, or gallops  ABDOMEN: Soft, Nontender, Nondistended; Bowel sounds present  EXTREMITIES:  No Edema  SKIN: No rashes No obvious ecchymosis      LABS:                        10.5   8.53  )-----------( 359      ( 26 Apr 2025 09:19 )             32.6     04-26    142  |  106  |  12  ----------------------------<  197[H]  3.0[L]   |  30  |  0.54    Ca    8.8      26 Apr 2025 09:19  Phos  2.6     04-26  Mg     2.0     04-26    TPro  6.8  /  Alb  2.3[L]  /  TBili  0.3  /  DBili  x   /  AST  18  /  ALT  15  /  AlkPhos  125[H]  04-26      Urinalysis Basic - ( 26 Apr 2025 09:19 )    Color: x / Appearance: x / SG: x / pH: x  Gluc: 197 mg/dL / Ketone: x  / Bili: x / Urobili: x   Blood: x / Protein: x / Nitrite: x   Leuk Esterase: x / RBC: x / WBC x   Sq Epi: x / Non Sq Epi: x / Bacteria: x      Magnesium: 2.0 mg/dL (04-26 @ 09:19)  Phosphorus: 2.6 mg/dL (04-26 @ 09:19)          RADIOLOGY & ADDITIONAL TESTS:

## 2025-04-27 NOTE — PROVIDER CONTACT NOTE (HYPOGLYCEMIA EVENT) - NS PROVIDER CONTACT CONTRIBUTING FACTORS OF EPISODE
Other (Specify)
Poor oral intake within the last 24 hours
Poor oral intake within the last 24 hours/Previous finger stick less than 100 mg/dL
Poor oral intake within the last 24 hours

## 2025-04-27 NOTE — CONSULT NOTE ADULT - CONSULT REASON
Sacral ulcer
dm2 uncontrolled
Left leg ulcers
nausea  dec po
Non healing LLE wounds
Hypokalemia
New Onset Afib
goals of care, vascular gangrene
74y  diabetes mellitus uncontrolled type 1

## 2025-04-27 NOTE — CONSULT NOTE ADULT - CONSULT REQUESTED BY NAME
ED
Dr. Plummer
Dr. King
[place]
DR. King
Dr. Navid Plummer
[facility]
Dr. Jose Guadalupe King
[facility] SINGH

## 2025-04-27 NOTE — CONSULT NOTE ADULT - REASON FOR ADMISSION
Hyperglycemia

## 2025-04-27 NOTE — PROVIDER CONTACT NOTE (HYPOGLYCEMIA EVENT) - NS PROVIDER CONTACT NOTE-TREATMENT-HYPO
Dextrose 50% 12.5 Grams IV Push
4 oz Fruit Juice...
Glucose gel 1 tube/Dextrose 50% 25 Grams IV Push/4 oz Fruit Juice...
Glucose gel 1 tube/Dextrose 50% 12.5 Grams IV Push/4 oz Fruit Juice...

## 2025-04-27 NOTE — CONSULT NOTE ADULT - CONSULT REQUESTED DATE/TIME
21-Apr-2025 11:37
22-Apr-2025
27-Apr-2025 08:42
24-Apr-2025 07:37
21-Apr-2025 15:30
21-Apr-2025 21:53
22-Apr-2025 08:37
22-Apr-2025 15:12
27-Apr-2025 17:24

## 2025-04-27 NOTE — PROVIDER CONTACT NOTE (HYPOGLYCEMIA EVENT) - NS PROVIDER CONTACT RECOMMEND-HYPO
MD ordered D5  MD ordered D5 @50cc/hr. Pt received Dextrose 50% 12.5 grams iv push. Will continue to monitor blood sugar Q30 minutes until >100

## 2025-04-27 NOTE — CONSULT NOTE ADULT - PROVIDER SPECIALTY LIST ADULT
Gastroenterology
Cardiology
Infectious Disease
Vascular Surgery
Wound Care
Palliative Care
Endocrinology
Nephrology
Diabetes

## 2025-04-28 LAB
ANION GAP SERPL CALC-SCNC: 6 MMOL/L — SIGNIFICANT CHANGE UP (ref 5–17)
BUN SERPL-MCNC: 10 MG/DL — SIGNIFICANT CHANGE UP (ref 7–23)
CALCIUM SERPL-MCNC: 8.7 MG/DL — SIGNIFICANT CHANGE UP (ref 8.5–10.1)
CHLORIDE SERPL-SCNC: 108 MMOL/L — SIGNIFICANT CHANGE UP (ref 96–108)
CO2 SERPL-SCNC: 29 MMOL/L — SIGNIFICANT CHANGE UP (ref 22–31)
CREAT SERPL-MCNC: 0.59 MG/DL — SIGNIFICANT CHANGE UP (ref 0.5–1.3)
EGFR: 93 ML/MIN/1.73M2 — SIGNIFICANT CHANGE UP
EGFR: 93 ML/MIN/1.73M2 — SIGNIFICANT CHANGE UP
GLUCOSE SERPL-MCNC: 255 MG/DL — HIGH (ref 70–99)
HCT VFR BLD CALC: 32.1 % — LOW (ref 34.5–45)
HGB BLD-MCNC: 9.8 G/DL — LOW (ref 11.5–15.5)
MAGNESIUM SERPL-MCNC: 2 MG/DL — SIGNIFICANT CHANGE UP (ref 1.6–2.6)
MCHC RBC-ENTMCNC: 29.3 PG — SIGNIFICANT CHANGE UP (ref 27–34)
MCHC RBC-ENTMCNC: 30.5 G/DL — LOW (ref 32–36)
MCV RBC AUTO: 95.8 FL — SIGNIFICANT CHANGE UP (ref 80–100)
NRBC BLD AUTO-RTO: 0 /100 WBCS — SIGNIFICANT CHANGE UP (ref 0–0)
PHOSPHATE SERPL-MCNC: 2.7 MG/DL — SIGNIFICANT CHANGE UP (ref 2.5–4.5)
PLATELET # BLD AUTO: 299 K/UL — SIGNIFICANT CHANGE UP (ref 150–400)
POTASSIUM SERPL-MCNC: 3.9 MMOL/L — SIGNIFICANT CHANGE UP (ref 3.5–5.3)
POTASSIUM SERPL-SCNC: 3.9 MMOL/L — SIGNIFICANT CHANGE UP (ref 3.5–5.3)
RBC # BLD: 3.35 M/UL — LOW (ref 3.8–5.2)
RBC # FLD: 14.7 % — HIGH (ref 10.3–14.5)
SODIUM SERPL-SCNC: 143 MMOL/L — SIGNIFICANT CHANGE UP (ref 135–145)
WBC # BLD: 10.44 K/UL — SIGNIFICANT CHANGE UP (ref 3.8–10.5)
WBC # FLD AUTO: 10.44 K/UL — SIGNIFICANT CHANGE UP (ref 3.8–10.5)

## 2025-04-28 PROCEDURE — 99232 SBSQ HOSP IP/OBS MODERATE 35: CPT

## 2025-04-28 PROCEDURE — G0545: CPT

## 2025-04-28 RX ORDER — INSULIN GLARGINE-YFGN 100 [IU]/ML
12 INJECTION, SOLUTION SUBCUTANEOUS
Refills: 0 | Status: DISCONTINUED | OUTPATIENT
Start: 2025-04-28 | End: 2025-04-30

## 2025-04-28 RX ORDER — INSULIN GLARGINE-YFGN 100 [IU]/ML
15 INJECTION, SOLUTION SUBCUTANEOUS
Refills: 0 | Status: DISCONTINUED | OUTPATIENT
Start: 2025-04-28 | End: 2025-04-28

## 2025-04-28 RX ADMIN — INSULIN GLARGINE-YFGN 10 UNIT(S): 100 INJECTION, SOLUTION SUBCUTANEOUS at 08:32

## 2025-04-28 RX ADMIN — INSULIN LISPRO 1: 100 INJECTION, SOLUTION INTRAVENOUS; SUBCUTANEOUS at 17:47

## 2025-04-28 RX ADMIN — METOPROLOL SUCCINATE 50 MILLIGRAM(S): 50 TABLET, EXTENDED RELEASE ORAL at 06:49

## 2025-04-28 RX ADMIN — Medication 5 MILLIGRAM(S): at 06:50

## 2025-04-28 RX ADMIN — Medication 40 MILLIGRAM(S): at 17:48

## 2025-04-28 RX ADMIN — Medication 81 MILLIGRAM(S): at 15:16

## 2025-04-28 RX ADMIN — BUTYROSPERMUM PARKII(SHEA BUTTER), SIMMONDSIA CHINENSIS (JOJOBA) SEED OIL, ALOE BARBADENSIS LEAF EXTRACT 250 MILLIGRAM(S): .01; 1; 3.5 LIQUID TOPICAL at 06:49

## 2025-04-28 RX ADMIN — Medication 500 MILLIGRAM(S): at 17:48

## 2025-04-28 RX ADMIN — APIXABAN 5 MILLIGRAM(S): 2.5 TABLET, FILM COATED ORAL at 06:49

## 2025-04-28 RX ADMIN — Medication 5 MILLIGRAM(S): at 22:29

## 2025-04-28 RX ADMIN — DONEPEZIL HYDROCHLORIDE 5 MILLIGRAM(S): 5 TABLET ORAL at 22:29

## 2025-04-28 RX ADMIN — APIXABAN 5 MILLIGRAM(S): 2.5 TABLET, FILM COATED ORAL at 17:48

## 2025-04-28 RX ADMIN — BUTYROSPERMUM PARKII(SHEA BUTTER), SIMMONDSIA CHINENSIS (JOJOBA) SEED OIL, ALOE BARBADENSIS LEAF EXTRACT 250 MILLIGRAM(S): .01; 1; 3.5 LIQUID TOPICAL at 17:48

## 2025-04-28 RX ADMIN — ATORVASTATIN CALCIUM 20 MILLIGRAM(S): 80 TABLET, FILM COATED ORAL at 22:29

## 2025-04-28 RX ADMIN — MIRTAZAPINE 7.5 MILLIGRAM(S): 30 TABLET, FILM COATED ORAL at 19:22

## 2025-04-28 RX ADMIN — INSULIN LISPRO 1: 100 INJECTION, SOLUTION INTRAVENOUS; SUBCUTANEOUS at 13:06

## 2025-04-28 RX ADMIN — Medication 500 MILLIGRAM(S): at 06:49

## 2025-04-28 RX ADMIN — Medication 40 MILLIGRAM(S): at 06:51

## 2025-04-28 RX ADMIN — LISINOPRIL 40 MILLIGRAM(S): 5 TABLET ORAL at 06:49

## 2025-04-28 RX ADMIN — AMLODIPINE BESYLATE 10 MILLIGRAM(S): 10 TABLET ORAL at 06:52

## 2025-04-28 RX ADMIN — Medication 4 MILLIGRAM(S): at 10:10

## 2025-04-28 RX ADMIN — INSULIN LISPRO 2: 100 INJECTION, SOLUTION INTRAVENOUS; SUBCUTANEOUS at 08:32

## 2025-04-28 RX ADMIN — Medication 5 MILLIGRAM(S): at 15:15

## 2025-04-28 NOTE — PHYSICAL THERAPY INITIAL EVALUATION ADULT - PERTINENT HX OF CURRENT PROBLEM, REHAB EVAL
74-year-old white female  presently residing in rehab center with history of diabetes mellitus on insulin hypertension and hyperlipidemia who had developed vascular ulcers/gangrene left lower extremity a and he was evaluated as outpatient by vascular surgeon scheduled for surgery today and because of glucose being out of control was sent to the emergency department here at [place] for further evaluation care treatment and management as well as stabilization of patient from a metabolic and glucose standpoint.  Patient states that on a good day her sugar runs 180-200.  No recent fever chills nausea vomiting or diarrhea. PMH : MS

## 2025-04-28 NOTE — PROGRESS NOTE ADULT - SUBJECTIVE AND OBJECTIVE BOX
[facility] -- Chikis Sanders Pannella, Patel, Savella Goodger, Cohen  Office # [phone]      Follow Up:    AF  Subjective/Observations:   Seen bedside eating breakfast with aide, nonverbal in NAD     REVIEW OF SYSTEMS: All other review of systems is negative unless indicated above    PAST MEDICAL & SURGICAL HISTORY:  DM (diabetes mellitus)      Multiple sclerosis      DM (diabetes mellitus)      HTN (hypertension)      No significant past surgical history          MEDICATIONS  (STANDING):  amLODIPine   Tablet 10 milliGRAM(s) Oral daily  apixaban 5 milliGRAM(s) Oral every 12 hours  aspirin  chewable 81 milliGRAM(s) Oral daily  atorvastatin 20 milliGRAM(s) Oral at bedtime  ciprofloxacin     Tablet 500 milliGRAM(s) Oral every 12 hours  dextrose 5%. 1000 milliLiter(s) (100 mL/Hr) IV Continuous <Continuous>  dextrose 5%. 1000 milliLiter(s) (50 mL/Hr) IV Continuous <Continuous>  dextrose 50% Injectable 25 Gram(s) IV Push once  dextrose 50% Injectable 12.5 Gram(s) IV Push once  dextrose 50% Injectable 25 Gram(s) IV Push once  donepezil 5 milliGRAM(s) Oral at bedtime  glucagon  Injectable 1 milliGRAM(s) IntraMuscular once  insulin glargine Injectable (LANTUS) 10 Unit(s) SubCutaneous before breakfast  insulin lispro (ADMELOG) corrective regimen sliding scale   SubCutaneous three times a day before meals  insulin lispro (ADMELOG) corrective regimen sliding scale   SubCutaneous at bedtime  lisinopril 40 milliGRAM(s) Oral daily  metoclopramide 5 milliGRAM(s) Oral three times a day  metoprolol succinate ER 50 milliGRAM(s) Oral daily  mirtazapine 7.5 milliGRAM(s) Oral <User Schedule>  pantoprazole    Tablet 40 milliGRAM(s) Oral two times a day  saccharomyces boulardii 250 milliGRAM(s) Oral two times a day    MEDICATIONS  (PRN):  acetaminophen     Tablet .. 650 milliGRAM(s) Oral every 6 hours PRN Temp greater or equal to 38C (100.4F), Mild Pain (1 - 3)  dextrose Oral Gel 15 Gram(s) Oral once PRN Blood Glucose LESS THAN 70 milliGRAM(s)/deciliter  dextrose Oral Gel 15 Gram(s) Oral once PRN Blood Glucose LESS THAN 70 milliGRAM(s)/deciliter  melatonin 3 milliGRAM(s) Oral at bedtime PRN Insomnia  ondansetron Injectable 4 milliGRAM(s) IV Push every 6 hours PRN Nausea and/or Vomiting      Allergies    No Known Allergies    Intolerances        Vital Signs Last 24 Hrs  T(C): 36.8 (2025 06:49), Max: 36.9 (2025 12:39)  T(F): 98.2 (2025 06:49), Max: 98.4 (2025 12:39)  HR: 95 (2025 06:49) (83 - 95)  BP: 155/74 (2025 06:49) (117/72 - 163/80)  BP(mean): --  RR: 16 (2025 06:49) (16 - 18)  SpO2: 100% (2025 06:49) (95% - 100%)    Parameters below as of 2025 06:49  Patient On (Oxygen Delivery Method): room air        I&O's Summary    2025 07:01  -  2025 07:00  --------------------------------------------------------  IN: 550 mL / OUT: 500 mL / NET: 50 mL          PHYSICAL  Tele SR   Constitutional: NAD, awake and alert, well-developed  HEENT: Moist Mucous Membranes, Anicteric  Pulmonary: Non-labored, breath sounds are clear bilaterally, No wheezing, crackles or rhonchi  Cardiovascular: Regular, S1 and S2 nl, No murmurs, rubs, gallops or clicks  Gastrointestinal: Bowel Sounds present, soft, nontender.   Lymph: No lymphadenopathy. No peripheral edema.  Skin: No visible rashes or ulcers. leg dressing   Psych:  nonverbal     LABS: All Labs Reviewed:                        9.8    10.44 )-----------( 299      ( 2025 07:20 )             32.1                         10.5   9.98  )-----------( 320      ( 2025 08:50 )             33.8                         10.5   8.53  )-----------( 359      ( 2025 09:19 )             32.6     2025 07:20    143    |  108    |  10     ----------------------------<  255    3.9     |  29     |  0.59   2025 08:50    143    |  112    |  12     ----------------------------<  218    5.1     |  27     |  0.57   2025 09:19    142    |  106    |  12     ----------------------------<  197    3.0     |  30     |  0.54     Ca    8.7        2025 07:20  Ca    9.0        2025 08:50  Ca    8.8        2025 09:19  Phos  2.7       2025 07:20  Phos  2.9       2025 08:50  Phos  2.6       2025 09:19  Mg     2.0       2025 07:20  Mg     2.0       2025 08:50  Mg     2.0       2025 09:19    TPro  6.8    /  Alb  2.3    /  TBili  0.3    /  DBili  x      /  AST  18     /  ALT  15     /  AlkPhos  125    2025 09:19             EC Lead ECG:   Ventricular Rate 83 BPM    Atrial Rate 83 BPM    P-R Interval 114 ms    QRS Duration 74 ms    Q-T Interval 364 ms    QTC Calculation(Bazett) 427 ms    P Axis 96 degrees    R Axis 40 degrees    T Axis 83 degrees    Diagnosis Line Sinus rhythm with occasional premature ventricular complexes  Minimal voltage criteria for LVH, may be normal variant ( Sokolow-Riley )  Nonspecific ST and T wave abnormality  Abnormal ECG  When compared with ECG of 2025 10:08,  premature ventricular complexes arenow present  Confirmed by Keren Watson ([id number]) on 2025 6:44:47 AM (25 @ 13:14)      TRANSTHORACIC ECHOCARDIOGRAM REPORT  ________________________________________________________________________________                                      _______       Pt. Name:       TERESA MCHUGH Study Date:    2025  MRN:            [id number]       YOB: 1950  Accession #:    [id number]       Age:           74 years  Account#:       [id number]      Gender:        F  Heart Rate:                     Height:        62.99 in (160.00 cm)  Rhythm:                         Weight:        125.66 lb (57.00 kg)  Blood Pressure: 163/69 mmHg     BSA/BMI:       1.59 m² / 22.27 kg/m²  ________________________________________________________________________________________  Referring Physician:    [id number] Navid Plummer  Interpreting Physician: Lopez Cano M.D.  Primary Sonographer:    Agustin Quesada    CPT:               ECHO TTE WO CON COMP W DOPP - 50031.m  Indication(s):     Unspecified atrial fibrillation - I48.91  Procedure:         Transthoracic echocardiogram with 2-D, M-mode and complete                     spectral and color flow Doppler.  Ordering Location: [facility]  Admission Status:  Inpatient  Study Information: Image quality for this study is technically difficult.    _______________________________________________________________________________________     CONCLUSIONS:      1. Technically difficult image quality.   2. Left ventricular cavity is normal in size. Left ventricular systolic function is normal with an ejection fraction of 64 % by Pond's method of disks.   3. Normal right ventricular cavity size and normal right ventricular systolic function.   4. Mild mitral regurgitation.   5. Mild tricuspid regurgitation.   6. There is focal calcification of the aortic valve leaflets.   7. Estimated pulmonary artery systolic pressure is 29 mmHg.   8. There is mild (grade 1) left ventricular diastolic dysfunction.    ________________________________________________________________________________________  FINDINGS:     Left Ventricle:  The left ventricular cavity is normal in size. Left ventricular systolic function is normal with a calculated ejection fraction of 64 % by the Pond's biplane method of disks. There is normal LV mass and concentric remodeling. There is mild (grade 1) left ventricular diastolic dysfunction.     Right Ventricle:  The right ventricular cavity is normal in size and right ventricular systolic function is normal. Tricuspid annular plane systolic excursion (TAPSE) is 2.0 cm (normal >=1.7 cm).     Left Atrium:  The left atrium is normal in size.     Right Atrium:  The right atrium is normal in size with an indexed volume of 13.61 ml/m² and an indexed area of 6.36 cm²/m².     Interatrial Septum:  The interatrial septum appears intact.     Aortic Valve:  The aortic valve appears trileaflet with normal systolic excursion. There is focal calcification of the aortic valve leaflets. There is fibrocalcific aortic valve sclerosis without stenosis. There is no evidence of aortic regurgitation.     Mitral Valve:  There is mitral valve thickening of the anterior and posterior leaflets. There is calcification of the mitral valve annulus. There is mild mitral regurgitation.     Tricuspid Valve:  Structurally normal tricuspid valve with normal leaflet excursion. Thereis mild tricuspid regurgitation. Estimated pulmonary artery systolic pressure is 29 mmHg.     Pulmonic Valve:  The pulmonic valve was not well visualized.     Aorta:  The aortic root at the sinuses of Valsalva is normal in size, measuring 2.90 cm (indexed 1.83 cm/m²). The ascending aorta is normal in size, measuring 2.80 cm (indexed 1.76 cm/m²). The aortic arch diameter is normal in size, measuring 2.6 cm (indexed 1.64 cm/m²).     Pericardium:  No pericardial effusion seen.     Systemic Veins:  The inferior vena cava is normal in size measuring 1.14 cm in diameter, (normal <2.1cm) with normal inspiratory collapse (normal >50%) consistent with normal right atrial pressure (~3, range 0-5mmHg).  ____________________________________________________________________  QUANTITATIVE DATA:  Left Ventricle Measurements: (Indexed to BSA)     IVSd (2D):   1.1 cm  LVPWd (2D):  1.0 cm  LVIDd (2D):  3.9 cm  LVIDs (2D):  2.6 cm  LV Mass:     139 g  87.8 g/m²  LV Vol d, MOD A2C: 47.2 ml 29.74 ml/m²  LV Vold, MOD A4C: 53.1 ml 33.46 ml/m²  LV Vol d, MOD BP:  53.1 ml 33.44 ml/m²  LV Vol s, MOD A2C: 17.8 ml 11.22 ml/m²  LV Vol s, MOD A4C: 19.7 ml 12.41 ml/m²  LV Vol s, MOD BP:  19.3 ml 12.18 ml/m²  LVOT SV MOD BP:    33.7 ml  LV EF% MOD BP:     64 %     MV E Vmax:    0.98 m/s  MV A Vmax:    1.35 m/s  MV E/A:       0.73  e' lateral:   8.81 cm/s  e' medial:    9.79 cm/s  E/e' lateral: 11.12  E/e' medial:  10.01  E/e' Average: 10.54  MV DT:        218 msec    Aorta Measurements: (Normal range) (Indexed to BSA)     Ao Root d     2.90 cm (2.7 - 3.3 cm) 1.83 cm/m²  Ao Asc d, 2D: 2.80  Ao Asc prox:  2.80 cm                1.76 cm/m²  Ao Arch:      2.6 cm            Left Atrium Measurements: (Indexed to BSA)  LA Diam 2D: 2.90 cm         Right Ventricle Measurements: Right Atrial Measurements:     TAPSE:           2.0 cm       RA Vol s, MOD A4C         21.6 ml  RV Base (RVID1): 3.6 cm       RA Vol s, MOD A4C i BSA   13.61 ml/m²  RV Mid (RVID2):  2.7 cm       RA Area s, MOD A4C        10.1 cm²                       RA Area s, MOD A4C, i BSA 6.36 cm²/m²       LVOT / RVOT/ Qp/Qs Data: (Indexed to BSA)  LVOT Diameter,s: 1.90 cm  LVOT Area:       2.84 cm²  LVOT Vmax:       1.16 m/s  LVOT Vmn:        0.749 m/s  LVOT VTI:        22.30 cm  LVOT peak grad:  5 mmHg  LVOT mean grad:  2.0 mmHg  LVOT SV:         63.2 ml   39.84 ml/m²    Aortic Valve Measurements:  AV Vmax:                1.2 m/s  AV Peak Gradient:       6.1 mmHg  AV Mean Gradient:       3.0 mmHg  AV VTI:                 24.4 cm  AVVTI Ratio:           0.91  AoV EOA, Contin:        2.59 cm²  AoV EOA, Contin i:      1.63 cm²/m²  AoV area, (CE):         2.59 cm²  AoV area, (CE), i:      1.63 cm²/m²  AoV Dimensionless Index 0.91    Mitral Valve Measurements:     MV E Vmax: 1.0 m/s        MR Vmax:          2.67 m/s  MV A Vmax: 1.4 m/s         MR Peak Gradient: 28.5 mmHg  MV E/A:    0.7       Tricuspid Valve Measurements:     TR Vmax:          2.6 m/s  TR Peak Gradient: 26.0 mmHg  RA Pressure:      3 mmHg  PASP:             29 mmHg    ________________________________________________________________________________________  Electronically signed on 2025 at 2:29:29 PM by Lopez Cano M.D.         *** Final ***      Radiology:

## 2025-04-28 NOTE — PROVIDER CONTACT NOTE (HYPOGLYCEMIA EVENT) - NS PROVIDER CONTACT BACKGROUND-HYPO
Age: 74y    Gender: Female    POCT Blood Glucose:  124 mg/dL (04-28-25 @ 00:18)  70 mg/dL (04-27-25 @ 23:37)  75 mg/dL (04-27-25 @ 22:43)  84 mg/dL (04-27-25 @ 22:19)  61 mg/dL (04-27-25 @ 21:01)  70 mg/dL (04-27-25 @ 20:52)  158 mg/dL (04-27-25 @ 18:06)  64 mg/dL (04-27-25 @ 17:36)      eMAR:atorvastatin   20 milliGRAM(s) Oral (04-27-25 @ 21:21)    dextrose 50% Injectable   12.5 Gram(s) IV Push (04-27-25 @ 21:20)    dextrose 50% Injectable   25 Gram(s) IV Push (04-27-25 @ 23:51)    dextrose 50% Injectable   12.5 Gram(s) IV Push (04-27-25 @ 17:45)    dextrose Oral Gel   15 Gram(s) Oral (04-27-25 @ 17:31)    insulin glargine Injectable (LANTUS)   15 Unit(s) SubCutaneous (04-27-25 @ 08:56)    insulin lispro (ADMELOG) corrective regimen sliding scale   2 Unit(s) SubCutaneous (04-27-25 @ 08:55)    
Age: 74y    Gender: Female  Pt was hypoglycemic. blood sugar was less than 70. Pt could not tolerate the juice or dextrose gel. 12.5 dextrose giving. Recheck blood sugar q15 min. Blood sugar is 150. pt is stable.   POCT Blood Glucose:    158 mg/dL (04-27-25 @ 18:06)  64 mg/dL (04-27-25 @ 17:36)  66 mg/dL (04-27-25 @ 17:19)  131 mg/dL (04-27-25 @ 12:22)  204 mg/dL (04-27-25 @ 08:30)  157 mg/dL (04-26-25 @ 21:08)      eMAR:atorvastatin   20 milliGRAM(s) Oral (04-26-25 @ 21:32)    dextrose 50% Injectable   12.5 Gram(s) IV Push (04-27-25 @ 17:45)    dextrose Oral Gel   15 Gram(s) Oral (04-27-25 @ 17:31)        
Age: 74y    Gender: Female    POCT Blood Glucose:  172 mg/dL (04-26-25 @ 13:23)  62 mg/dL (04-26-25 @ 12:42)  61 mg/dL (04-26-25 @ 12:28)  188 mg/dL (04-26-25 @ 08:23)  123 mg/dL (04-25-25 @ 21:10)  117 mg/dL (04-25-25 @ 17:48)      eMAR:atorvastatin   20 milliGRAM(s) Oral (04-25-25 @ 21:25)    insulin glargine Injectable (LANTUS)   15 Unit(s) SubCutaneous (04-26-25 @ 08:52)    insulin lispro (ADMELOG) corrective regimen sliding scale   1 Unit(s) SubCutaneous (04-26-25 @ 08:51)    insulin lispro Injectable (ADMELOG)   5 Unit(s) SubCutaneous (04-26-25 @ 08:50)    
Age: 74y    Gender: Female    POCT Blood Glucose:  239 mg/dL (04-22-25 @ 21:47)  87 mg/dL (04-22-25 @ 20:58)  59 mg/dL (04-22-25 @ 20:31)  54 mg/dL (04-22-25 @ 20:16)  60 mg/dL (04-22-25 @ 20:14)  92 mg/dL (04-22-25 @ 17:26)  291 mg/dL (04-22-25 @ 12:05)  333 mg/dL (04-22-25 @ 10:10)      eMAR:atorvastatin   20 milliGRAM(s) Oral (04-22-25 @ 21:51)    dextrose 50% Injectable   25 Gram(s) IV Push (04-22-25 @ 21:20)    dextrose Oral Gel   15 Gram(s) Oral (04-22-25 @ 20:39)    insulin glargine Injectable (LANTUS)   15 Unit(s) SubCutaneous (04-22-25 @ 08:37)    insulin lispro (ADMELOG) corrective regimen sliding scale   6 Unit(s) SubCutaneous (04-22-25 @ 12:48)   10 Unit(s) SubCutaneous (04-22-25 @ 08:36)    insulin lispro Injectable (ADMELOG)   5 Unit(s) SubCutaneous (04-22-25 @ 17:35)   5 Unit(s) SubCutaneous (04-22-25 @ 12:48)   5 Unit(s) SubCutaneous (04-22-25 @ 08:37)    
Age: 74y    Gender: Female    POCT Blood Glucose:  61 mg/dL (04-27-25 @ 21:01)  70 mg/dL (04-27-25 @ 20:52)  158 mg/dL (04-27-25 @ 18:06)  64 mg/dL (04-27-25 @ 17:36)  66 mg/dL (04-27-25 @ 17:19)  131 mg/dL (04-27-25 @ 12:22)  204 mg/dL (04-27-25 @ 08:30)  157 mg/dL (04-26-25 @ 21:08)      eMAR:atorvastatin   20 milliGRAM(s) Oral (04-26-25 @ 21:32)    dextrose 50% Injectable   12.5 Gram(s) IV Push (04-27-25 @ 17:45)    dextrose Oral Gel   15 Gram(s) Oral (04-27-25 @ 17:31)    insulin glargine Injectable (LANTUS)   15 Unit(s) SubCutaneous (04-27-25 @ 08:56)    insulin lispro (ADMELOG) corrective regimen sliding scale   2 Unit(s) SubCutaneous (04-27-25 @ 08:55)

## 2025-04-28 NOTE — PROGRESS NOTE ADULT - ASSESSMENT
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 Found to have paroxysmal atrial fibrillation, now back in SR.    Infected LLE Ulcers  Cellulitis/SSTI  PAD  - wound cx with pseudomonas - sensitivities noted - resistant to zosyn - changed to ciprofloxacin - QTC noted - no plans for vascular intervention will d/w ID further re: antibiotic course length (potentially 2 weeks)  - sepsis POA (evolving) secondary to SSTI & COVID - now resolved  - BCx negative. UCx with growth pending ID  - per vascular- no acute intervention now  - continue AC/aspirin + statin  - ID following Dr. Huitron/Blayne  - Vascular following Dr. Burden  - Disposition planning to [facility] - per son to return to previous facility - although now given worsening of GI symptoms will have to disposition accordingly    Nausea  Failure to thrive/Malnutrition  Depression  - per  report - nausea is not new - has been dealing with this for a while  -  reports patient takes a pill before eating  - suspect pt with gastroparesis and now with worsening symptoms due to COVID  - will trial some standing reglan + PPI BID  - downgrade diet to pureed with mildly thickened liquids - SLP evaluations  - continue nutrition supplements  - will trial remeron - daughter and family state to continue as pt slept the night and did well    COVID infection  - no role for remdesivir/decadron  - supportive care  - ID following    Atrial fibrillation, now in SR  Paroxsymal Atrial Tachycardia  - Afib with RVR vs PAT 4/23 AM likely reactive secondary to sepsis  - Reviewed initial EKG from 4/23 - d/w cardiology 4/25 - question if p wave present so possible this was PAT but cannot rule out atrial fibrillation  - unfortunately was placed on telemetry monitoring hours after initial EKG - has been SR since start of telemetry monitoring - regardless, has been having bursts of tachycardia & already on AC for history of embolic CVA, so will treat as such given current information & clinical status  - patient without documented history of afib although per outpatient review, had embolic CVA with suspected cardiogenic etiology  - suspect patient with paroxysmal atrial fibrillation with extreme stress i.e 4/23 with fever & sepsis  - continue home apixaban  - continue toprol XL  - TTE reviewed - normal EF, mild valvular regurgitations, mild DD  - cardiology consult Dr. Lanza appreciated    DM1, uncontrolled now with hypoglycemia  - A1C 7.8  - now with hypoglycemia due to decreased PO intake  - continue lantus 15u qAM  - DC premeal lispro 5u TID given low intake  - continue low dose insulin corrective scale  - monitor BG, hypoglycemia protocol  - endocrine follow up    Electrolyte abnormality  - improved  - monitor and replete lytes    HTN  - remains hypertensive  - will resume home dose amlodipine 10mg daily  - continue lisinopril 40mg  - continue toprol XL to 50mg daily  - hold lasix for now  - monitor HDs    History of embolic CVA  - continue apixaban + statin    Alzheimer Dementia  - continue donepezil    Prophylactic Measure  - VTE ppx: apixaban    GOC: DNR/DNI    Disposition: [facility] 74y F with PMHx of CVA (presumed embolic, on apixaban), DM1, HTN, HLD, PAD admitted with LLE SSTI & COVID. Found to have paroxysmal atrial fibrillation, now back in SR.    Infected LLE Ulcers  Cellulitis/SSTI  PAD  - wound cx with pseudomonas - sensitivities noted - resistant to zosyn - changed to ciprofloxacin - QTC noted - no plans for vascular intervention will d/w ID further re: antibiotic course length (potentially 2 weeks)  - sepsis POA (evolving) secondary to SSTI & COVID - now resolved  - BCx negative. UCx with growth pending ID  - per vascular- no acute intervention now  - continue AC/aspirin + statin  - ID following Dr. Huitron/Blayne  - Vascular following Dr. Burden  - Disposition planning to [facility]  - PT consulted  - Discharge planning.    Nausea  Failure to thrive/Malnutrition  Depression  - per  report - nausea is not new - has been dealing with this for a while  -  reports patient takes a pill before eating  - suspect pt with gastroparesis and now with worsening symptoms due to COVID  - will trial some standing reglan + PPI BID -->improving  - downgrade diet to pureed with mildly thickened liquids - SLP evaluation pending?  - continue nutrition supplements  - will continue remeron    COVID infection  - no role for remdesivir/decadron  - supportive care  - ID following    Atrial fibrillation, now in SR  Paroxsymal Atrial Tachycardia  - Afib with RVR vs PAT 4/23 AM likely reactive secondary to sepsis  - Reviewed initial EKG from 4/23 - d/w cardiology 4/25 - question if p wave present so possible this was PAT but cannot rule out atrial fibrillation  - unfortunately was placed on telemetry monitoring hours after initial EKG - has been SR since start of telemetry monitoring - regardless, has been having bursts of tachycardia & already on AC for history of embolic CVA, so will treat as such given current information & clinical status  - patient without documented history of afib although per outpatient review, had embolic CVA with suspected cardiogenic etiology  - suspect patient with paroxysmal atrial fibrillation with extreme stress i.e 4/23 with fever & sepsis  - continue home apixaban  - continue toprol XL  - TTE reviewed - normal EF, mild valvular regurgitations, mild DD  - cardiology consult Dr. Lanza appreciated    DM1, uncontrolled  - A1C 7.8  - had hypoglycemia due to poor PO so I reduced lantus to 10u for this AM  - now eating more and without nausea at all - glucose trends noted - increase to 12u  - DC premeal lispro 5u TID given low intake  - continue low dose insulin corrective scale  - monitor BG, hypoglycemia protocol  - endocrine follow up    Electrolyte abnormality  - improved  - monitor and replete lytes    HTN  - BP well controlled  - continue home dose amlodipine 10mg daily  - continue lisinopril 40mg  - continue toprol XL to 50mg daily  - hold lasix for now  - monitor HDs    History of embolic CVA  - continue apixaban + statin    Alzheimer Dementia  - continue donepezil    Prophylactic Measure  - VTE ppx: apixaban    GOC: DNR/DNI    Disposition: [facility]

## 2025-04-28 NOTE — PROGRESS NOTE ADULT - SUBJECTIVE AND OBJECTIVE BOX
[facility]                             Nephrology and Hypertension                             Geovani Branch                                          [phone]     Patient is a 74y old  Female who presents with a chief complaint of Hyperglycemia (26 Apr 2025 19:48)       HPI:  74-year-old white female  presently residing in rehab center with history of diabetes mellitus on insulin hypertension and hyperlipidemia who had developed vascular ulcers/gangrene left lower extremity a and he was evaluated as outpatient by vascular surgeon scheduled for surgery today and because of glucose being out of control was sent to the emergency department here at [place] for further evaluation care treatment and management as well as stabilization of patient from a metabolic and glucose standpoint.  Patient states that on a good day her sugar runs 180-200.  No recent fever chills nausea vomiting or diarrhea.    Renal consulted on 4/27/25 for persistent hypokalemia    No new complaints       PAST MEDICAL & SURGICAL HISTORY:  DM (diabetes mellitus)      Multiple sclerosis      DM (diabetes mellitus)      HTN (hypertension)      No significant past surgical history           FAMILY HISTORY:  NC    Social History:Non smoker    MEDICATIONS  (STANDING):  amLODIPine   Tablet 5 milliGRAM(s) Oral daily  apixaban 5 milliGRAM(s) Oral every 12 hours  aspirin  chewable 81 milliGRAM(s) Oral daily  atorvastatin 20 milliGRAM(s) Oral at bedtime  ciprofloxacin     Tablet 500 milliGRAM(s) Oral every 12 hours  dextrose 5%. 1000 milliLiter(s) (50 mL/Hr) IV Continuous <Continuous>  dextrose 5%. 1000 milliLiter(s) (100 mL/Hr) IV Continuous <Continuous>  dextrose 50% Injectable 25 Gram(s) IV Push once  dextrose 50% Injectable 12.5 Gram(s) IV Push once  dextrose 50% Injectable 25 Gram(s) IV Push once  donepezil 5 milliGRAM(s) Oral at bedtime  glucagon  Injectable 1 milliGRAM(s) IntraMuscular once  insulin glargine Injectable (LANTUS) 15 Unit(s) SubCutaneous before breakfast  insulin lispro (ADMELOG) corrective regimen sliding scale   SubCutaneous three times a day before meals  insulin lispro (ADMELOG) corrective regimen sliding scale   SubCutaneous at bedtime  lactated ringers 1000 milliLiter(s) (60 mL/Hr) IV Continuous <Continuous>  lisinopril 40 milliGRAM(s) Oral daily  metoprolol succinate ER 50 milliGRAM(s) Oral daily  mirtazapine 7.5 milliGRAM(s) Oral <User Schedule>  saccharomyces boulardii 250 milliGRAM(s) Oral two times a day    MEDICATIONS  (PRN):  acetaminophen     Tablet .. 650 milliGRAM(s) Oral every 6 hours PRN Temp greater or equal to 38C (100.4F), Mild Pain (1 - 3)  dextrose Oral Gel 15 Gram(s) Oral once PRN Blood Glucose LESS THAN 70 milliGRAM(s)/deciliter  dextrose Oral Gel 15 Gram(s) Oral once PRN Blood Glucose LESS THAN 70 milliGRAM(s)/deciliter  melatonin 3 milliGRAM(s) Oral at bedtime PRN Insomnia  ondansetron Injectable 4 milliGRAM(s) IV Push every 6 hours PRN Nausea and/or Vomiting   Meds reviewed    Allergies    No Known Allergies    Intolerances         REVIEW OF SYSTEMS:    Review of Systems:   as above      ICU Vital Signs Last 24 Hrs  T(C): 36.7 (28 Apr 2025 12:01), Max: 36.9 (27 Apr 2025 21:29)  T(F): 98.1 (28 Apr 2025 12:01), Max: 98.4 (27 Apr 2025 21:29)  HR: 72 (28 Apr 2025 12:01) (72 - 95)  BP: 127/72 (28 Apr 2025 12:01) (117/72 - 155/74)  BP(mean): --  ABP: --  ABP(mean): --  RR: 18 (28 Apr 2025 12:01) (16 - 18)  SpO2: 100% (28 Apr 2025 12:01) (95% - 100%)    O2 Parameters below as of 28 Apr 2025 12:01  Patient On (Oxygen Delivery Method): room air            PHYSICAL EXAM:    GENERAL: NAD  HEAD:  Atraumatic, Normocephalic  EYES: EOMI, conjunctiva and sclera clear  ENMT: No Drainage from nares, No drainage from ears  NECK: Supple, neck  veins full  NERVOUS SYSTEM:  Awake and Alert  EXTREMITIES:  No Edema  SKIN: No rashes No obvious ecchymosis      LABS:                                   9.8    10.44 )-----------( 299      ( 28 Apr 2025 07:20 )             32.1     04-28    143  |  108  |  10  ----------------------------<  255[H]  3.9   |  29  |  0.59    Ca    8.7      28 Apr 2025 07:20  Phos  2.7     04-28  Mg     2.0     04-28        Urinalysis Basic - ( 28 Apr 2025 07:20 )    Color: x / Appearance: x / SG: x / pH: x  Gluc: 255 mg/dL / Ketone: x  / Bili: x / Urobili: x   Blood: x / Protein: x / Nitrite: x   Leuk Esterase: x / RBC: x / WBC x   Sq Epi: x / Non Sq Epi: x / Bacteria: x

## 2025-04-28 NOTE — PHYSICAL THERAPY INITIAL EVALUATION ADULT - PATIENT PROFILE REVIEW, REHAB EVAL
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

## 2025-04-28 NOTE — PROGRESS NOTE ADULT - ASSESSMENT
Hypokalemia  Cellulitis  HTN  DM  COVID  FTT    -Hypokalemia due to GI loss/low intake. No signs of renal potassium wasting   -Replace K  -Will check TTKG and FeK if potassium continue to worsen, but not necessary now  -Mg level normal  -No renal objection to dc  -K improved

## 2025-04-28 NOTE — PROGRESS NOTE ADULT - ASSESSMENT
Nausea  Decreased PO intake  COVID+  Hx DM type I    Plan:   - Suspect delayed gastric emptying  - Diet as tolerated, encourage Glucerna  - Continue trial of Reglan 5mg PO q8h x5 days  - PPI BID   - Anti-emetics PRN   - Monitor GI function   - Discussed with patient and spouse at bedside

## 2025-04-28 NOTE — CHART NOTE - NSCHARTNOTEFT_GEN_A_CORE
Message left with provider regarding d/cing po supplements 2/2 pt is on mildly thickened liquids. New order placed to replace with sugar free mighty shakes. SLP eval noted to be pending. Will remain available.

## 2025-04-28 NOTE — PROGRESS NOTE ADULT - SUBJECTIVE AND OBJECTIVE BOX
"Subjective   Patient ID: Rod Monroy is a 66 y.o. male who presents for 6 month follow up .    Here for semiannual visit  Feels well through the winter-continue to walk 45 minutes 7 days weekly despite the ice and cold winter weather.  No recent illnesses         Review of Systems    Objective   /78   Pulse 59   Ht 1.753 m (5' 9\")   Wt 77.6 kg (171 lb)   SpO2 96%   BMI 25.25 kg/m²     Physical Exam  Vitals reviewed.   Constitutional:       Appearance: Normal appearance.   HENT:      Head: Normocephalic and atraumatic.   Eyes:      General: No scleral icterus.        Right eye: No discharge.         Left eye: No discharge.      Extraocular Movements: Extraocular movements intact.      Conjunctiva/sclera: Conjunctivae normal.      Pupils: Pupils are equal, round, and reactive to light.   Cardiovascular:      Rate and Rhythm: Normal rate and regular rhythm.      Pulses: Normal pulses.      Heart sounds: Normal heart sounds. No murmur heard.  Pulmonary:      Effort: Pulmonary effort is normal.      Breath sounds: Normal breath sounds. No wheezing or rhonchi.   Musculoskeletal:         General: No deformity or signs of injury. Normal range of motion.      Cervical back: Normal range of motion and neck supple. No rigidity or tenderness.   Lymphadenopathy:      Cervical: No cervical adenopathy.   Skin:     General: Skin is warm and dry.      Findings: No rash.   Neurological:      General: No focal deficit present.      Mental Status: He is alert and oriented to person, place, and time. Mental status is at baseline.      Cranial Nerves: No cranial nerve deficit.      Sensory: No sensory deficit.      Gait: Gait normal.   Psychiatric:         Mood and Affect: Mood normal.         Behavior: Behavior normal.         Thought Content: Thought content normal.         Judgment: Judgment normal.         Assessment/Plan   Problem List Items Addressed This Visit           ICD-10-CM    White coat syndrome without " diagnosis of hypertension R03.0    Relevant Orders    Basic Metabolic Panel    Prediabetes R73.03    Relevant Orders    Basic Metabolic Panel    Vitamin D deficiency E55.9    Relevant Orders    Vitamin D 25-Hydroxy,Total (for eval of Vitamin D levels)     Other Visit Diagnoses         Codes      Screening for prostate cancer    -  Primary Z12.5    Relevant Orders    Prostate Specific Antigen      Hyperlipidemia, unspecified hyperlipidemia type     E78.5    Relevant Orders    Lipid Panel               Portions of this encounter note have been copied from my previous note dated  9/26/24 , which have been updated where appropriate and all reflect my current medical decision making from today.       Labs rev'd from 1/16/25     White coat syndrome without diagnosis of hypertension-he met with our pharmacist in 2023.  His home machine correlates.  Average blood pressure at home remains good.            Encouraged weight loss and asked him to check his blood pressure at least twice a week and call or notify me if the average systolic value is over 130            9/24-blood pressure improved            4/25-blood pressure acceptable will follow    Dyslipidemia-we'll continue to monitor lipid values. Optimal lifestyle including regular fitness and diet low in saturated fats important. Tolerating low-dose statin and atorvastatin. Goal LDL < 100            Aug '23 - LDL improved at 67              9/23-LDL 99.             4/25-annual fasting labs ordered for next time    Elevated weight-           3/24-we spoke at length.  His weight is up 9 pounds in 6 months.  Encouraged getting the lose it rachel, to track all calories for 3 weeks and then start a 12-week weight loss goal, perhaps 6 to 10 pounds in 12 weeks.  Exercise-needs to be advanced-reviewed American Heart Association's recommendations-150 exercise minutes weekly.  He is not doing half of that           9/24 - he go the Lose it rachel. He's been walking. Weight down 25 lbs  [facility]    Memo Irby NP    [address] [place] [address]  [phone]      Chief Complaint:  Patient is a 74y old  Female who presents with a chief complaint of Hyperglycemia (28 Apr 2025 09:42)      HPI/ 24 hr events:   Patient seen and examined at bedside with spouse present  Patient endorses nausea but no abdominal pain   Per spouse, patient has been sleeping more than usual   Ttolerating pureed diet so far       REVIEW OF SYSTEMS:   limited    MEDICATIONS:   MEDICATIONS  (STANDING):  amLODIPine   Tablet 10 milliGRAM(s) Oral daily  apixaban 5 milliGRAM(s) Oral every 12 hours  aspirin  chewable 81 milliGRAM(s) Oral daily  atorvastatin 20 milliGRAM(s) Oral at bedtime  ciprofloxacin     Tablet 500 milliGRAM(s) Oral every 12 hours  dextrose 5%. 1000 milliLiter(s) (100 mL/Hr) IV Continuous <Continuous>  dextrose 5%. 1000 milliLiter(s) (50 mL/Hr) IV Continuous <Continuous>  dextrose 50% Injectable 25 Gram(s) IV Push once  dextrose 50% Injectable 12.5 Gram(s) IV Push once  dextrose 50% Injectable 25 Gram(s) IV Push once  donepezil 5 milliGRAM(s) Oral at bedtime  glucagon  Injectable 1 milliGRAM(s) IntraMuscular once  insulin glargine Injectable (LANTUS) 10 Unit(s) SubCutaneous before breakfast  insulin lispro (ADMELOG) corrective regimen sliding scale   SubCutaneous three times a day before meals  insulin lispro (ADMELOG) corrective regimen sliding scale   SubCutaneous at bedtime  lisinopril 40 milliGRAM(s) Oral daily  metoclopramide 5 milliGRAM(s) Oral three times a day  metoprolol succinate ER 50 milliGRAM(s) Oral daily  mirtazapine 7.5 milliGRAM(s) Oral <User Schedule>  pantoprazole    Tablet 40 milliGRAM(s) Oral two times a day  saccharomyces boulardii 250 milliGRAM(s) Oral two times a day    MEDICATIONS  (PRN):  acetaminophen     Tablet .. 650 milliGRAM(s) Oral every 6 hours PRN Temp greater or equal to 38C (100.4F), Mild Pain (1 - 3)  dextrose Oral Gel 15 Gram(s) Oral once PRN Blood Glucose LESS THAN 70 milliGRAM(s)/deciliter  dextrose Oral Gel 15 Gram(s) Oral once PRN Blood Glucose LESS THAN 70 milliGRAM(s)/deciliter  melatonin 3 milliGRAM(s) Oral at bedtime PRN Insomnia  ondansetron Injectable 4 milliGRAM(s) IV Push every 6 hours PRN Nausea and/or Vomiting      ALLERGIES:   Allergies    No Known Allergies    Intolerances        VITAL SIGNS:   Vital Signs Last 24 Hrs  T(C): 36.8 (28 Apr 2025 06:49), Max: 36.9 (27 Apr 2025 12:39)  T(F): 98.2 (28 Apr 2025 06:49), Max: 98.4 (27 Apr 2025 12:39)  HR: 95 (28 Apr 2025 06:49) (83 - 95)  BP: 155/74 (28 Apr 2025 06:49) (117/72 - 163/80)  BP(mean): --  RR: 16 (28 Apr 2025 06:49) (16 - 18)  SpO2: 100% (28 Apr 2025 06:49) (95% - 100%)    Parameters below as of 28 Apr 2025 06:49  Patient On (Oxygen Delivery Method): room air      I&O's Summary    27 Apr 2025 07:01  -  28 Apr 2025 07:00  --------------------------------------------------------  IN: 550 mL / OUT: 500 mL / NET: 50 mL        PHYSICAL EXAM:   GENERAL:  No acute distress  HEENT:  NC/AT  CHEST:  No increased effort  HEART:  Regular rate  ABDOMEN:  Soft, non-tender, non-distended  EXTREMITIES: No cyanosis  SKIN:  Warm, dry  NEURO:  Calm, cooperative    LABS:                        9.8    10.44 )-----------( 299      ( 28 Apr 2025 07:20 )             32.1     04-28    143  |  108  |  10  ----------------------------<  255[H]  3.9   |  29  |  0.59    Ca    8.7      28 Apr 2025 07:20  Phos  2.7     04-28  Mg     2.0     04-28                                          RADIOLOGY & ADDITIONAL STUDIES:   in 6 mo!  BMI 24.5 - BMI down 3!          4/25  -BMI 25.3. He used the Lose it rachel, working to get more protein, and less carbs      Exercise routine- Currently retired. Encouraged him walking for 30 minutes daily. Reviewed American Heart Association's campaign for activity, ideally 150 minutes of exercise weekly.            He started jumping rope in summer '23.  Not walking much now.  He plans to jump rope more often.            3/24-he will advance walking routine, with a goal of 150 minutes weekly              4/25 - even through the winter, walking 45 min 7 days / wk!    Rheumatoid arthritis-he will continue his sulfasalazine and labs and visits with Dr. Domínguez as directed, every 3-4 months           4/25 - joints doing well. Jan '25 labs OK. He'll see her in Jul '25    Right foot cyst - MRI completed in Oct '20 per Dr. 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            He feels foot pain is much better since shelter, not standing for 9 hours on concrete.    Colon cancer screening/family history of colon cancer-sister-colonoscopy will continue at least every 5 years.  Updated 11/21.          -Next colonoscopy November 2026    Prostate cancer screening-he will continue PSA annually.              PSA normal Sep '24    finger cracking-he will continue using gloves working on projects as well as hand cream several times daily            To see Dr Larose in July '25    Left clav. Skin patch - To see Dr Larose in July '25    Dental exams -  Encouraged semiannually-updated Mar '25     Glasses / Vision care-he will need annual visits with his medication-as he also has glasses- at [facility].  Annual visits UTD       High dose flu shot each fall - updated 9/26/24    Covid booster - 11/23           Encouraged considering  getting the new COVID booster this fall when available.             He'll consider next fall      Prevnar 20 - Aug '22    RSV vaccination-11/23    Shingrix series -  completed May '24    Follow-up in 6 months sooner as needed    Charting was completed using voice recognition technology and may include unintended errors.

## 2025-04-28 NOTE — PROGRESS NOTE ADULT - ASSESSMENT
75yo woman with PMH of HTN and DM2, presently residing in rehab center had developed vascular ulcers/gangrene left lower extremity a and she was evaluated as outpatient by vascular surgeon scheduled for surgery today and because of glucose being out of control was sent to the emergency department here at [place] for further evaluation, treatment and management as well as stabilization of patient from a metabolic and glucose standpoint. No recent fever chills nausea vomiting or diarrhea.  She had leukocytosis of 15k in ED but no fever.   ESR 70 and CRP 36     She tested positive for COVID in ED even though no symptoms, not on o2 and CXR negative for pneumonia. Unclear when had covid but since asymptomatic can be watched.   Leg wound could be vascular with gangrene, decubitus ulcer is a possibility too.     # Vascular ulcers in LLE  # Uncontrolled Diabetes mellitus   # COVID    - Blood Cultures NGTD  - Wound culture with pseudomonas, sensitivity demonstrating resistance to zosyn  - Stopped zosyn and started on Ciprofloxacin (possibly needs 2 weeks or so)  - Monitor WBC today 10  - Vascular and wound care follow up   - MRSA PCR positive will treat with mupirocin   - For COVID will watch for now, no need for remdesivir or steroid at this time   - AC as per protocol     Will follow PRN.    Gregory Huitron MD  Division of Infectious Diseases   Please call ID service at [phone] with any question.    50 minutes spent on total encounter assessing patient, examination, chart review, counseling and coordinating care by the attending physician/nurse/care manager.

## 2025-04-28 NOTE — PROVIDER CONTACT NOTE (HYPOGLYCEMIA EVENT) - NS PROVIDER CONTACT ASSESS-HYPO
Addendum: Pt's blood sugar is now wnl at 124
pt not eating meals , only tea
Pt is a&ox1, asymptomatic

## 2025-04-28 NOTE — PROGRESS NOTE ADULT - SUBJECTIVE AND OBJECTIVE BOX
Patient is a 74y old  Female who presents with a chief complaint of Hyperglycemia (28 Apr 2025 17:26)      FROM ADMISSION H+P:   HPI:  74-year-old white female  presently residing in rehab center with history of diabetes mellitus on insulin hypertension and hyperlipidemia who had developed vascular ulcers/gangrene left lower extremity a and he was evaluated as outpatient by vascular surgeon scheduled for surgery today and because of glucose being out of control was sent to the emergency department here at [facility] for further evaluation care treatment and management as well as stabilization of patient from a metabolic and glucose standpoint.  Patient states that on a good day her sugar runs 180-200.  No recent fever chills nausea vomiting or diarrhea.  ED COURSE:  Vitals: T 97.7  F , 90 HR  ,  /47 , RR 16 , SpO2  100% on RA  Labs significant for: wbc 15.92, ,   Imaging: No acute infilitrate on chest x ray   EKG: NSR, qtc 430  Pt received: 10u admelog, zosyn 3.375 g, vanc 1g, LR 1L   (21 Apr 2025 12:55)      INTERVAL HPI/OVERNIGHT EVENTS: Patient was seen and examined. No acute events occurred overnight. No new complaints. States that she feels ok. Feeling better now. Eating more as she is not nauseous anymore.  Trevon requested PT see her as she reported back pain and said that her nausea is better. Seen by PT. This evening, patient remains well. Called and left  for son Thanh.    I&O's Summary    27 Apr 2025 07:01  -  28 Apr 2025 07:00  --------------------------------------------------------  IN: 550 mL / OUT: 500 mL / NET: 50 mL        PAST MEDICAL & SURGICAL HISTORY:  DM (diabetes mellitus)      Multiple sclerosis      DM (diabetes mellitus)      HTN (hypertension)      No significant past surgical history          LABS:                        9.8    10.44 )-----------( 299      ( 28 Apr 2025 07:20 )             32.1     04-28    143  |  108  |  10  ----------------------------<  255[H]  3.9   |  29  |  0.59    Ca    8.7      28 Apr 2025 07:20  Phos  2.7     04-28  Mg     2.0     04-28        Urinalysis Basic - ( 28 Apr 2025 07:20 )    Color: x / Appearance: x / SG: x / pH: x  Gluc: 255 mg/dL / Ketone: x  / Bili: x / Urobili: x   Blood: x / Protein: x / Nitrite: x   Leuk Esterase: x / RBC: x / WBC x   Sq Epi: x / Non Sq Epi: x / Bacteria: x      CAPILLARY BLOOD GLUCOSE      POCT Blood Glucose.: 191 mg/dL (28 Apr 2025 17:18)  POCT Blood Glucose.: 173 mg/dL (28 Apr 2025 15:29)  POCT Blood Glucose.: 165 mg/dL (28 Apr 2025 12:37)  POCT Blood Glucose.: 226 mg/dL (28 Apr 2025 08:01)  POCT Blood Glucose.: 124 mg/dL (28 Apr 2025 00:18)  POCT Blood Glucose.: 70 mg/dL (27 Apr 2025 23:37)  POCT Blood Glucose.: 75 mg/dL (27 Apr 2025 22:43)  POCT Blood Glucose.: 84 mg/dL (27 Apr 2025 22:19)  POCT Blood Glucose.: 61 mg/dL (27 Apr 2025 21:01)  POCT Blood Glucose.: 70 mg/dL (27 Apr 2025 20:52)        Urinalysis Basic - ( 28 Apr 2025 07:20 )    Color: x / Appearance: x / SG: x / pH: x  Gluc: 255 mg/dL / Ketone: x  / Bili: x / Urobili: x   Blood: x / Protein: x / Nitrite: x   Leuk Esterase: x / RBC: x / WBC x   Sq Epi: x / Non Sq Epi: x / Bacteria: x        MEDICATIONS  (STANDING):  amLODIPine   Tablet 10 milliGRAM(s) Oral daily  apixaban 5 milliGRAM(s) Oral every 12 hours  aspirin  chewable 81 milliGRAM(s) Oral daily  atorvastatin 20 milliGRAM(s) Oral at bedtime  ciprofloxacin     Tablet 500 milliGRAM(s) Oral every 12 hours  dextrose 5%. 1000 milliLiter(s) (50 mL/Hr) IV Continuous <Continuous>  dextrose 5%. 1000 milliLiter(s) (100 mL/Hr) IV Continuous <Continuous>  dextrose 50% Injectable 25 Gram(s) IV Push once  dextrose 50% Injectable 12.5 Gram(s) IV Push once  dextrose 50% Injectable 25 Gram(s) IV Push once  donepezil 5 milliGRAM(s) Oral at bedtime  glucagon  Injectable 1 milliGRAM(s) IntraMuscular once  insulin glargine Injectable (LANTUS) 15 Unit(s) SubCutaneous before breakfast  insulin lispro (ADMELOG) corrective regimen sliding scale   SubCutaneous three times a day before meals  insulin lispro (ADMELOG) corrective regimen sliding scale   SubCutaneous at bedtime  lisinopril 40 milliGRAM(s) Oral daily  metoclopramide 5 milliGRAM(s) Oral three times a day  metoprolol succinate ER 50 milliGRAM(s) Oral daily  mirtazapine 7.5 milliGRAM(s) Oral <User Schedule>  pantoprazole    Tablet 40 milliGRAM(s) Oral two times a day  saccharomyces boulardii 250 milliGRAM(s) Oral two times a day    MEDICATIONS  (PRN):  acetaminophen     Tablet .. 650 milliGRAM(s) Oral every 6 hours PRN Temp greater or equal to 38C (100.4F), Mild Pain (1 - 3)  dextrose Oral Gel 15 Gram(s) Oral once PRN Blood Glucose LESS THAN 70 milliGRAM(s)/deciliter  dextrose Oral Gel 15 Gram(s) Oral once PRN Blood Glucose LESS THAN 70 milliGRAM(s)/deciliter  melatonin 3 milliGRAM(s) Oral at bedtime PRN Insomnia  ondansetron Injectable 4 milliGRAM(s) IV Push every 6 hours PRN Nausea and/or Vomiting      REVIEW OF SYSTEMS:  feels weak but denies all complaints - no nausea - no back pain as  had reported earlier    RADIOLOGY & ADDITIONAL TESTS:    Imaging Personally Reviewed:  [x] YES  [ ] NO    Consultant(s) Notes Reviewed:  [x] YES  [ ] NO    PHYSICAL EXAM:  T(C): 36.7 (04-28-25 @ 12:01), Max: 36.9 (04-27-25 @ 21:29)  HR: 75 (04-28-25 @ 17:30) (72 - 95)  BP: 145/72 (04-28-25 @ 17:30) (117/72 - 155/74)  RR: 18 (04-28-25 @ 12:01) (16 - 18)  SpO2: 98% (04-28-25 @ 17:30) (95% - 100%)    GENERAL: NAD  HEENT:  anicteric, moist mucous membranes  CHEST/LUNG:  CTA b/l, no rales, wheezes, or rhonchi  HEART:  RRR, S1, S2  ABDOMEN:  BS+, soft, nontender, nondistended  EXTREMITIES: LLE ulcers  NERVOUS SYSTEM: answers questions and follows commands  PSYCH: flat affect    Care Discussed with Consultants/Other Providers [x] YES  [ ] NO

## 2025-04-28 NOTE — PROGRESS NOTE ADULT - SUBJECTIVE AND OBJECTIVE BOX
CAPILLARY BLOOD GLUCOSE      POCT Blood Glucose.: 124 mg/dL (28 Apr 2025 00:18)  POCT Blood Glucose.: 70 mg/dL (27 Apr 2025 23:37)  POCT Blood Glucose.: 75 mg/dL (27 Apr 2025 22:43)  POCT Blood Glucose.: 84 mg/dL (27 Apr 2025 22:19)  POCT Blood Glucose.: 61 mg/dL (27 Apr 2025 21:01)  POCT Blood Glucose.: 70 mg/dL (27 Apr 2025 20:52)  POCT Blood Glucose.: 158 mg/dL (27 Apr 2025 18:06)  POCT Blood Glucose.: 64 mg/dL (27 Apr 2025 17:36)  POCT Blood Glucose.: 66 mg/dL (27 Apr 2025 17:19)  POCT Blood Glucose.: 131 mg/dL (27 Apr 2025 12:22)  POCT Blood Glucose.: 204 mg/dL (27 Apr 2025 08:30)      Vital Signs Last 24 Hrs  T(C): 36.8 (28 Apr 2025 06:49), Max: 36.9 (27 Apr 2025 12:39)  T(F): 98.2 (28 Apr 2025 06:49), Max: 98.4 (27 Apr 2025 12:39)  HR: 95 (28 Apr 2025 06:49) (83 - 95)  BP: 155/74 (28 Apr 2025 06:49) (117/72 - 163/80)  BP(mean): --  RR: 16 (28 Apr 2025 06:49) (16 - 18)  SpO2: 100% (28 Apr 2025 06:49) (95% - 100%)    Parameters below as of 28 Apr 2025 06:49  Patient On (Oxygen Delivery Method): room air        General: WN/WD NAD  Respiratory: CTA B/L  CV: RRR, S1S2, no murmurs, rubs or gallops  Abdominal: Soft, NT, ND +BS, Last BM  Extremities: No edema, + peripheral pulses     04-27    143  |  112[H]  |  12  ----------------------------<  218[H]  5.1   |  27  |  0.57    Ca    9.0      27 Apr 2025 08:50  Phos  2.9     04-27  Mg     2.0     04-27    TPro  6.8  /  Alb  2.3[L]  /  TBili  0.3  /  DBili  x   /  AST  18  /  ALT  15  /  AlkPhos  125[H]  04-26      atorvastatin 20 milliGRAM(s) Oral at bedtime  dextrose 50% Injectable 25 Gram(s) IV Push once  dextrose 50% Injectable 12.5 Gram(s) IV Push once  dextrose 50% Injectable 25 Gram(s) IV Push once  dextrose Oral Gel 15 Gram(s) Oral once PRN  dextrose Oral Gel 15 Gram(s) Oral once PRN  glucagon  Injectable 1 milliGRAM(s) IntraMuscular once  insulin glargine Injectable (LANTUS) 10 Unit(s) SubCutaneous before breakfast  insulin lispro (ADMELOG) corrective regimen sliding scale   SubCutaneous three times a day before meals  insulin lispro (ADMELOG) corrective regimen sliding scale   SubCutaneous at bedtime

## 2025-04-28 NOTE — PHYSICAL THERAPY INITIAL EVALUATION ADULT - RANGE OF MOTION EXAMINATION, REHAB EVAL
right lower unable to do hip and knee flexion , increase tone , ankle arom limited , left lower hip and knee flexion0-20 degreee and ankle dl.f 0-5 ./Left UE ROM was WNL (within normal limits)/Right UE ROM was WNL (within normal limits)

## 2025-04-28 NOTE — PROGRESS NOTE ADULT - SUBJECTIVE AND OBJECTIVE BOX
[facility]   INFECTIOUS DISEASES   [address] [place] [address]  Tel: [phone]     Fax: [phone]  =========================================================  MD Blayne Bell Kaushal, MD Cho, Michelle, MD Sunjit, Jaspal, MD  =========================================================    [facility] [id number]    Follow up: Left lower leg ulcers. No new changes. NAD lying in bed, not on o2.   No fever     Allergies:  No Known Allergies    MEDICATIONS  (STANDING):  amLODIPine   Tablet 10 milliGRAM(s) Oral daily  apixaban 5 milliGRAM(s) Oral every 12 hours  aspirin  chewable 81 milliGRAM(s) Oral daily  atorvastatin 20 milliGRAM(s) Oral at bedtime  ciprofloxacin     Tablet 500 milliGRAM(s) Oral every 12 hours  dextrose 5%. 1000 milliLiter(s) (100 mL/Hr) IV Continuous <Continuous>  dextrose 5%. 1000 milliLiter(s) (50 mL/Hr) IV Continuous <Continuous>  dextrose 50% Injectable 25 Gram(s) IV Push once  dextrose 50% Injectable 12.5 Gram(s) IV Push once  dextrose 50% Injectable 25 Gram(s) IV Push once  donepezil 5 milliGRAM(s) Oral at bedtime  glucagon  Injectable 1 milliGRAM(s) IntraMuscular once  insulin glargine Injectable (LANTUS) 10 Unit(s) SubCutaneous before breakfast  insulin lispro (ADMELOG) corrective regimen sliding scale   SubCutaneous three times a day before meals  insulin lispro (ADMELOG) corrective regimen sliding scale   SubCutaneous at bedtime  lisinopril 40 milliGRAM(s) Oral daily  metoclopramide 5 milliGRAM(s) Oral three times a day  metoprolol succinate ER 50 milliGRAM(s) Oral daily  mirtazapine 7.5 milliGRAM(s) Oral <User Schedule>  pantoprazole    Tablet 40 milliGRAM(s) Oral two times a day  saccharomyces boulardii 250 milliGRAM(s) Oral two times a day     REVIEW OF SYSTEMS:  REVIEW OF SYSTEMS:  CONSTITUTIONAL:  No Fever or chills  HEENT:   No diplopia or blurred vision.  No earache, sore throat   CARDIOVASCULAR:  No chest pain or SOB  RESPIRATORY: + cough, no shortness of breath, PND or orthopnea.  GASTROINTESTINAL:  No nausea, vomiting or diarrhea.  GENITOURINARY:  No dysuria, frequency or urgency. No Blood in urine  SKIN:  leg wound     Physical Exam:  Vital Signs Last 24 Hrs  T(C): 36.7 (28 Apr 2025 12:01), Max: 36.9 (27 Apr 2025 21:29)  T(F): 98.1 (28 Apr 2025 12:01), Max: 98.4 (27 Apr 2025 21:29)  HR: 72 (28 Apr 2025 12:01) (72 - 95)  BP: 127/72 (28 Apr 2025 12:01) (117/72 - 155/74)  BP(mean): --  RR: 18 (28 Apr 2025 12:01) (16 - 18)  SpO2: 100% (28 Apr 2025 12:01) (95% - 100%)  Parameters below as of 28 Apr 2025 12:01  Patient On (Oxygen Delivery Method): room air  GEN: NAD  HEENT: normocephalic and atraumatic. EOMI. PERRL.    NECK: Supple.  No lymphadenopathy   LUNGS: Clear to auscultation.  HEART: Regular rate and rhythm   ABDOMEN: Soft, nontender, and nondistended.   EXTREMITIES: Without edema.  NEUROLOGIC: grossly intact.  PSYCHIATRIC: Appropriate affect .  SKIN: LLE with 2 areas of gangrenous ulcers, one on ankle on dorsal side and   larger one in posterolateral side of leg, some discharge under dry eschar     Labs:                        9.8    10.44 )-----------( 299      ( 28 Apr 2025 07:20 )             32.1     04-28    143  |  108  |  10  ----------------------------<  255[H]  3.9   |  29  |  0.59    Ca    8.7      28 Apr 2025 07:20  Phos  2.7     04-28  Mg     2.0     04-28    Culture - Urine (collected 04-22-25 @ 03:30)  Source: Catheterized Catheterized  Final Report (04-24-25 @ 12:37):    10,000 - 49,000 CFU/mL Candida tropicalis "Susceptibilities not performed"    Culture - Wound Aerobic/Anaerobic (collected 04-21-25 @ 15:00)  Source: Swab Swab  Final Report (04-26-25 @ 15:27):    Moderate Pseudomonas aeruginosa  Organism: Pseudomonas aeruginosa (04-26-25 @ 15:27)  Organism: Pseudomonas aeruginosa (04-26-25 @ 15:27)    Sensitivities:      -  Levofloxacin: S <=0.5      -  Aztreonam: R >16      -  Cefepime: R >16      -  Piperacillin/Tazobactam: R >64      -  Ciprofloxacin: S <=0.25      -  Imipenem: S <=1      Method Type: HIMA      -  Meropenem: S <=1      -  Ceftazidime: R >16    Culture - Blood (collected 04-21-25 @ 09:15)  Source: Blood Blood-Peripheral  Final Report (04-26-25 @ 16:00):    No growth at 5 days    Culture - Blood (collected 04-21-25 @ 09:10)  Source: Blood Blood-Peripheral  Final Report (04-26-25 @ 16:00):    No growth at 5 days    WBC Count: 10.44 K/uL (04-28-25 @ 07:20)  WBC Count: 9.98 K/uL (04-27-25 @ 08:50)  WBC Count: 8.53 K/uL (04-26-25 @ 09:19)  WBC Count: 10.59 K/uL (04-25-25 @ 08:25)  WBC Count: 12.61 K/uL (04-24-25 @ 07:48)    Creatinine: 0.59 mg/dL (04-28-25 @ 07:20)  Creatinine: 0.57 mg/dL (04-27-25 @ 08:50)  Creatinine: 0.54 mg/dL (04-26-25 @ 09:19)  Creatinine: 0.47 mg/dL (04-25-25 @ 08:25)  Creatinine: 0.51 mg/dL (04-24-25 @ 07:48)  Creatinine: 0.67 mg/dL (04-23-25 @ 16:40)    C-Reactive Protein: 36 mg/L (04-21-25 @ 09:15)    Sedimentation Rate, Erythrocyte: 65 mm/hr (04-23-25 @ 06:45)  Sedimentation Rate, Erythrocyte: 70 mm/hr (04-21-25 @ 09:15)     COVID-19 PCR: Detected (04-21-25 @ 23:50)    All imaging and data are reviewed.   < from: [facility] Duplex Lower Ext Vein Scan, [place] (04.21.25 @ 14:14) >  IMPRESSION:  No evidence of deep venous thrombosis in the visualized deep veins of   either lower extremity noted above.

## 2025-04-28 NOTE — PHYSICAL THERAPY INITIAL EVALUATION ADULT - IMPAIRMENTS CONTRIBUTING IMPAIRED BED MOBILITY, REHAB EVAL
impaired balance/impaired coordination/decreased flexibility/impaired motor control/abnormal muscle tone/decreased ROM/decreased strength

## 2025-04-29 LAB
ANION GAP SERPL CALC-SCNC: 8 MMOL/L — SIGNIFICANT CHANGE UP (ref 5–17)
BUN SERPL-MCNC: 11 MG/DL — SIGNIFICANT CHANGE UP (ref 7–23)
CALCIUM SERPL-MCNC: 8.4 MG/DL — LOW (ref 8.5–10.1)
CHLORIDE SERPL-SCNC: 111 MMOL/L — HIGH (ref 96–108)
CO2 SERPL-SCNC: 26 MMOL/L — SIGNIFICANT CHANGE UP (ref 22–31)
CREAT SERPL-MCNC: 0.45 MG/DL — LOW (ref 0.5–1.3)
EGFR: 100 ML/MIN/1.73M2 — SIGNIFICANT CHANGE UP
EGFR: 100 ML/MIN/1.73M2 — SIGNIFICANT CHANGE UP
GLUCOSE SERPL-MCNC: 122 MG/DL — HIGH (ref 70–99)
HCT VFR BLD CALC: 29.6 % — LOW (ref 34.5–45)
HGB BLD-MCNC: 9.8 G/DL — LOW (ref 11.5–15.5)
MAGNESIUM SERPL-MCNC: 1.9 MG/DL — SIGNIFICANT CHANGE UP (ref 1.6–2.6)
MCHC RBC-ENTMCNC: 30.7 PG — SIGNIFICANT CHANGE UP (ref 27–34)
MCHC RBC-ENTMCNC: 33.1 G/DL — SIGNIFICANT CHANGE UP (ref 32–36)
MCV RBC AUTO: 92.8 FL — SIGNIFICANT CHANGE UP (ref 80–100)
NRBC BLD AUTO-RTO: 0 /100 WBCS — SIGNIFICANT CHANGE UP (ref 0–0)
PLATELET # BLD AUTO: 341 K/UL — SIGNIFICANT CHANGE UP (ref 150–400)
POTASSIUM SERPL-MCNC: 3.7 MMOL/L — SIGNIFICANT CHANGE UP (ref 3.5–5.3)
POTASSIUM SERPL-SCNC: 3.7 MMOL/L — SIGNIFICANT CHANGE UP (ref 3.5–5.3)
RBC # BLD: 3.19 M/UL — LOW (ref 3.8–5.2)
RBC # FLD: 14.6 % — HIGH (ref 10.3–14.5)
SODIUM SERPL-SCNC: 145 MMOL/L — SIGNIFICANT CHANGE UP (ref 135–145)
WBC # BLD: 10.44 K/UL — SIGNIFICANT CHANGE UP (ref 3.8–10.5)
WBC # FLD AUTO: 10.44 K/UL — SIGNIFICANT CHANGE UP (ref 3.8–10.5)

## 2025-04-29 PROCEDURE — 99232 SBSQ HOSP IP/OBS MODERATE 35: CPT

## 2025-04-29 PROCEDURE — 99233 SBSQ HOSP IP/OBS HIGH 50: CPT

## 2025-04-29 PROCEDURE — G0545: CPT

## 2025-04-29 RX ORDER — METOPROLOL SUCCINATE 50 MG/1
25 TABLET, EXTENDED RELEASE ORAL
Refills: 0 | Status: DISCONTINUED | OUTPATIENT
Start: 2025-04-30 | End: 2025-05-01

## 2025-04-29 RX ORDER — POLYETHYLENE GLYCOL 3350 17 G/17G
17 POWDER, FOR SOLUTION ORAL DAILY
Refills: 0 | Status: DISCONTINUED | OUTPATIENT
Start: 2025-04-29 | End: 2025-04-30

## 2025-04-29 RX ORDER — METOPROLOL SUCCINATE 50 MG/1
25 TABLET, EXTENDED RELEASE ORAL
Refills: 0 | Status: COMPLETED | OUTPATIENT
Start: 2025-04-29 | End: 2025-04-29

## 2025-04-29 RX ORDER — LACTOBACILLUS ACIDOPHILUS/PECT 75 MM-100
1 CAPSULE ORAL
Refills: 0 | Status: DISCONTINUED | OUTPATIENT
Start: 2025-04-29 | End: 2025-04-30

## 2025-04-29 RX ORDER — METOPROLOL SUCCINATE 50 MG/1
50 TABLET, EXTENDED RELEASE ORAL
Refills: 0 | Status: DISCONTINUED | OUTPATIENT
Start: 2025-04-29 | End: 2025-04-29

## 2025-04-29 RX ADMIN — Medication 1 TABLET(S): at 12:52

## 2025-04-29 RX ADMIN — Medication 81 MILLIGRAM(S): at 12:52

## 2025-04-29 RX ADMIN — Medication 5 MILLIGRAM(S): at 21:41

## 2025-04-29 RX ADMIN — Medication 5 MILLIGRAM(S): at 12:52

## 2025-04-29 RX ADMIN — DONEPEZIL HYDROCHLORIDE 5 MILLIGRAM(S): 5 TABLET ORAL at 21:41

## 2025-04-29 RX ADMIN — METOPROLOL SUCCINATE 25 MILLIGRAM(S): 50 TABLET, EXTENDED RELEASE ORAL at 17:49

## 2025-04-29 RX ADMIN — Medication 1 TABLET(S): at 17:49

## 2025-04-29 RX ADMIN — Medication 500 MILLIGRAM(S): at 06:50

## 2025-04-29 RX ADMIN — POLYETHYLENE GLYCOL 3350 17 GRAM(S): 17 POWDER, FOR SOLUTION ORAL at 12:51

## 2025-04-29 RX ADMIN — Medication 1 TABLET(S): at 09:05

## 2025-04-29 RX ADMIN — LISINOPRIL 40 MILLIGRAM(S): 5 TABLET ORAL at 06:50

## 2025-04-29 RX ADMIN — AMLODIPINE BESYLATE 10 MILLIGRAM(S): 10 TABLET ORAL at 06:49

## 2025-04-29 RX ADMIN — APIXABAN 5 MILLIGRAM(S): 2.5 TABLET, FILM COATED ORAL at 17:49

## 2025-04-29 RX ADMIN — MIRTAZAPINE 7.5 MILLIGRAM(S): 30 TABLET, FILM COATED ORAL at 18:33

## 2025-04-29 RX ADMIN — METOPROLOL SUCCINATE 25 MILLIGRAM(S): 50 TABLET, EXTENDED RELEASE ORAL at 09:28

## 2025-04-29 RX ADMIN — Medication 5 MILLIGRAM(S): at 06:50

## 2025-04-29 RX ADMIN — INSULIN LISPRO 2: 100 INJECTION, SOLUTION INTRAVENOUS; SUBCUTANEOUS at 12:51

## 2025-04-29 RX ADMIN — APIXABAN 5 MILLIGRAM(S): 2.5 TABLET, FILM COATED ORAL at 06:49

## 2025-04-29 RX ADMIN — ATORVASTATIN CALCIUM 20 MILLIGRAM(S): 80 TABLET, FILM COATED ORAL at 21:41

## 2025-04-29 RX ADMIN — Medication 500 MILLIGRAM(S): at 17:49

## 2025-04-29 RX ADMIN — INSULIN GLARGINE-YFGN 12 UNIT(S): 100 INJECTION, SOLUTION SUBCUTANEOUS at 09:05

## 2025-04-29 RX ADMIN — INSULIN LISPRO 1: 100 INJECTION, SOLUTION INTRAVENOUS; SUBCUTANEOUS at 17:48

## 2025-04-29 NOTE — PROGRESS NOTE ADULT - SUBJECTIVE AND OBJECTIVE BOX
Patient is a 74y old  Female who presents with a chief complaint of Hyperglycemia (29 Apr 2025 15:36)      FROM ADMISSION H+P:   HPI:  74-year-old white female  presently residing in rehab center with history of diabetes mellitus on insulin hypertension and hyperlipidemia who had developed vascular ulcers/gangrene left lower extremity a and he was evaluated as outpatient by vascular surgeon scheduled for surgery today and because of glucose being out of control was sent to the emergency department here at [facility] for further evaluation care treatment and management as well as stabilization of patient from a metabolic and glucose standpoint.  Patient states that on a good day her sugar runs 180-200.  No recent fever chills nausea vomiting or diarrhea.  ED COURSE:  Vitals: T 97.7  F , 90 HR  ,  /47 , RR 16 , SpO2  100% on RA  Labs significant for: wbc 15.92, ,   Imaging: No acute infilitrate on chest x ray   EKG: NSR, qtc 430  Pt received: 10u admelog, zosyn 3.375 g, vanc 1g, LR 1L   (21 Apr 2025 12:55)    INTERVAL HPI/OVERNIGHT EVENTS: Patient was seen and examined. No acute events occurred overnight. No new complaints. feels good to go    I&O's Summary    28 Apr 2025 07:01  -  29 Apr 2025 07:00  --------------------------------------------------------  IN: 120 mL / OUT: 400 mL / NET: -280 mL        PAST MEDICAL & SURGICAL HISTORY:  DM (diabetes mellitus)      Multiple sclerosis      DM (diabetes mellitus)      HTN (hypertension)      No significant past surgical history          LABS:                        9.8    10.44 )-----------( 341      ( 29 Apr 2025 07:18 )             29.6     04-29    145  |  111[H]  |  11  ----------------------------<  122[H]  3.7   |  26  |  0.45[L]    Ca    8.4[L]      29 Apr 2025 07:18  Phos  2.7     04-28  Mg     1.9     04-29        Urinalysis Basic - ( 29 Apr 2025 07:18 )    Color: x / Appearance: x / SG: x / pH: x  Gluc: 122 mg/dL / Ketone: x  / Bili: x / Urobili: x   Blood: x / Protein: x / Nitrite: x   Leuk Esterase: x / RBC: x / WBC x   Sq Epi: x / Non Sq Epi: x / Bacteria: x      CAPILLARY BLOOD GLUCOSE      POCT Blood Glucose.: 173 mg/dL (29 Apr 2025 17:05)  POCT Blood Glucose.: 215 mg/dL (29 Apr 2025 11:59)  POCT Blood Glucose.: 134 mg/dL (29 Apr 2025 08:41)  POCT Blood Glucose.: 102 mg/dL (28 Apr 2025 21:12)        Urinalysis Basic - ( 29 Apr 2025 07:18 )    Color: x / Appearance: x / SG: x / pH: x  Gluc: 122 mg/dL / Ketone: x  / Bili: x / Urobili: x   Blood: x / Protein: x / Nitrite: x   Leuk Esterase: x / RBC: x / WBC x   Sq Epi: x / Non Sq Epi: x / Bacteria: x        MEDICATIONS  (STANDING):  amLODIPine   Tablet 10 milliGRAM(s) Oral daily  apixaban 5 milliGRAM(s) Oral every 12 hours  aspirin  chewable 81 milliGRAM(s) Oral daily  atorvastatin 20 milliGRAM(s) Oral at bedtime  ciprofloxacin     Tablet 500 milliGRAM(s) Oral every 12 hours  dextrose 5%. 1000 milliLiter(s) (50 mL/Hr) IV Continuous <Continuous>  dextrose 5%. 1000 milliLiter(s) (100 mL/Hr) IV Continuous <Continuous>  dextrose 50% Injectable 25 Gram(s) IV Push once  dextrose 50% Injectable 12.5 Gram(s) IV Push once  dextrose 50% Injectable 25 Gram(s) IV Push once  donepezil 5 milliGRAM(s) Oral at bedtime  glucagon  Injectable 1 milliGRAM(s) IntraMuscular once  insulin glargine Injectable (LANTUS) 12 Unit(s) SubCutaneous before breakfast  insulin lispro (ADMELOG) corrective regimen sliding scale   SubCutaneous three times a day before meals  insulin lispro (ADMELOG) corrective regimen sliding scale   SubCutaneous at bedtime  lactobacillus acidophilus 1 Tablet(s) Oral three times a day with meals  lisinopril 40 milliGRAM(s) Oral daily  metoclopramide 5 milliGRAM(s) Oral three times a day  metoprolol tartrate 25 milliGRAM(s) Oral two times a day  mirtazapine 7.5 milliGRAM(s) Oral <User Schedule>  pantoprazole    Tablet 40 milliGRAM(s) Oral two times a day  polyethylene glycol 3350 17 Gram(s) Oral daily    MEDICATIONS  (PRN):  acetaminophen     Tablet .. 650 milliGRAM(s) Oral every 6 hours PRN Temp greater or equal to 38C (100.4F), Mild Pain (1 - 3)  dextrose Oral Gel 15 Gram(s) Oral once PRN Blood Glucose LESS THAN 70 milliGRAM(s)/deciliter  dextrose Oral Gel 15 Gram(s) Oral once PRN Blood Glucose LESS THAN 70 milliGRAM(s)/deciliter  melatonin 3 milliGRAM(s) Oral at bedtime PRN Insomnia  ondansetron Injectable 4 milliGRAM(s) IV Push every 6 hours PRN Nausea and/or Vomiting      REVIEW OF SYSTEMS:  CONSTITUTIONAL: No fever or chills  HEENT:  No headache, no sore throat  RESPIRATORY: No cough, wheezing, or shortness of breath  CARDIOVASCULAR: No chest pain, palpitations  GASTROINTESTINAL: No abdominal pain, nausea, vomiting, or diarrhea  GENITOURINARY: No dysuria, frequency, or hematuria  NEUROLOGICAL: no focal weakness or dizziness  MUSCULOSKELETAL: no myalgias  PSYCH: no recent changes in mood    RADIOLOGY & ADDITIONAL TESTS:    Imaging Personally Reviewed:  [x] YES  [ ] NO    Consultant(s) Notes Reviewed:  [x] YES  [ ] NO    PHYSICAL EXAM:  T(C): 36.6 (04-29-25 @ 13:44), Max: 36.7 (04-28-25 @ 19:59)  HR: 66 (04-29-25 @ 13:44) (66 - 91)  BP: 127/67 (04-29-25 @ 13:44) (127/67 - 155/78)  RR: 17 (04-29-25 @ 13:44) (17 - 18)  SpO2: 100% (04-29-25 @ 13:44) (98% - 100%)    GENERAL: NAD, well-developed, well-groomed  HEENT:  anicteric, moist mucous membranes  CHEST/LUNG:  CTA b/l, no rales, wheezes, or rhonchi  HEART:  RRR, S1, S2  ABDOMEN:  BS+, soft, nontender, nondistended  EXTREMITIES: no edema, cyanosis, or calf tenderness  NERVOUS SYSTEM: answers questions and follows commands appropriately  PSYCH: normal affect    Care Discussed with Consultants/Other Providers [x] YES  [ ] NO Patient is a 74y old  Female who presents with a chief complaint of Hyperglycemia (29 Apr 2025 15:36)      FROM ADMISSION H+P:   HPI:  74-year-old white female  presently residing in rehab center with history of diabetes mellitus on insulin hypertension and hyperlipidemia who had developed vascular ulcers/gangrene left lower extremity a and he was evaluated as outpatient by vascular surgeon scheduled for surgery today and because of glucose being out of control was sent to the emergency department here at [facility] for further evaluation care treatment and management as well as stabilization of patient from a metabolic and glucose standpoint.  Patient states that on a good day her sugar runs 180-200.  No recent fever chills nausea vomiting or diarrhea.  ED COURSE:  Vitals: T 97.7  F , 90 HR  ,  /47 , RR 16 , SpO2  100% on RA  Labs significant for: wbc 15.92, ,   Imaging: No acute infilitrate on chest x ray   EKG: NSR, qtc 430  Pt received: 10u admelog, zosyn 3.375 g, vanc 1g, LR 1L   (21 Apr 2025 12:55)    INTERVAL HPI/OVERNIGHT EVENTS: Patient was seen and examined. No acute events occurred overnight. No new complaints. States "i'm good to go". No nausea.    Son Thanh called for update - planning for    I&O's Summary    28 Apr 2025 07:01  -  29 Apr 2025 07:00  --------------------------------------------------------  IN: 120 mL / OUT: 400 mL / NET: -280 mL        PAST MEDICAL & SURGICAL HISTORY:  DM (diabetes mellitus)      Multiple sclerosis      DM (diabetes mellitus)      HTN (hypertension)      No significant past surgical history          LABS:                        9.8    10.44 )-----------( 341      ( 29 Apr 2025 07:18 )             29.6     04-29    145  |  111[H]  |  11  ----------------------------<  122[H]  3.7   |  26  |  0.45[L]    Ca    8.4[L]      29 Apr 2025 07:18  Phos  2.7     04-28  Mg     1.9     04-29        Urinalysis Basic - ( 29 Apr 2025 07:18 )    Color: x / Appearance: x / SG: x / pH: x  Gluc: 122 mg/dL / Ketone: x  / Bili: x / Urobili: x   Blood: x / Protein: x / Nitrite: x   Leuk Esterase: x / RBC: x / WBC x   Sq Epi: x / Non Sq Epi: x / Bacteria: x      CAPILLARY BLOOD GLUCOSE      POCT Blood Glucose.: 173 mg/dL (29 Apr 2025 17:05)  POCT Blood Glucose.: 215 mg/dL (29 Apr 2025 11:59)  POCT Blood Glucose.: 134 mg/dL (29 Apr 2025 08:41)  POCT Blood Glucose.: 102 mg/dL (28 Apr 2025 21:12)        Urinalysis Basic - ( 29 Apr 2025 07:18 )    Color: x / Appearance: x / SG: x / pH: x  Gluc: 122 mg/dL / Ketone: x  / Bili: x / Urobili: x   Blood: x / Protein: x / Nitrite: x   Leuk Esterase: x / RBC: x / WBC x   Sq Epi: x / Non Sq Epi: x / Bacteria: x        MEDICATIONS  (STANDING):  amLODIPine   Tablet 10 milliGRAM(s) Oral daily  apixaban 5 milliGRAM(s) Oral every 12 hours  aspirin  chewable 81 milliGRAM(s) Oral daily  atorvastatin 20 milliGRAM(s) Oral at bedtime  ciprofloxacin     Tablet 500 milliGRAM(s) Oral every 12 hours  dextrose 5%. 1000 milliLiter(s) (50 mL/Hr) IV Continuous <Continuous>  dextrose 5%. 1000 milliLiter(s) (100 mL/Hr) IV Continuous <Continuous>  dextrose 50% Injectable 25 Gram(s) IV Push once  dextrose 50% Injectable 12.5 Gram(s) IV Push once  dextrose 50% Injectable 25 Gram(s) IV Push once  donepezil 5 milliGRAM(s) Oral at bedtime  glucagon  Injectable 1 milliGRAM(s) IntraMuscular once  insulin glargine Injectable (LANTUS) 12 Unit(s) SubCutaneous before breakfast  insulin lispro (ADMELOG) corrective regimen sliding scale   SubCutaneous three times a day before meals  insulin lispro (ADMELOG) corrective regimen sliding scale   SubCutaneous at bedtime  lactobacillus acidophilus 1 Tablet(s) Oral three times a day with meals  lisinopril 40 milliGRAM(s) Oral daily  metoclopramide 5 milliGRAM(s) Oral three times a day  metoprolol tartrate 25 milliGRAM(s) Oral two times a day  mirtazapine 7.5 milliGRAM(s) Oral <User Schedule>  pantoprazole    Tablet 40 milliGRAM(s) Oral two times a day  polyethylene glycol 3350 17 Gram(s) Oral daily    MEDICATIONS  (PRN):  acetaminophen     Tablet .. 650 milliGRAM(s) Oral every 6 hours PRN Temp greater or equal to 38C (100.4F), Mild Pain (1 - 3)  dextrose Oral Gel 15 Gram(s) Oral once PRN Blood Glucose LESS THAN 70 milliGRAM(s)/deciliter  dextrose Oral Gel 15 Gram(s) Oral once PRN Blood Glucose LESS THAN 70 milliGRAM(s)/deciliter  melatonin 3 milliGRAM(s) Oral at bedtime PRN Insomnia  ondansetron Injectable 4 milliGRAM(s) IV Push every 6 hours PRN Nausea and/or Vomiting      REVIEW OF SYSTEMS:  unable to obtain due to mental status  reports no nausea    RADIOLOGY & ADDITIONAL TESTS:    Imaging Personally Reviewed:  [x] YES  [ ] NO    Consultant(s) Notes Reviewed:  [x] YES  [ ] NO    PHYSICAL EXAM:  T(C): 36.6 (04-29-25 @ 13:44), Max: 36.7 (04-28-25 @ 19:59)  HR: 66 (04-29-25 @ 13:44) (66 - 91)  BP: 127/67 (04-29-25 @ 13:44) (127/67 - 155/78)  RR: 17 (04-29-25 @ 13:44) (17 - 18)  SpO2: 100% (04-29-25 @ 13:44) (98% - 100%)    GENERAL: elderly female, frail  HEENT:  anicteric, moist mucous membranes  CHEST/LUNG:  CTA b/l, no rales, wheezes, or rhonchi  HEART:  RRR, S1, S2  ABDOMEN:  BS+, soft, nontender, nondistended  EXTREMITIES: LLE wounds unchanged  NERVOUS SYSTEM: answers questions and follows commands appropriately  PSYCH: flat affect    Care Discussed with Consultants/Other Providers [x] YES  [ ] NO

## 2025-04-29 NOTE — PROVIDER CONTACT NOTE (OTHER) - ACTION/TREATMENT ORDERED:
Dr. Borja notified on pt status. Ordered to obtain new set of vitals 30 minutes post medication intervention and call back with results. NNO at this time.
MD made aware
Dr. Borja made aware; awaiting for new order.
Dr. Borja to place an order for isolation precautions.
Md Borja notified and made aware. MD to look into meds and will get back to RN.

## 2025-04-29 NOTE — PROVIDER CONTACT NOTE (OTHER) - BACKGROUND
Admit for LLE angiogram, unable to proceed w/ procedure d/y hyperglycemia Admit for LLE angiogram, unable to proceed w/ procedure d/t hyperglycemia

## 2025-04-29 NOTE — PROGRESS NOTE ADULT - SUBJECTIVE AND OBJECTIVE BOX
[facility] -- Chikis Sanders Pannella, Patel, Savella, Goodger, Cohen: Office # [phone]    Follow Up:  Afib     Subjective/Observations: No events overnight resting comfortably in bed.  No complaints of chest pain, dyspnea, or palpitations reported. No signs of orthopnea or PND.     REVIEW OF SYSTEMS: All other review of systems are negative unless indicated above    PAST MEDICAL & SURGICAL HISTORY:  DM (diabetes mellitus)      Multiple sclerosis      DM (diabetes mellitus)      HTN (hypertension)      No significant past surgical history          MEDICATIONS  (STANDING):  amLODIPine   Tablet 10 milliGRAM(s) Oral daily  apixaban 5 milliGRAM(s) Oral every 12 hours  aspirin  chewable 81 milliGRAM(s) Oral daily  atorvastatin 20 milliGRAM(s) Oral at bedtime  ciprofloxacin     Tablet 500 milliGRAM(s) Oral every 12 hours  dextrose 5%. 1000 milliLiter(s) (50 mL/Hr) IV Continuous <Continuous>  dextrose 5%. 1000 milliLiter(s) (100 mL/Hr) IV Continuous <Continuous>  dextrose 50% Injectable 25 Gram(s) IV Push once  dextrose 50% Injectable 12.5 Gram(s) IV Push once  dextrose 50% Injectable 25 Gram(s) IV Push once  donepezil 5 milliGRAM(s) Oral at bedtime  glucagon  Injectable 1 milliGRAM(s) IntraMuscular once  insulin glargine Injectable (LANTUS) 12 Unit(s) SubCutaneous before breakfast  insulin lispro (ADMELOG) corrective regimen sliding scale   SubCutaneous three times a day before meals  insulin lispro (ADMELOG) corrective regimen sliding scale   SubCutaneous at bedtime  lactobacillus acidophilus 1 Tablet(s) Oral three times a day with meals  lisinopril 40 milliGRAM(s) Oral daily  metoclopramide 5 milliGRAM(s) Oral three times a day  metoprolol tartrate 25 milliGRAM(s) Oral two times a day  mirtazapine 7.5 milliGRAM(s) Oral <User Schedule>  pantoprazole    Tablet 40 milliGRAM(s) Oral two times a day    MEDICATIONS  (PRN):  acetaminophen     Tablet .. 650 milliGRAM(s) Oral every 6 hours PRN Temp greater or equal to 38C (100.4F), Mild Pain (1 - 3)  dextrose Oral Gel 15 Gram(s) Oral once PRN Blood Glucose LESS THAN 70 milliGRAM(s)/deciliter  dextrose Oral Gel 15 Gram(s) Oral once PRN Blood Glucose LESS THAN 70 milliGRAM(s)/deciliter  melatonin 3 milliGRAM(s) Oral at bedtime PRN Insomnia  ondansetron Injectable 4 milliGRAM(s) IV Push every 6 hours PRN Nausea and/or Vomiting    Allergies    No Known Allergies    Intolerances      Vital Signs Last 24 Hrs  T(C): 36.4 (29 Apr 2025 05:25), Max: 36.7 (28 Apr 2025 12:01)  T(F): 97.6 (29 Apr 2025 05:25), Max: 98.1 (28 Apr 2025 12:01)  HR: 91 (29 Apr 2025 05:25) (72 - 91)  BP: 155/78 (29 Apr 2025 05:25) (127/72 - 155/78)  BP(mean): --  RR: 18 (29 Apr 2025 05:25) (18 - 18)  SpO2: 98% (29 Apr 2025 05:25) (98% - 100%)    Parameters below as of 29 Apr 2025 05:25  Patient On (Oxygen Delivery Method): room air      I&O's Summary    28 Apr 2025 07:01  -  29 Apr 2025 07:00  --------------------------------------------------------  IN: 120 mL / OUT: 400 mL / NET: -280 mL          TELE: Off cardiac monitor   PHYSICAL EXAM:  Constitutional: NAD, awake and alert  HEENT: Moist Mucous Membranes, Anicteric  Pulmonary: Non-labored, breath sounds are clear bilaterally, No wheezing, rales or rhonchi  Cardiovascular: Regular, S1 and S2, No murmurs, No rubs, gallops or clicks  Gastrointestinal:  soft, nontender, nondistended   Lymph: No peripheral edema. No lymphadenopathy.   Skin: No visible rashes or ulcers.  Psych:  Mood & affect appropriate      LABS: All Labs Reviewed:                        9.8    10.44 )-----------( 341      ( 29 Apr 2025 07:18 )             29.6                         9.8    10.44 )-----------( 299      ( 28 Apr 2025 07:20 )             32.1                         10.5   9.98  )-----------( 320      ( 27 Apr 2025 08:50 )             33.8     29 Apr 2025 07:18    145    |  111    |  11     ----------------------------<  122    3.7     |  26     |  0.45   28 Apr 2025 07:20    143    |  108    |  10     ----------------------------<  255    3.9     |  29     |  0.59   27 Apr 2025 08:50    143    |  112    |  12     ----------------------------<  218    5.1     |  27     |  0.57     Ca    8.4        29 Apr 2025 07:18  Ca    8.7        28 Apr 2025 07:20  Ca    9.0        27 Apr 2025 08:50  Phos  2.7       28 Apr 2025 07:20  Phos  2.9       27 Apr 2025 08:50  Phos  2.6       26 Apr 2025 09:19  Mg     1.9       29 Apr 2025 07:18  Mg     2.0       28 Apr 2025 07:20  Mg     2.0       27 Apr 2025 08:50    TPro  6.8    /  Alb  2.3    /  TBili  0.3    /  DBili  x      /  AST  18     /  ALT  15     /  AlkPhos  125    26 Apr 2025 09:19       12 Lead ECG:   Ventricular Rate 83 BPM    Atrial Rate 83 BPM    P-R Interval 114 ms    QRS Duration 74 ms    Q-T Interval 364 ms    QTC Calculation(Bazett) 427 ms    P Axis 96 degrees    R Axis 40 degrees    T Axis 83 degrees    Diagnosis Line Sinus rhythm with occasional premature ventricular complexes  Minimal voltage criteria for LVH, may be normal variant ( Sokolow-Riley )  Nonspecific ST and T wave abnormality  Abnormal ECG  When compared with ECG of 24-APR-2025 10:08,  premature ventricular complexes arenow present  Confirmed by Keren Watson ([id number]) on 4/27/2025 6:44:47 AM (04-26-25 @ 13:14)      TRANSTHORACIC ECHOCARDIOGRAM REPORT  ________________________________________________________________________________                                      _______       Pt. Name:       TERESA MCHUGH Study Date:    4/24/2025  MRN:            [id number]       YOB: 1950  Accession #:    [id number]       Age:           74 years  Account#:       [id number]      Gender:        F  Heart Rate:                     Height:        62.99 in (160.00 cm)  Rhythm:                         Weight:        125.66 lb (57.00 kg)  Blood Pressure: 163/69 mmHg     BSA/BMI:       1.59 m² / 22.27 kg/m²  ________________________________________________________________________________________  Referring Physician:    [id number] Navid Plummer  Interpreting Physician: Lopez Cano M.D.  Primary Sonographer:    Agustin Quesada    CPT:               ECHO TTE WO CON COMP W DOPP - 44868.m  Indication(s):     Unspecified atrial fibrillation - I48.91  Procedure:         Transthoracic echocardiogram with 2-D, M-mode and complete                     spectral and color flow Doppler.  Ordering Location: [facility]  Admission Status:  Inpatient  Study Information: Image quality for this study is technically difficult.    _______________________________________________________________________________________     CONCLUSIONS:      1. Technically difficult image quality.   2. Left ventricular cavity is normal in size. Left ventricular systolic function is normal with an ejection fraction of 64 % by Pond's method of disks.   3. Normal right ventricular cavity size and normal right ventricular systolic function.   4. Mild mitral regurgitation.   5. Mild tricuspid regurgitation.   6. There is focal calcification of the aortic valve leaflets.   7. Estimated pulmonary artery systolic pressure is 29 mmHg.   8. There is mild (grade 1) left ventricular diastolic dysfunction.    ________________________________________________________________________________________  FINDINGS:     Left Ventricle:  The left ventricular cavity is normal in size. Left ventricular systolic function is normal with a calculated ejection fraction of 64 % by the Pond's biplane method of disks. There is normal LV mass and concentric remodeling. There is mild (grade 1) left ventricular diastolic dysfunction.     Right Ventricle:  The right ventricular cavity is normal in size and right ventricular systolic function is normal. Tricuspid annular plane systolic excursion (TAPSE) is 2.0 cm (normal >=1.7 cm).     Left Atrium:  The left atrium is normal in size.     Right Atrium:  The right atrium is normal in size with an indexed volume of 13.61 ml/m² and an indexed area of 6.36 cm²/m².     Interatrial Septum:  The interatrial septum appears intact.     Aortic Valve:  The aortic valve appears trileaflet with normal systolic excursion. There is focal calcification of the aortic valve leaflets. There is fibrocalcific aortic valve sclerosis without stenosis. There is no evidence of aortic regurgitation.     Mitral Valve:  There is mitral valve thickening of the anterior and posterior leaflets. There is calcification of the mitral valve annulus. There is mild mitral regurgitation.     Tricuspid Valve:  Structurally normal tricuspid valve with normal leaflet excursion. Thereis mild tricuspid regurgitation. Estimated pulmonary artery systolic pressure is 29 mmHg.     Pulmonic Valve:  The pulmonic valve was not well visualized.     Aorta:  The aortic root at the sinuses of Valsalva is normal in size, measuring 2.90 cm (indexed 1.83 cm/m²). The ascending aorta is normal in size, measuring 2.80 cm (indexed 1.76 cm/m²). The aortic arch diameter is normal in size, measuring 2.6 cm (indexed 1.64 cm/m²).     Pericardium:  No pericardial effusion seen.     Systemic Veins:  The inferior vena cava is normal in size measuring 1.14 cm in diameter, (normal <2.1cm) with normal inspiratory collapse (normal >50%) consistent with normal right atrial pressure (~3, range 0-5mmHg).  ____________________________________________________________________  QUANTITATIVE DATA:  Left Ventricle Measurements: (Indexed to BSA)     IVSd (2D):   1.1 cm  LVPWd (2D):  1.0 cm  LVIDd (2D):  3.9 cm  LVIDs (2D):  2.6 cm  LV Mass:     139 g  87.8 g/m²  LV Vol d, MOD A2C: 47.2 ml 29.74 ml/m²  LV Vold, MOD A4C: 53.1 ml 33.46 ml/m²  LV Vol d, MOD BP:  53.1 ml 33.44 ml/m²  LV Vol s, MOD A2C: 17.8 ml 11.22 ml/m²  LV Vol s, MOD A4C: 19.7 ml 12.41 ml/m²  LV Vol s, MOD BP:  19.3 ml 12.18 ml/m²  LVOT SV MOD BP:    33.7 ml  LV EF% MOD BP:     64 %     MV E Vmax:    0.98 m/s  MV A Vmax:    1.35 m/s  MV E/A:       0.73  e' lateral:   8.81 cm/s  e' medial:    9.79 cm/s  E/e' lateral: 11.12  E/e' medial:  10.01  E/e' Average: 10.54  MV DT:        218 msec    Aorta Measurements: (Normal range) (Indexed to BSA)     Ao Root d     2.90 cm (2.7 - 3.3 cm) 1.83 cm/m²  Ao Asc d, 2D: 2.80  Ao Asc prox:  2.80 cm                1.76 cm/m²  Ao Arch:      2.6 cm            Left Atrium Measurements: (Indexed to BSA)  LA Diam 2D: 2.90 cm         Right Ventricle Measurements: Right Atrial Measurements:     TAPSE:           2.0 cm       RA Vol s, MOD A4C         21.6 ml  RV Base (RVID1): 3.6 cm       RA Vol s, MOD A4C i BSA   13.61 ml/m²  RV Mid (RVID2):  2.7 cm       RA Area s, MOD A4C        10.1 cm²                       RA Area s, MOD A4C, i BSA 6.36 cm²/m²       LVOT / RVOT/ Qp/Qs Data: (Indexed to BSA)  LVOT Diameter,s: 1.90 cm  LVOT Area:       2.84 cm²  LVOT Vmax:       1.16 m/s  LVOT Vmn:        0.749 m/s  LVOT VTI:        22.30 cm  LVOT peak grad:  5 mmHg  LVOT mean grad:  2.0 mmHg  LVOT SV:         63.2 ml   39.84 ml/m²    Aortic Valve Measurements:  AV Vmax:                1.2 m/s  AV Peak Gradient:       6.1 mmHg  AV Mean Gradient:       3.0 mmHg  AV VTI:                 24.4 cm  AVVTI Ratio:           0.91  AoV EOA, Contin:        2.59 cm²  AoV EOA, Contin i:      1.63 cm²/m²  AoV area, (CE):         2.59 cm²  AoV area, (CE), i:      1.63 cm²/m²  AoV Dimensionless Index 0.91    Mitral Valve Measurements:     MV E Vmax: 1.0 m/s        MR Vmax:          2.67 m/s  MV A Vmax: 1.4 m/s         MR Peak Gradient: 28.5 mmHg  MV E/A:    0.7       Tricuspid Valve Measurements:     TR Vmax:          2.6 m/s  TR Peak Gradient: 26.0 mmHg  RA Pressure:      3 mmHg  PASP:             29 mmHg    ________________________________________________________________________________________  Electronically signed on 4/24/2025 at 2:29:29 PM by Lopez Cano M.D.         *** Final ***

## 2025-04-29 NOTE — SWALLOW BEDSIDE ASSESSMENT ADULT - COMMENTS
Per charting 4/28, "74-year-old white female  presently residing in rehab center with history of diabetes mellitus on insulin hypertension and hyperlipidemia who had developed vascular ulcers/gangrene left lower extremity a and he was evaluated as outpatient by vascular surgeon scheduled for surgery today and because of glucose being out of control was sent to the emergency department here at [facility] for further evaluation care treatment and management as well as stabilization of patient from a metabolic and glucose standpoint."    CXR 4/21 "No acute infiltrate.    Patient seen at bedside, awake/alert, during clinical swallow evaluation this PM. Patient noted with confusion, however able to follow simple directions and answer yes/no questions. Patient's  present during assessment.

## 2025-04-29 NOTE — SWALLOW BEDSIDE ASSESSMENT ADULT - SWALLOW EVAL: DIAGNOSIS
1) Mild oral dysphagia for puree, mildly thick liquids, and thin liquids marked by prolonged bolus manipualtion and slow coordination. Delayed posterior bolus transfer and adequate oral clearance noted. 2) Functional phayrngeal stage of swallow for puree, and mildly thick liquids marked by initiation of pharyngeal swallow trigger and hyolaryngeal excursion noted upon digital palpation. No overt signs/symptoms of penetration/aspiration noted. 3) Moderate pharyngeal dysphagia for thin liquids marked by a suspected delayed pharyngeal swallow trigger with hyolaryngeal elevation noted upon digital palpation. Patient with immediate cough following oral intake suggestive of airway penetration/aspiration.

## 2025-04-29 NOTE — PROGRESS NOTE ADULT - SUBJECTIVE AND OBJECTIVE BOX
[facility]    Memo Irby NP    [address] [place] [address]  [phone]      Chief Complaint:  Patient is a 74y old  Female who presents with a chief complaint of Hyperglycemia (29 Apr 2025 09:05)      HPI/ 24 hr events:   Patient seen and examined at bedside with spouse present  Per spouse, patient overall improving  Tolerating pureed diet, awaiting S&S eval       REVIEW OF SYSTEMS:   General: Negative  HEENT: Negative  CV: Negative  Respiratory: Negative  GI: See HPI  : Negative  MSK: Negative  Hematologic: Negative  Skin: Negative    MEDICATIONS:   MEDICATIONS  (STANDING):  amLODIPine   Tablet 10 milliGRAM(s) Oral daily  apixaban 5 milliGRAM(s) Oral every 12 hours  aspirin  chewable 81 milliGRAM(s) Oral daily  atorvastatin 20 milliGRAM(s) Oral at bedtime  ciprofloxacin     Tablet 500 milliGRAM(s) Oral every 12 hours  dextrose 5%. 1000 milliLiter(s) (50 mL/Hr) IV Continuous <Continuous>  dextrose 5%. 1000 milliLiter(s) (100 mL/Hr) IV Continuous <Continuous>  dextrose 50% Injectable 25 Gram(s) IV Push once  dextrose 50% Injectable 12.5 Gram(s) IV Push once  dextrose 50% Injectable 25 Gram(s) IV Push once  donepezil 5 milliGRAM(s) Oral at bedtime  glucagon  Injectable 1 milliGRAM(s) IntraMuscular once  insulin glargine Injectable (LANTUS) 12 Unit(s) SubCutaneous before breakfast  insulin lispro (ADMELOG) corrective regimen sliding scale   SubCutaneous at bedtime  insulin lispro (ADMELOG) corrective regimen sliding scale   SubCutaneous three times a day before meals  lactobacillus acidophilus 1 Tablet(s) Oral three times a day with meals  lisinopril 40 milliGRAM(s) Oral daily  metoclopramide 5 milliGRAM(s) Oral three times a day  metoprolol tartrate 25 milliGRAM(s) Oral two times a day  mirtazapine 7.5 milliGRAM(s) Oral <User Schedule>  pantoprazole    Tablet 40 milliGRAM(s) Oral two times a day  polyethylene glycol 3350 17 Gram(s) Oral daily    MEDICATIONS  (PRN):  acetaminophen     Tablet .. 650 milliGRAM(s) Oral every 6 hours PRN Temp greater or equal to 38C (100.4F), Mild Pain (1 - 3)  dextrose Oral Gel 15 Gram(s) Oral once PRN Blood Glucose LESS THAN 70 milliGRAM(s)/deciliter  dextrose Oral Gel 15 Gram(s) Oral once PRN Blood Glucose LESS THAN 70 milliGRAM(s)/deciliter  melatonin 3 milliGRAM(s) Oral at bedtime PRN Insomnia  ondansetron Injectable 4 milliGRAM(s) IV Push every 6 hours PRN Nausea and/or Vomiting      ALLERGIES:   Allergies    No Known Allergies    Intolerances        VITAL SIGNS:   Vital Signs Last 24 Hrs  T(C): 36.4 (29 Apr 2025 05:25), Max: 36.7 (28 Apr 2025 12:01)  T(F): 97.6 (29 Apr 2025 05:25), Max: 98.1 (28 Apr 2025 12:01)  HR: 91 (29 Apr 2025 05:25) (72 - 91)  BP: 155/78 (29 Apr 2025 05:25) (127/72 - 155/78)  BP(mean): --  RR: 18 (29 Apr 2025 05:25) (18 - 18)  SpO2: 98% (29 Apr 2025 05:25) (98% - 100%)    Parameters below as of 29 Apr 2025 05:25  Patient On (Oxygen Delivery Method): room air      I&O's Summary    28 Apr 2025 07:01  -  29 Apr 2025 07:00  --------------------------------------------------------  IN: 120 mL / OUT: 400 mL / NET: -280 mL        PHYSICAL EXAM:   GENERAL:  No acute distress  HEENT:  NC/AT  CHEST:  No increased effort  HEART:  Regular rate  ABDOMEN:  Soft, non-tender, non-distended  EXTREMITIES: No cyanosis  SKIN:  Warm, dry  NEURO:  Calm, cooperative    LABS:                        9.8    10.44 )-----------( 341      ( 29 Apr 2025 07:18 )             29.6     04-29    145  |  111[H]  |  11  ----------------------------<  122[H]  3.7   |  26  |  0.45[L]    Ca    8.4[L]      29 Apr 2025 07:18  Phos  2.7     04-28  Mg     1.9     04-29                                          RADIOLOGY & ADDITIONAL STUDIES:

## 2025-04-29 NOTE — PROGRESS NOTE ADULT - SUBJECTIVE AND OBJECTIVE BOX
[facility]                             Nephrology and Hypertension                             Geovani Branch                                          [phone]     Patient is a 74y old  Female who presents with a chief complaint of Hyperglycemia (26 Apr 2025 19:48)       HPI:  74-year-old white female  presently residing in rehab center with history of diabetes mellitus on insulin hypertension and hyperlipidemia who had developed vascular ulcers/gangrene left lower extremity a and he was evaluated as outpatient by vascular surgeon scheduled for surgery today and because of glucose being out of control was sent to the emergency department here at [facility] for further evaluation care treatment and management as well as stabilization of patient from a metabolic and glucose standpoint.  Patient states that on a good day her sugar runs 180-200.  No recent fever chills nausea vomiting or diarrhea.    Renal consulted on 4/27/25 for persistent hypokalemia    No new complaints       PAST MEDICAL & SURGICAL HISTORY:  DM (diabetes mellitus)      Multiple sclerosis      DM (diabetes mellitus)      HTN (hypertension)      No significant past surgical history           FAMILY HISTORY:  NC    Social History:Non smoker    MEDICATIONS  (STANDING):  amLODIPine   Tablet 5 milliGRAM(s) Oral daily  apixaban 5 milliGRAM(s) Oral every 12 hours  aspirin  chewable 81 milliGRAM(s) Oral daily  atorvastatin 20 milliGRAM(s) Oral at bedtime  ciprofloxacin     Tablet 500 milliGRAM(s) Oral every 12 hours  dextrose 5%. 1000 milliLiter(s) (50 mL/Hr) IV Continuous <Continuous>  dextrose 5%. 1000 milliLiter(s) (100 mL/Hr) IV Continuous <Continuous>  dextrose 50% Injectable 25 Gram(s) IV Push once  dextrose 50% Injectable 12.5 Gram(s) IV Push once  dextrose 50% Injectable 25 Gram(s) IV Push once  donepezil 5 milliGRAM(s) Oral at bedtime  glucagon  Injectable 1 milliGRAM(s) IntraMuscular once  insulin glargine Injectable (LANTUS) 15 Unit(s) SubCutaneous before breakfast  insulin lispro (ADMELOG) corrective regimen sliding scale   SubCutaneous three times a day before meals  insulin lispro (ADMELOG) corrective regimen sliding scale   SubCutaneous at bedtime  lactated ringers 1000 milliLiter(s) (60 mL/Hr) IV Continuous <Continuous>  lisinopril 40 milliGRAM(s) Oral daily  metoprolol succinate ER 50 milliGRAM(s) Oral daily  mirtazapine 7.5 milliGRAM(s) Oral <User Schedule>  saccharomyces boulardii 250 milliGRAM(s) Oral two times a day    MEDICATIONS  (PRN):  acetaminophen     Tablet .. 650 milliGRAM(s) Oral every 6 hours PRN Temp greater or equal to 38C (100.4F), Mild Pain (1 - 3)  dextrose Oral Gel 15 Gram(s) Oral once PRN Blood Glucose LESS THAN 70 milliGRAM(s)/deciliter  dextrose Oral Gel 15 Gram(s) Oral once PRN Blood Glucose LESS THAN 70 milliGRAM(s)/deciliter  melatonin 3 milliGRAM(s) Oral at bedtime PRN Insomnia  ondansetron Injectable 4 milliGRAM(s) IV Push every 6 hours PRN Nausea and/or Vomiting   Meds reviewed    Allergies    No Known Allergies    Intolerances         REVIEW OF SYSTEMS:    Review of Systems:   as above      Vital Signs Last 24 Hrs  T(C): 36.4 (29 Apr 2025 05:25), Max: 36.7 (28 Apr 2025 19:59)  T(F): 97.6 (29 Apr 2025 05:25), Max: 98 (28 Apr 2025 19:59)  HR: 91 (29 Apr 2025 05:25) (75 - 91)  BP: 155/78 (29 Apr 2025 05:25) (140/70 - 155/78)  BP(mean): --  RR: 18 (29 Apr 2025 05:25) (18 - 18)  SpO2: 98% (29 Apr 2025 05:25) (98% - 99%)    Parameters below as of 29 Apr 2025 05:25  Patient On (Oxygen Delivery Method): room air                PHYSICAL EXAM:    GENERAL: NAD  HEAD:  Atraumatic, Normocephalic  EYES: EOMI, conjunctiva and sclera clear  ENMT: No Drainage from nares, No drainage from ears  NECK: Supple, neck  veins full  NERVOUS SYSTEM:  Awake and Alert  EXTREMITIES:  No Edema  SKIN: No rashes No obvious ecchymosis      LABS:                                            9.8    10.44 )-----------( 341      ( 29 Apr 2025 07:18 )             29.6     04-29    145  |  111[H]  |  11  ----------------------------<  122[H]  3.7   |  26  |  0.45[L]    Ca    8.4[L]      29 Apr 2025 07:18  Phos  2.7     04-28  Mg     1.9     04-29        Urinalysis Basic - ( 29 Apr 2025 07:18 )    Color: x / Appearance: x / SG: x / pH: x  Gluc: 122 mg/dL / Ketone: x  / Bili: x / Urobili: x   Blood: x / Protein: x / Nitrite: x   Leuk Esterase: x / RBC: x / WBC x   Sq Epi: x / Non Sq Epi: x / Bacteria: x         X Size Of Lesion In Cm (Optional): 0.8

## 2025-04-29 NOTE — SOCIAL WORK PROGRESS NOTE - NSSWPROGRESSNOTE_GEN_ALL_CORE
CHASE spoke with pt's  Trevon on the phone and discussed d/c planning. CHASE explained that per MD, pt is likely discharging tomorrow. Trevon understands and is in agreement. SW to follow up with updates. CHASE to remain available for any needs. CHASE spoke with pt's  Trevon on the phone and discussed d/c planning. CHASE explained that per MD, pt is likely discharging tomorrow. Trevon understands and is in agreement. [facility] notified about d/c and can accept pt tomorrow. SW to follow up with Trevon and provide updates. CHASE spoke with pt's  Trevon on the phone and discussed d/c planning. CHASE explained that per MD, pt is likely discharging tomorrow if is medically stable. Trevon understands and is in agreement. [facility] notified about d/c and can accept pt tomorrow. SW to follow up with Trevon and provide updates.

## 2025-04-29 NOTE — PROGRESS NOTE ADULT - ASSESSMENT
Hypokalemia  Cellulitis  HTN  DM  COVID  FTT    -Hypokalemia due to GI loss/low intake. No signs of renal potassium wasting   -Will check TTKG and FeK if potassium continue to worsen, but not necessary now  -Mg level normal  -No renal objection to dc  -K improved

## 2025-04-29 NOTE — PROGRESS NOTE ADULT - ASSESSMENT
74-year-old white female  presently residing in rehab center with history of diabetes mellitus on insulin hypertension and hyperlipidemia who had developed vascular ulcers/gangrene left lower extremity, admitted for infection, consulted for new onset Afib     - Presented with fever, Covid PNA. Called for ? new onset A fib   - EKG on 4/23 showed Possible A fib vs PAT. , tele with brief pat   - Holter Monitor 8/2022: Sinus with PACs, no significant events   - Continue Eliquis for Embolic CVA   - Continue BB    - EKG: A fib vs  bpm   - No evidence of any active ischemia   - Stress Testing 8/2022: Normal study, no EKG changes or chest pain, LVEF 65%   - Continue aspirin and statin     - Technically difficult ECHO showed normal LV & RV size and function EF 64%, Mild MR and TR, Grade 1 DD  - No evidence of any meaningful volume overload     - BP stable   - Continue amlodipine, Lisinopril and bb     Anemia per primary     - COVID management per primary   - Monitor and replete lytes, keep K>4, Mg>2.  - Will continue to follow.  Vinicius Palacios DNP, AGACNP-BC  Cardiology   Call Teams

## 2025-04-29 NOTE — DIETITIAN NUTRITION RISK NOTIFICATION - TREATMENT: THE FOLLOWING DIET HAS BEEN RECOMMENDED
Diet, Consistent Carbohydrate w/Evening Snack:   Pureed (PUREED)  Mildly Thick Liquids (MILDTHICKLIQS)  Free Water Flush Instructions:  please give chocolate sugar free health shakes  Supplement Feeding Modality:  Oral  Health Shake No Sugar Cans or Servings Per Day:  1       Frequency:  Three Times a day (04-28-25 @ 08:59) [Active]

## 2025-04-29 NOTE — CHART NOTE - NSCHARTNOTEFT_GEN_A_CORE
Assessment:   brief history: 74-year-old white female with PMH HTN, HLD, diabetes mellitus, and PAD S/P multiple LLE angios with vascular ulcers/gangrene left lower extremity. Presented for elective LLE angiogram and was noted with elevated WBC and glucose and sent to ED for medical optimization prior to surgery. COVID+ on isolation; no acute surgical intervention at this time. Pt at high risk for limb loss.    (4/29) Patient seen for nutrition follow up. Hospital course noted, chart reviewed. Spoke with RN and patient's  as patient getting washed up at time of my visit.  Patient was downgraded to pureed texture with mildly thick liquids, as she was experiencing difficulty swallowing on previous soft and bite sized texture. Pending formal bedside swallow evaluation to determine appropriate diet texture. Patient continues with intermittent nausea, being addressed with Reglan and Zofran. She remains with minimal PO intake, and per her spouse, not a great eater at baseline. weight trending down since admission. Unable to complete NFPE at this time. Patient seems receptive to sugar free health shakes, has tried Magic cup  in the past at rehab and enjoyed. also obtained other food preferences, will provide in efforts to optimize her overall PO intake    Factors impacting intake: [x ] nausea  [ x] swallowing issues  [ x] other: decreased appetite    Diet Presciption: Diet, Consistent Carbohydrate w/Evening Snack:   Pureed (PUREED)  Mildly Thick Liquids (MILDTHICKLIQS)  Free Water Flush Instructions:  please give chocolate sugar free health shakes  Supplement Feeding Modality:  Oral  Health Shake No Sugar Cans or Servings Per Day:  1       Frequency:  Three Times a day (04-28-25 @ 08:59)    Intake: mainly 0-25% intake per documentation, her  confirms PO intake minimal    Current Weight: weights reviewed, loss noted. question accuracy as 16# weight loss in 1 week inaccurate, although some weight loss likely due to decreased PO intake. will continue to monitor  % Weight Change    Pertinent Medications: MEDICATIONS  (STANDING):  amLODIPine   Tablet 10 milliGRAM(s) Oral daily  apixaban 5 milliGRAM(s) Oral every 12 hours  aspirin  chewable 81 milliGRAM(s) Oral daily  atorvastatin 20 milliGRAM(s) Oral at bedtime  ciprofloxacin     Tablet 500 milliGRAM(s) Oral every 12 hours  dextrose 5%. 1000 milliLiter(s) (50 mL/Hr) IV Continuous <Continuous>  dextrose 5%. 1000 milliLiter(s) (100 mL/Hr) IV Continuous <Continuous>  dextrose 50% Injectable 25 Gram(s) IV Push once  dextrose 50% Injectable 12.5 Gram(s) IV Push once  dextrose 50% Injectable 25 Gram(s) IV Push once  donepezil 5 milliGRAM(s) Oral at bedtime  glucagon  Injectable 1 milliGRAM(s) IntraMuscular once  insulin glargine Injectable (LANTUS) 12 Unit(s) SubCutaneous before breakfast  insulin lispro (ADMELOG) corrective regimen sliding scale   SubCutaneous three times a day before meals  insulin lispro (ADMELOG) corrective regimen sliding scale   SubCutaneous at bedtime  lactobacillus acidophilus 1 Tablet(s) Oral three times a day with meals  lisinopril 40 milliGRAM(s) Oral daily  metoclopramide 5 milliGRAM(s) Oral three times a day  metoprolol tartrate 25 milliGRAM(s) Oral two times a day  mirtazapine 7.5 milliGRAM(s) Oral <User Schedule>  pantoprazole    Tablet 40 milliGRAM(s) Oral two times a day  polyethylene glycol 3350 17 Gram(s) Oral daily    MEDICATIONS  (PRN):  acetaminophen     Tablet .. 650 milliGRAM(s) Oral every 6 hours PRN Temp greater or equal to 38C (100.4F), Mild Pain (1 - 3)  dextrose Oral Gel 15 Gram(s) Oral once PRN Blood Glucose LESS THAN 70 milliGRAM(s)/deciliter  dextrose Oral Gel 15 Gram(s) Oral once PRN Blood Glucose LESS THAN 70 milliGRAM(s)/deciliter  melatonin 3 milliGRAM(s) Oral at bedtime PRN Insomnia  ondansetron Injectable 4 milliGRAM(s) IV Push every 6 hours PRN Nausea and/or Vomiting    Pertinent Labs: 04-29 Na145 mmol/L Glu 122 mg/dL[H] K+ 3.7 mmol/L Cr  0.45 mg/dL[L] BUN 11 mg/dL 04-28 Phos 2.7 mg/dL 04-26 Alb 2.3 g/dL[L] 04-23 PAB 9 mg/dL[L]     CAPILLARY BLOOD GLUCOSE      POCT Blood Glucose.: 215 mg/dL (29 Apr 2025 11:59)  POCT Blood Glucose.: 134 mg/dL (29 Apr 2025 08:41)  POCT Blood Glucose.: 102 mg/dL (28 Apr 2025 21:12)  POCT Blood Glucose.: 191 mg/dL (28 Apr 2025 17:18)  POCT Blood Glucose.: 173 mg/dL (28 Apr 2025 15:29)  POCT Blood Glucose.: 165 mg/dL (28 Apr 2025 12:37)    Skin: + 2 left/right hand edema, stage 2 sacrum pressure injury per documentation    Estimated Needs:   [x ] no change since previous assessment  [id number] calories/day   g protein/day      Previous Nutrition Diagnosis:    [ x] Increased Nutrient Needs   [x ] Altered nutrition related labs   Nutrition Diagnosis is [ x] ongoing      New Nutrition Diagnosis: Now with nutrition dx of moderate malnutrition related to inability to consume sufficient energy intake as evidenced by wt loss and <75% PO intake     Interventions:   Recommend  [ ] Change Diet To:  [ x] Nutrition Supplement: continue with sugar free health shakes TID while patient requires thickened liquids  [ ] Nutrition Support  [x ] Other: honor food preferences in compliance with therapeutic diet in efforts to optimize PO intake    Monitoring and Evaluation:   [x ] PO intake [ x ] Tolerance to diet prescription [ x ] weights [ x ] labs[ x ] follow up per protocol  [ ] other:

## 2025-04-29 NOTE — PROGRESS NOTE ADULT - ASSESSMENT
75yo woman with PMH of HTN and DM2, presently residing in rehab center had developed vascular ulcers/gangrene left lower extremity a and she was evaluated as outpatient by vascular surgeon scheduled for surgery today and because of glucose being out of control was sent to the emergency department here at [facility] for further evaluation, treatment and management as well as stabilization of patient from a metabolic and glucose standpoint. No recent fever chills nausea vomiting or diarrhea.  She had leukocytosis of 15k in ED but no fever.   ESR 70 and CRP 36     She tested positive for COVID in ED even though no symptoms, not on o2 and CXR negative for pneumonia. Unclear when had covid but since asymptomatic can be watched.   Leg wound could be vascular with gangrene, decubitus ulcer is a possibility too.     # Vascular ulcers in LLE  # Uncontrolled Diabetes mellitus   # COVID    - Blood Cultures NGTD  - Wound culture with pseudomonas, sensitivity demonstrating resistance to zosyn  - Stopped zosyn and started on Ciprofloxacin (possibly needs 2 weeks, 5/4/25 would be the last day)  - Monitor WBC, normal   - Vascular and wound care follow up after discharge   - MRSA PCR positive will treat with mupirocin   - For COVID will watch for now, no need for remdesivir or steroid at this time   - AC as per protocol     Will follow PRN.    Gregory Huitron MD  Division of Infectious Diseases   Please call ID service at [phone] with any question.    50 minutes spent on total encounter assessing patient, examination, chart review, counseling and coordinating care by the attending physician/nurse/care manager.

## 2025-04-29 NOTE — PROGRESS NOTE ADULT - TIME BILLING
activities including direct patient care, counseling and/or coordinating care, reviewing notes/lab data/imaging, and discussion with multidisciplinary team
Please call us with any concerns or questions.   We will continue to follow.   Dr. Hawthorne PRN for the Weekend and Dr. Huitron will follow up on Monday   Further recommendations to follow based on clinical  progression as well as availability of lab data    Milad Cisneros MD   Division of Infectious Diseases  [facility]   Cell [phone] between 8am and 6pm   After 6pm and weekends please call ID service at [phone].
activities including direct patient care, counseling and/or coordinating care, reviewing notes/lab data/imaging, and discussion with multidisciplinary team
activities including direct patient care, counseling and/or coordinating care, reviewing notes/lab data/imaging, and discussion with multidisciplinary team
activities including direct patient care, counseling and/or coordinating care, reviewing notes/lab data/imaging, and discussion with multidisciplinary team (excluding time spent on resident teaching)
activities including direct patient care, counseling and/or coordinating care, reviewing notes/lab data/imaging, and discussion with multidisciplinary team

## 2025-04-29 NOTE — PROGRESS NOTE ADULT - NSPROGADDITIONALINFOA_GEN_ALL_CORE
d/w , son & daughter Yvette who came from [place]
I reviewed the overnight course of events on the unit, re-confirming the patient history. I discussed the care with the patient and their family  The plan of care was discussed with the nurse practitioner and modifications were made to the notation where appropriate.   Differential diagnosis and plan of care discussed with patient after the evaluation  35 minutes spent on total encounter of which more than fifty percent of the encounter was spent counseling and/or coordinating care by the attending physician.  Advanced care planning was discussed with patient and family.  Advanced care planning forms were reviewed and discussed.  Risks, benefits and alternatives of gastroenterologic procedures were discussed in detail and all questions were answered.
I reviewed the overnight course of events on the unit, re-confirming the patient history. I discussed the care with the patient and their family  The plan of care was discussed with the nurse practitioner and modifications were made to the notation where appropriate.   Differential diagnosis and plan of care discussed with patient after the evaluation  35 minutes spent on total encounter of which more than fifty percent of the encounter was spent counseling and/or coordinating care by the attending physician.  Advanced care planning was discussed with patient and family.  Advanced care planning forms were reviewed and discussed.  Risks, benefits and alternatives of gastroenterologic procedures were discussed in detail and all questions were answered.
D/w patient, son & consultants - including PC

## 2025-04-29 NOTE — PROGRESS NOTE ADULT - ASSESSMENT
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 Found to have paroxysmal atrial fibrillation, now back in SR.    Infected LLE Ulcers  Cellulitis/SSTI  PAD  - wound cx with pseudomonas - sensitivities noted - resistant to zosyn - changed to ciprofloxacin - QTC noted - no plans for vascular intervention will d/w ID further re: antibiotic course length (potentially 2 weeks)  - sepsis POA (evolving) secondary to SSTI & COVID - now resolved  - BCx negative. UCx with growth pending ID  - per vascular- no acute intervention now  - continue AC/aspirin + statin  - ID following Dr. Huitron/Blayne  - Vascular following Dr. Burden  - Disposition planning to [facility]  - PT consulted  - Discharge planning - medically stable for discharge  - Spoke to lala Law 4/29    Nausea  Failure to thrive/Malnutrition  Depression  - per  report - nausea is not new - has been dealing with this for a while  -  reports patient takes a pill before eating  - suspect pt with gastroparesis and now with worsening symptoms due to COVID  - will trial some standing reglan + PPI BID -->improved  - downgrade diet to pureed with mildly thickened liquids - SLP evaluation noted  - continue nutrition supplements  - will continue remeron    COVID infection  - no role for remdesivir/decadron  - supportive care  - ID following    Atrial fibrillation, now in SR  Paroxsymal Atrial Tachycardia  - Afib with RVR vs PAT 4/23 AM likely reactive secondary to sepsis  - Reviewed initial EKG from 4/23 - d/w cardiology 4/25 - question if p wave present so possible this was PAT but cannot rule out atrial fibrillation  - unfortunately was placed on telemetry monitoring hours after initial EKG - has been SR since start of telemetry monitoring - regardless, has been having bursts of tachycardia & already on AC for history of embolic CVA, so will treat as such given current information & clinical status  - patient without documented history of afib although per outpatient review, had embolic CVA with suspected cardiogenic etiology  - suspect patient with paroxysmal atrial fibrillation with extreme stress i.e 4/23 with fever & sepsis  - continue home apixaban  - continue toprol XL  - TTE reviewed - normal EF, mild valvular regurgitations, mild DD  - cardiology consult Dr. Lanza appreciated    DM1, uncontrolled  - A1C 7.8  - had hypoglycemia due to poor PO  - continue 12u lantus qAM  - continue low dose insulin corrective scale  - monitor BG, hypoglycemia protocol  - endocrine follow up    Electrolyte abnormality  - improved  - monitor and replete lytes    HTN  - BP well controlled  - continue home dose amlodipine 10mg daily  - continue lisinopril 40mg  - continue toprol XL to 50mg daily  - hold lasix for now  - monitor HDs    History of embolic CVA  - continue apixaban + statin    Alzheimer Dementia  - continue donepezil    Prophylactic Measure  - VTE ppx: apixaban    GOC: DNR/DNI    Disposition: [facility]

## 2025-04-29 NOTE — PROGRESS NOTE ADULT - SUBJECTIVE AND OBJECTIVE BOX
[facility]   INFECTIOUS DISEASES   [address] [place] [address]  Tel: [phone]     Fax: [phone]  =========================================================  MD Blayne Bell Kaushal, MD Cho, Michelle, MD Sunjit, Jaspal, MD  =========================================================    [facility] [id number]    Follow up: Left lower leg ulcers. No new changes. NAD lying in bed, not on o2.   No fever     Allergies:  No Known Allergies    MEDICATIONS  (STANDING):  amLODIPine   Tablet 10 milliGRAM(s) Oral daily  apixaban 5 milliGRAM(s) Oral every 12 hours  aspirin  chewable 81 milliGRAM(s) Oral daily  atorvastatin 20 milliGRAM(s) Oral at bedtime  ciprofloxacin     Tablet 500 milliGRAM(s) Oral every 12 hours  dextrose 5%. 1000 milliLiter(s) (100 mL/Hr) IV Continuous <Continuous>  dextrose 5%. 1000 milliLiter(s) (50 mL/Hr) IV Continuous <Continuous>  dextrose 50% Injectable 25 Gram(s) IV Push once  dextrose 50% Injectable 12.5 Gram(s) IV Push once  dextrose 50% Injectable 25 Gram(s) IV Push once  donepezil 5 milliGRAM(s) Oral at bedtime  glucagon  Injectable 1 milliGRAM(s) IntraMuscular once  insulin glargine Injectable (LANTUS) 10 Unit(s) SubCutaneous before breakfast  insulin lispro (ADMELOG) corrective regimen sliding scale   SubCutaneous three times a day before meals  insulin lispro (ADMELOG) corrective regimen sliding scale   SubCutaneous at bedtime  lisinopril 40 milliGRAM(s) Oral daily  metoclopramide 5 milliGRAM(s) Oral three times a day  metoprolol succinate ER 50 milliGRAM(s) Oral daily  mirtazapine 7.5 milliGRAM(s) Oral <User Schedule>  pantoprazole    Tablet 40 milliGRAM(s) Oral two times a day  saccharomyces boulardii 250 milliGRAM(s) Oral two times a day     REVIEW OF SYSTEMS:  REVIEW OF SYSTEMS:  CONSTITUTIONAL:  No Fever or chills  HEENT:   No diplopia or blurred vision.  No earache, sore throat   CARDIOVASCULAR:  No chest pain or SOB  RESPIRATORY: + cough, no shortness of breath, PND or orthopnea.  GASTROINTESTINAL:  No nausea, vomiting or diarrhea.  GENITOURINARY:  No dysuria, frequency or urgency. No Blood in urine  SKIN:  leg wound     Physical Exam:  Vital Signs Last 24 Hrs  T(C): 36.6 (29 Apr 2025 13:44), Max: 36.7 (28 Apr 2025 19:59)  T(F): 97.8 (29 Apr 2025 13:44), Max: 98 (28 Apr 2025 19:59)  HR: 66 (29 Apr 2025 13:44) (66 - 91)  BP: 127/67 (29 Apr 2025 13:44) (127/67 - 155/78)  BP(mean): --  RR: 17 (29 Apr 2025 13:44) (17 - 18)  SpO2: 100% (29 Apr 2025 13:44) (98% - 100%)  Parameters below as of 29 Apr 2025 13:44  Patient On (Oxygen Delivery Method): room air  GEN: NAD  HEENT: normocephalic and atraumatic. EOMI. PERRL.    NECK: Supple.  No lymphadenopathy   LUNGS: Clear to auscultation.  HEART: Regular rate and rhythm   ABDOMEN: Soft, nontender, and nondistended.   EXTREMITIES: Without edema.  NEUROLOGIC: grossly intact.  PSYCHIATRIC: Appropriate affect .  SKIN: LLE with 2 areas of gangrenous ulcers, one on ankle on dorsal side and   larger one in posterolateral side of leg, some discharge under dry eschar     Labs:                        9.8    10.44 )-----------( 341      ( 29 Apr 2025 07:18 )             29.6     04-29    145  |  111[H]  |  11  ----------------------------<  122[H]  3.7   |  26  |  0.45[L]    Ca    8.4[L]      29 Apr 2025 07:18  Phos  2.7     04-28  Mg     1.9     04-29    Culture - Urine (collected 04-22-25 @ 03:30)  Source: Catheterized Catheterized  Final Report (04-24-25 @ 12:37):    10,000 - 49,000 CFU/mL Candida tropicalis "Susceptibilities not performed"    Culture - Wound Aerobic/Anaerobic (collected 04-21-25 @ 15:00)  Source: Swab Swab  Final Report (04-26-25 @ 15:27):    Moderate Pseudomonas aeruginosa  Organism: Pseudomonas aeruginosa (04-26-25 @ 15:27)  Organism: Pseudomonas aeruginosa (04-26-25 @ 15:27)    Sensitivities:      -  Levofloxacin: S <=0.5      -  Aztreonam: R >16      -  Cefepime: R >16      -  Piperacillin/Tazobactam: R >64      -  Ciprofloxacin: S <=0.25      -  Imipenem: S <=1      Method Type: HIMA      -  Meropenem: S <=1      -  Ceftazidime: R >16    Culture - Blood (collected 04-21-25 @ 09:15)  Source: Blood Blood-Peripheral  Final Report (04-26-25 @ 16:00):    No growth at 5 days    Culture - Blood (collected 04-21-25 @ 09:10)  Source: Blood Blood-Peripheral  Final Report (04-26-25 @ 16:00):    No growth at 5 days    WBC Count: 10.44 K/uL (04-29-25 @ 07:18)  WBC Count: 10.44 K/uL (04-28-25 @ 07:20)  WBC Count: 9.98 K/uL (04-27-25 @ 08:50)  WBC Count: 8.53 K/uL (04-26-25 @ 09:19)  WBC Count: 10.59 K/uL (04-25-25 @ 08:25)    Creatinine: 0.45 mg/dL (04-29-25 @ 07:18)  Creatinine: 0.59 mg/dL (04-28-25 @ 07:20)  Creatinine: 0.57 mg/dL (04-27-25 @ 08:50)  Creatinine: 0.54 mg/dL (04-26-25 @ 09:19)  Creatinine: 0.47 mg/dL (04-25-25 @ 08:25)    C-Reactive Protein: 36 mg/L (04-21-25 @ 09:15)    Sedimentation Rate, Erythrocyte: 65 mm/hr (04-23-25 @ 06:45)  Sedimentation Rate, Erythrocyte: 70 mm/hr (04-21-25 @ 09:15)     COVID-19 PCR: Detected (04-21-25 @ 23:50)    All imaging and data are reviewed.   < from: [facility] Duplex Lower Ext Vein Scan, Bashir (04.21.25 @ 14:14) >  IMPRESSION:  No evidence of deep venous thrombosis in the visualized deep veins of   either lower extremity noted above.

## 2025-04-29 NOTE — CHART NOTE - NSCHARTNOTESELECT_GEN_ALL_CORE
Event Note
Event Note
Nutrition Services
Event Note
Nutrition Services
Palliative/Event Note
Palliative/Event Note

## 2025-04-29 NOTE — PROGRESS NOTE ADULT - ASSESSMENT
Nausea  Decreased PO intake  COVID+  Hx DM type I    Plan:   - Suspect delayed gastric emptying  - Diet as tolerated, encourage Glucerna  - Continue trial of Reglan 5mg PO q8h total of 5 days  - PPI BID   - Anti-emetics PRN   - Monitor GI function   - Discussed with patient and spouse at bedside

## 2025-04-29 NOTE — PROVIDER CONTACT NOTE (OTHER) - ASSESSMENT
Pt is A0 x0/1, lethargic.
pt complaint of nausea, meds were given
patient is alert and oriented x 3, in room air; no s/s of respiratory distress noted; complaint of pain at LLE made; pain score 10; no other complaints made; IVF infusing as ordered.
patient is alert and oriented x 3, in room air; no s/s of respiratory distress noted; patient denies any chest pain or shortness of breath; no complaints at this time.
Pt denies any pain at this time.

## 2025-04-29 NOTE — PROVIDER CONTACT NOTE (OTHER) - SITUATION
covid-19 PCR test result-detected.
Pt takes meds crushed in applesauce, pt ordered for pantoprazole, Saccharomyces and metoprolol ER, these meds must be swallowed whole
Pt morning vitals obtained. RN notified of pt HR of 170 and oral temp of 99.4. Rectal temp obtained of 101.1. PO tylenol given as per MD order.
tele called: pt had an episode of SVT that did not sustained. HR went  to 160s, now it is currently in the 70s
complaint of pain at LLE; pain score 10.

## 2025-04-29 NOTE — PROVIDER CONTACT NOTE (OTHER) - REASON
complaint of pain at LLE; pain score 10.
Pt morning vitals obtained. RN notified of pt HR of 170 and oral temp of 99.4
Pt takes meds crushed in applesauce, pt ordered for pantoprazole, Saccharomyces and metoprolol ER, these meds must be swallowed whole
tele called: pt had an episode of SVT that did not sustained. HR went  to 160s
covid-19 PCR test result-detected.

## 2025-04-30 ENCOUNTER — TRANSCRIPTION ENCOUNTER (OUTPATIENT)
Age: 75
End: 2025-04-30

## 2025-04-30 LAB — SARS-COV-2 RNA SPEC QL NAA+PROBE: DETECTED

## 2025-04-30 PROCEDURE — 99497 ADVNCD CARE PLAN 30 MIN: CPT | Mod: 25

## 2025-04-30 PROCEDURE — 99233 SBSQ HOSP IP/OBS HIGH 50: CPT

## 2025-04-30 PROCEDURE — 99232 SBSQ HOSP IP/OBS MODERATE 35: CPT

## 2025-04-30 PROCEDURE — G0545: CPT

## 2025-04-30 RX ORDER — MIRTAZAPINE 30 MG/1
1 TABLET, FILM COATED ORAL
Qty: 0 | Refills: 0 | DISCHARGE
Start: 2025-04-30

## 2025-04-30 RX ORDER — GLYCOPYRROLATE 0.2 MG/ML
0.2 INJECTION INTRAMUSCULAR; INTRAVENOUS EVERY 6 HOURS
Refills: 0 | Status: DISCONTINUED | OUTPATIENT
Start: 2025-04-30 | End: 2025-05-01

## 2025-04-30 RX ORDER — GLUCAGON 3 MG/1
1 POWDER NASAL ONCE
Refills: 0 | Status: DISCONTINUED | OUTPATIENT
Start: 2025-04-30 | End: 2025-05-01

## 2025-04-30 RX ORDER — METOCLOPRAMIDE HCL 10 MG
5 TABLET ORAL THREE TIMES A DAY
Refills: 0 | Status: DISCONTINUED | OUTPATIENT
Start: 2025-04-30 | End: 2025-05-01

## 2025-04-30 RX ORDER — DONEPEZIL HYDROCHLORIDE 5 MG/1
1 TABLET ORAL
Refills: 0 | DISCHARGE

## 2025-04-30 RX ORDER — INSULIN LISPRO 100 U/ML
INJECTION, SOLUTION INTRAVENOUS; SUBCUTANEOUS
Refills: 0 | Status: DISCONTINUED | OUTPATIENT
Start: 2025-04-30 | End: 2025-05-01

## 2025-04-30 RX ORDER — DEXTROSE 50 % IN WATER 50 %
15 SYRINGE (ML) INTRAVENOUS ONCE
Refills: 0 | Status: DISCONTINUED | OUTPATIENT
Start: 2025-04-30 | End: 2025-05-01

## 2025-04-30 RX ORDER — FLUCONAZOLE 150 MG
150 TABLET ORAL ONCE
Refills: 0 | Status: DISCONTINUED | OUTPATIENT
Start: 2025-04-30 | End: 2025-04-30

## 2025-04-30 RX ORDER — HALOPERIDOL 10 MG/1
0.5 TABLET ORAL EVERY 6 HOURS
Refills: 0 | Status: DISCONTINUED | OUTPATIENT
Start: 2025-04-30 | End: 2025-05-01

## 2025-04-30 RX ORDER — LACTOBACILLUS ACIDOPHILUS/PECT 75 MM-100
1 CAPSULE ORAL
Qty: 0 | Refills: 0 | DISCHARGE
Start: 2025-04-30

## 2025-04-30 RX ORDER — SODIUM CHLORIDE 9 G/1000ML
1000 INJECTION, SOLUTION INTRAVENOUS
Refills: 0 | Status: DISCONTINUED | OUTPATIENT
Start: 2025-04-30 | End: 2025-05-01

## 2025-04-30 RX ORDER — APIXABAN 2.5 MG/1
1 TABLET, FILM COATED ORAL
Refills: 0 | DISCHARGE

## 2025-04-30 RX ORDER — DEXTROSE 50 % IN WATER 50 %
12.5 SYRINGE (ML) INTRAVENOUS ONCE
Refills: 0 | Status: DISCONTINUED | OUTPATIENT
Start: 2025-04-30 | End: 2025-05-01

## 2025-04-30 RX ORDER — BISACODYL 5 MG
10 TABLET, DELAYED RELEASE (ENTERIC COATED) ORAL DAILY
Refills: 0 | Status: DISCONTINUED | OUTPATIENT
Start: 2025-04-30 | End: 2025-05-01

## 2025-04-30 RX ORDER — CIPROFLOXACIN HCL 250 MG
1 TABLET ORAL
Qty: 0 | Refills: 0 | DISCHARGE
Start: 2025-04-30

## 2025-04-30 RX ORDER — DEXTROSE 50 % IN WATER 50 %
25 SYRINGE (ML) INTRAVENOUS ONCE
Refills: 0 | Status: DISCONTINUED | OUTPATIENT
Start: 2025-04-30 | End: 2025-05-01

## 2025-04-30 RX ORDER — MELATONIN 5 MG
1 TABLET ORAL
Qty: 0 | Refills: 0 | DISCHARGE
Start: 2025-04-30

## 2025-04-30 RX ORDER — METOPROLOL SUCCINATE 50 MG/1
1 TABLET, EXTENDED RELEASE ORAL
Qty: 0 | Refills: 0 | DISCHARGE
Start: 2025-04-30

## 2025-04-30 RX ORDER — INSULIN LISPRO 100 U/ML
0 INJECTION, SOLUTION INTRAVENOUS; SUBCUTANEOUS
Qty: 0 | Refills: 0 | DISCHARGE
Start: 2025-04-30

## 2025-04-30 RX ORDER — METOCLOPRAMIDE HCL 10 MG
1 TABLET ORAL
Qty: 0 | Refills: 0 | DISCHARGE
Start: 2025-04-30

## 2025-04-30 RX ORDER — AMLODIPINE BESYLATE 10 MG/1
1 TABLET ORAL
Refills: 0 | DISCHARGE

## 2025-04-30 RX ADMIN — METOPROLOL SUCCINATE 25 MILLIGRAM(S): 50 TABLET, EXTENDED RELEASE ORAL at 06:32

## 2025-04-30 RX ADMIN — LISINOPRIL 40 MILLIGRAM(S): 5 TABLET ORAL at 06:31

## 2025-04-30 RX ADMIN — Medication 5 MILLIGRAM(S): at 06:31

## 2025-04-30 RX ADMIN — DONEPEZIL HYDROCHLORIDE 5 MILLIGRAM(S): 5 TABLET ORAL at 22:21

## 2025-04-30 RX ADMIN — AMLODIPINE BESYLATE 10 MILLIGRAM(S): 10 TABLET ORAL at 06:32

## 2025-04-30 RX ADMIN — INSULIN GLARGINE-YFGN 12 UNIT(S): 100 INJECTION, SOLUTION SUBCUTANEOUS at 08:33

## 2025-04-30 RX ADMIN — METOPROLOL SUCCINATE 25 MILLIGRAM(S): 50 TABLET, EXTENDED RELEASE ORAL at 22:21

## 2025-04-30 RX ADMIN — Medication 5 MILLIGRAM(S): at 22:22

## 2025-04-30 RX ADMIN — MIRTAZAPINE 7.5 MILLIGRAM(S): 30 TABLET, FILM COATED ORAL at 22:21

## 2025-04-30 RX ADMIN — APIXABAN 5 MILLIGRAM(S): 2.5 TABLET, FILM COATED ORAL at 06:32

## 2025-04-30 RX ADMIN — Medication 5 MILLIGRAM(S): at 14:22

## 2025-04-30 RX ADMIN — INSULIN LISPRO 1: 100 INJECTION, SOLUTION INTRAVENOUS; SUBCUTANEOUS at 08:32

## 2025-04-30 RX ADMIN — Medication 500 MILLIGRAM(S): at 06:31

## 2025-04-30 NOTE — PROGRESS NOTE ADULT - SUBJECTIVE AND OBJECTIVE BOX
[facility]                             Nephrology and Hypertension                             Geovani Branch                                          [phone]     Patient is a 74y old  Female who presents with a chief complaint of Hyperglycemia (26 Apr 2025 19:48)       HPI:  74-year-old white female  presently residing in rehab center with history of diabetes mellitus on insulin hypertension and hyperlipidemia who had developed vascular ulcers/gangrene left lower extremity a and he was evaluated as outpatient by vascular surgeon scheduled for surgery today and because of glucose being out of control was sent to the emergency department here at [place] for further evaluation care treatment and management as well as stabilization of patient from a metabolic and glucose standpoint.  Patient states that on a good day her sugar runs 180-200.  No recent fever chills nausea vomiting or diarrhea.    Renal consulted on 4/27/25 for persistent hypokalemia    No new complaints       PAST MEDICAL & SURGICAL HISTORY:  DM (diabetes mellitus)      Multiple sclerosis      DM (diabetes mellitus)      HTN (hypertension)      No significant past surgical history           FAMILY HISTORY:  NC    Social History:Non smoker    MEDICATIONS  (STANDING):  amLODIPine   Tablet 5 milliGRAM(s) Oral daily  apixaban 5 milliGRAM(s) Oral every 12 hours  aspirin  chewable 81 milliGRAM(s) Oral daily  atorvastatin 20 milliGRAM(s) Oral at bedtime  ciprofloxacin     Tablet 500 milliGRAM(s) Oral every 12 hours  dextrose 5%. 1000 milliLiter(s) (50 mL/Hr) IV Continuous <Continuous>  dextrose 5%. 1000 milliLiter(s) (100 mL/Hr) IV Continuous <Continuous>  dextrose 50% Injectable 25 Gram(s) IV Push once  dextrose 50% Injectable 12.5 Gram(s) IV Push once  dextrose 50% Injectable 25 Gram(s) IV Push once  donepezil 5 milliGRAM(s) Oral at bedtime  glucagon  Injectable 1 milliGRAM(s) IntraMuscular once  insulin glargine Injectable (LANTUS) 15 Unit(s) SubCutaneous before breakfast  insulin lispro (ADMELOG) corrective regimen sliding scale   SubCutaneous three times a day before meals  insulin lispro (ADMELOG) corrective regimen sliding scale   SubCutaneous at bedtime  lactated ringers 1000 milliLiter(s) (60 mL/Hr) IV Continuous <Continuous>  lisinopril 40 milliGRAM(s) Oral daily  metoprolol succinate ER 50 milliGRAM(s) Oral daily  mirtazapine 7.5 milliGRAM(s) Oral <User Schedule>  saccharomyces boulardii 250 milliGRAM(s) Oral two times a day    MEDICATIONS  (PRN):  acetaminophen     Tablet .. 650 milliGRAM(s) Oral every 6 hours PRN Temp greater or equal to 38C (100.4F), Mild Pain (1 - 3)  dextrose Oral Gel 15 Gram(s) Oral once PRN Blood Glucose LESS THAN 70 milliGRAM(s)/deciliter  dextrose Oral Gel 15 Gram(s) Oral once PRN Blood Glucose LESS THAN 70 milliGRAM(s)/deciliter  melatonin 3 milliGRAM(s) Oral at bedtime PRN Insomnia  ondansetron Injectable 4 milliGRAM(s) IV Push every 6 hours PRN Nausea and/or Vomiting   Meds reviewed    Allergies    No Known Allergies    Intolerances         REVIEW OF SYSTEMS:    Review of Systems:   as above      Vital Signs Last 24 Hrs  T(C): 36.9 (30 Apr 2025 04:15), Max: 37.1 (29 Apr 2025 20:04)  T(F): 98.4 (30 Apr 2025 04:15), Max: 98.7 (29 Apr 2025 20:04)  HR: 98 (30 Apr 2025 04:15) (66 - 98)  BP: 121/68 (30 Apr 2025 04:15) (114/69 - 127/67)  BP(mean): --  RR: 18 (30 Apr 2025 04:15) (17 - 18)  SpO2: 98% (30 Apr 2025 04:15) (98% - 100%)    Parameters below as of 30 Apr 2025 04:15  Patient On (Oxygen Delivery Method): room air          PHYSICAL EXAM:    GENERAL: NAD  HEAD:  Atraumatic, Normocephalic  EYES: EOMI, conjunctiva and sclera clear  ENMT: No Drainage from nares, No drainage from ears  NECK: Supple, neck  veins full  NERVOUS SYSTEM:  Awake and Alert  EXTREMITIES:  No Edema  SKIN: No rashes No obvious ecchymosis      LABS:                            9.8    10.44 )-----------( 341      ( 29 Apr 2025 07:18 )             29.6     04-29    145  |  111[H]  |  11  ----------------------------<  122[H]  3.7   |  26  |  0.45[L]    Ca    8.4[L]      29 Apr 2025 07:18  Mg     1.9     04-29        Urinalysis Basic - ( 29 Apr 2025 07:18 )    Color: x / Appearance: x / SG: x / pH: x  Gluc: 122 mg/dL / Ketone: x  / Bili: x / Urobili: x   Blood: x / Protein: x / Nitrite: x   Leuk Esterase: x / RBC: x / WBC x   Sq Epi: x / Non Sq Epi: x / Bacteria: x

## 2025-04-30 NOTE — PROGRESS NOTE ADULT - NS ATTEND AMEND GEN_ALL_CORE FT
74-year-old white female  presently residing in rehab center with history of diabetes mellitus on insulin hypertension and hyperlipidemia who had developed vascular ulcers/gangrene left lower extremity, admitted for infection, consulted for new onset Afib     - Presented with fever, Covid PNA. Called for ? new onset A fib   - EKG on 4/23 showed Possible A fib vs PAT. , tele with brief pat   - Holter Monitor 8/2022: Sinus with PACs, no significant events   - Continue Eliquis for Embolic CVA   - Continue BB  - Continue aspirin and statin   - Continue Lisinopril and bb   - COVID management per primary
74-year-old white female  presently residing in rehab center with history of diabetes mellitus on insulin hypertension and hyperlipidemia who had developed vascular ulcers/gangrene left lower extremity, admitted for infection, consulted for new onset Afib     - Presented with fever, Covid PNA. Called for ? new onset A fib   - EKG (4/23) Possible A fib vs PAT and while on tele was SR brief PAT,  - Holter Monitor 8/2022: Sinus with PACs, no significant events   - Continue Eliquis for Embolic CVA   - Continue BB, CCB, ACEi    - No evidence of any active ischemia   - Stress Testing (8/2022) Normal study, no EKG changes or chest pain, LVEF 65%   - Continue ASA and statin   - Technically difficult ECHO showed normal LV & RV size and function EF 64%, Mild MR and TR, Grade 1 DD    - +COVID, iso and management per primary
I have personally seen and examined the patient in detail.  I have spoken to the provider regarding the assessment and plan of care.  I have made changes to the note accordingly.    74-year-old white female  presently residing in rehab center with history of diabetes mellitus on insulin hypertension and hyperlipidemia who had developed vascular ulcers/gangrene left lower extremity, admitted for infection, consulted for new onset Afib     - Presented with fever, Covid PNA. Called for ? new onset A fib   - EKG on 4/23 showed Possible A fib vs PAT.   - Pt has been NSR since initiation of monitoring.   - Tele w/  SR 70-100s, PVC. Had nonsustained PAT episodes   - Holter Monitor 8/2022: Sinus with PACs, no significant events   - Continue Eliquis for Embolic CVA   - Continue BB    - EKG: A fib vs  bpm   - No evidence of any active ischemia   - Stress Testing 8/2022: Normal study, no EKG changes or chest pain, LVEF 65%   - Continue aspirin and statin     - Technically difficult ECHO showed normal LV & RV size and function EF 64%, Mild MR and TR, Grade 1 DD  - No evidence of any meaningful volume overload
74-year-old white female  presently residing in rehab center with history of diabetes mellitus on insulin hypertension and hyperlipidemia who had developed vascular ulcers/gangrene left lower extremity, admitted for infection, consulted for new onset Afib     - Presented with fever, Covid PNA. Called for ? new onset A fib   - EKG on 4/23 showed Possible A fib vs PAT. , tele with brief pat   - Holter Monitor 8/2022: Sinus with PACs, no significant events   - Continue Eliquis for Embolic CVA   - Continue BB    - EKG: A fib vs  bpm   - No evidence of any active ischemia   - Stress Testing 8/2022: Normal study, no EKG changes or chest pain, LVEF 65%   - Continue aspirin and statin     - Technically difficult ECHO showed normal LV & RV size and function EF 64%, Mild MR and TR, Grade 1 DD  - No evidence of any meaningful volume overload     - BP stable   - Continue amlodipine, Lisinopril and bb     Anemia per primary     - COVID management per primary
Patient was evaluated at the bedside.  The patient presented to the hospital yesterday for elective left lower extremity angiogram however she has COVID and elevated glucose.  She appears to be chronically nonambulatory with extensive wounds in the left lower extremity.  Salvageability of the limb is at this point questionable. Also the patient might not realistically benefit from revascularization with extensive debridement since essentially she is not ambulatory. Patient might require amputation.  Will continue to follow.  No plans for acute surgical intervention at this point.
I have personally seen and examined the patient in detail.  I have spoken to the provider regarding the assessment and plan of care.  I have made changes to the note accordingly.    74-year-old white female  presently residing in rehab center with history of diabetes mellitus on insulin hypertension and hyperlipidemia who had developed vascular ulcers/gangrene left lower extremity, admitted for infection, consulted for new onset Afib     - Presented with fever, Covid PNA. Called for ? new onset A fib   - EKG on 4/23 showed Possible A fib vs PAT.   - Pt has been NSR since initiation of monitoring.   - Tele w/  SR 80-100s, PVC. Had nonsustained PAT episodes   - Holter Monitor 8/2022: Sinus with PACs, no significant events   - Continue Eliquis for Embolic CVA   - Continue BB    - EKG: A fib vs  bpm   - No evidence of any active ischemia   - Stress Testing 8/2022: Normal study, no EKG changes or chest pain, LVEF 65%   - Continue aspirin and statin     - Technically difficult ECHO showed normal LV & RV size and function EF 64%, Mild MR and TR, Grade 1 DD  - No evidence of any meaningful volume overload
74-year-old white female  presently residing in rehab center with history of diabetes mellitus on insulin hypertension and hyperlipidemia who had developed vascular ulcers/gangrene left lower extremity, admitted for infection, consulted for new onset Afib     - Presented with fever, Covid PNA. Called for ? new onset A fib   - EKG on 4/23 showed Possible A fib vs PAT.   - Pt has been NSR since initiation of monitoring.   - Tele w/  Sr 70-100s , nonsustained PAT 130s, 140s, 160s x 1   - Holter Monitor 8/2022: Sinus with PACs, no significant events   - Continue Eliquis for Embolic CVA   - Continue BB    - EKG: A fib vs  bpm   - No evidence of any active ischemia   - Stress Testing 8/2022: Normal study, no EKG changes or chest pain, LVEF 65%   - Continue aspirin and statin     - Technically difficult ECHO showed normal LV & RV size and function EF 64%, Mild MR and TR, Grade 1 DD  - No evidence of any meaningful volume overload     - BP stable and controlled with -170s   - Continue Lisinopril 40     - COVID management per primary   - Monitor and replete lytes, keep K>4, Mg>2.  - Will continue to follow.

## 2025-04-30 NOTE — DISCHARGE NOTE PROVIDER - CARE PROVIDER_API CALL
Eulalio Moncada  Gastroenterology  [address], [address] [place] [address]  Phone: [phone]  Fax: [phone]  Established Patient  Follow Up Time: 2 weeks    Shaun Burden  Vascular Surgery  [address] [place] [address], Floor [address]  [place] [address]  Phone: [phone]  Fax: [phone]  Established Patient  Follow Up Time: 2 weeks    Kerwin White  Wound Care  [address] [place] [address]  Phone: [phone]  Fax: [phone]  Follow Up Time: Routine

## 2025-04-30 NOTE — PROGRESS NOTE ADULT - ASSESSMENT
74-year-old white female  presently residing in rehab center with history of diabetes mellitus on insulin hypertension and hyperlipidemia who had developed vascular ulcers/gangrene left lower extremity, admitted for infection, consulted for new onset Afib     - Presented with fever, Covid PNA. Called for ? new onset A fib   - EKG (4/23) Possible A fib vs PAT and while on tele was SR brief PAT,  - Holter Monitor 8/2022: Sinus with PACs, no significant events   - Continue Eliquis for Embolic CVA   - BP stable and controlled per flow sheet   - Continue BB, CCB, ACEi  - Monitor and replete Lytes. Keep K > 4 and Mg > 2    - EKG: Afib vs  bpm   - No evidence of any active ischemia   - Stress Testing (8/2022) Normal study, no EKG changes or chest pain, LVEF 65%   - Continue ASA and statin     - Technically difficult ECHO showed normal LV & RV size and function EF 64%, Mild MR and TR, Grade 1 DD  - No evidence of any meaningful volume overload   - Non orthopneic on room air     - +COVID, iso and management per primary   - DC planning to [facility]   - Will continue to follow.    Tegan Guerrero [facility]  Nurse Practitioner - Cardiology   call TEAMS

## 2025-04-30 NOTE — PROGRESS NOTE ADULT - SUBJECTIVE AND OBJECTIVE BOX
[facility]   INFECTIOUS DISEASES   [address] [place] [address]  Tel: [phone]     Fax: [phone]  =========================================================  MD Blayne Bell Kaushal, MD Cho, Michelle, MD Sunjit, Jaspal, MD  =========================================================    [facility]A [facility] [id number]    Follow up: Left lower leg ulcers. No new changes. NAD lying in bed, not on o2.   No fever     Allergies:  No Known Allergies    MEDICATIONS  (STANDING):  amLODIPine   Tablet 10 milliGRAM(s) Oral daily  apixaban 5 milliGRAM(s) Oral every 12 hours  aspirin  chewable 81 milliGRAM(s) Oral daily  atorvastatin 20 milliGRAM(s) Oral at bedtime  ciprofloxacin     Tablet 500 milliGRAM(s) Oral every 12 hours  dextrose 5%. 1000 milliLiter(s) (100 mL/Hr) IV Continuous <Continuous>  dextrose 5%. 1000 milliLiter(s) (50 mL/Hr) IV Continuous <Continuous>  dextrose 50% Injectable 25 Gram(s) IV Push once  dextrose 50% Injectable 12.5 Gram(s) IV Push once  dextrose 50% Injectable 25 Gram(s) IV Push once  donepezil 5 milliGRAM(s) Oral at bedtime  glucagon  Injectable 1 milliGRAM(s) IntraMuscular once  insulin glargine Injectable (LANTUS) 10 Unit(s) SubCutaneous before breakfast  insulin lispro (ADMELOG) corrective regimen sliding scale   SubCutaneous three times a day before meals  insulin lispro (ADMELOG) corrective regimen sliding scale   SubCutaneous at bedtime  lisinopril 40 milliGRAM(s) Oral daily  metoclopramide 5 milliGRAM(s) Oral three times a day  metoprolol succinate ER 50 milliGRAM(s) Oral daily  mirtazapine 7.5 milliGRAM(s) Oral <User Schedule>  pantoprazole    Tablet 40 milliGRAM(s) Oral two times a day  saccharomyces boulardii 250 milliGRAM(s) Oral two times a day     REVIEW OF SYSTEMS:  REVIEW OF SYSTEMS:  CONSTITUTIONAL:  No Fever or chills  HEENT:   No diplopia or blurred vision.  No earache, sore throat   CARDIOVASCULAR:  No chest pain or SOB  RESPIRATORY: + cough, no shortness of breath, PND or orthopnea.  GASTROINTESTINAL:  No nausea, vomiting or diarrhea.  GENITOURINARY:  No dysuria, frequency or urgency. No Blood in urine  SKIN:  leg wound     Physical Exam:  Vital Signs Last 24 Hrs  T(C): 36.8 (30 Apr 2025 12:37), Max: 37.1 (29 Apr 2025 20:04)  T(F): 98.3 (30 Apr 2025 12:37), Max: 98.7 (29 Apr 2025 20:04)  HR: 80 (30 Apr 2025 12:37) (66 - 98)  BP: 136/69 (30 Apr 2025 12:37) (114/69 - 136/69)  BP(mean): --  RR: 18 (30 Apr 2025 12:37) (17 - 18)  SpO2: 97% (30 Apr 2025 12:37) (97% - 100%)  Parameters below as of 30 Apr 2025 12:37  Patient On (Oxygen Delivery Method): room air  GEN: NAD  HEENT: normocephalic and atraumatic. EOMI. PERRL.    NECK: Supple.  No lymphadenopathy   LUNGS: Clear to auscultation.  HEART: Regular rate and rhythm   ABDOMEN: Soft, nontender, and nondistended.   EXTREMITIES: Without edema.  NEUROLOGIC: grossly intact.  PSYCHIATRIC: Appropriate affect .  SKIN: LLE with 2 areas of gangrenous ulcers, one on ankle on dorsal side and   larger one in posterolateral side of leg, some discharge under dry eschar     Labs:                        9.8    10.44 )-----------( 341      ( 29 Apr 2025 07:18 )             29.6     04-29    145  |  111[H]  |  11  ----------------------------<  122[H]  3.7   |  26  |  0.45[L]    Ca    8.4[L]      29 Apr 2025 07:18  Mg     1.9     04-29    Culture - Urine (collected 04-22-25 @ 03:30)  Source: Catheterized Catheterized  Final Report (04-24-25 @ 12:37):    10,000 - 49,000 CFU/mL Candida tropicalis "Susceptibilities not performed"    Culture - Wound Aerobic/Anaerobic (collected 04-21-25 @ 15:00)  Source: Swab Swab  Final Report (04-26-25 @ 15:27):    Moderate Pseudomonas aeruginosa  Organism: Pseudomonas aeruginosa (04-26-25 @ 15:27)  Organism: Pseudomonas aeruginosa (04-26-25 @ 15:27)    Sensitivities:      -  Levofloxacin: S <=0.5      -  Aztreonam: R >16      -  Cefepime: R >16      -  Piperacillin/Tazobactam: R >64      -  Ciprofloxacin: S <=0.25      -  Imipenem: S <=1      Method Type: HIMA      -  Meropenem: S <=1      -  Ceftazidime: R >16    Culture - Blood (collected 04-21-25 @ 09:15)  Source: Blood Blood-Peripheral  Final Report (04-26-25 @ 16:00):    No growth at 5 days    Culture - Blood (collected 04-21-25 @ 09:10)  Source: Blood Blood-Peripheral  Final Report (04-26-25 @ 16:00):    No growth at 5 days    WBC Count: 10.44 K/uL (04-29-25 @ 07:18)  WBC Count: 10.44 K/uL (04-28-25 @ 07:20)  WBC Count: 9.98 K/uL (04-27-25 @ 08:50)  WBC Count: 8.53 K/uL (04-26-25 @ 09:19)    Creatinine: 0.45 mg/dL (04-29-25 @ 07:18)  Creatinine: 0.59 mg/dL (04-28-25 @ 07:20)  Creatinine: 0.57 mg/dL (04-27-25 @ 08:50)  Creatinine: 0.54 mg/dL (04-26-25 @ 09:19)    C-Reactive Protein: 36 mg/L (04-21-25 @ 09:15)    Sedimentation Rate, Erythrocyte: 65 mm/hr (04-23-25 @ 06:45)  Sedimentation Rate, Erythrocyte: 70 mm/hr (04-21-25 @ 09:15)    COVID-19 PCR: Detected (04-21-25 @ 23:50)    All imaging and data are reviewed.   < from: [facility] Duplex Lower Ext Vein Scan, Bashir (04.21.25 @ 14:14) >  IMPRESSION:  No evidence of deep venous thrombosis in the visualized deep veins of   either lower extremity noted above.

## 2025-04-30 NOTE — PROGRESS NOTE ADULT - ASSESSMENT
74-year-old white female  presently residing in rehab center with history of diabetes mellitus on insulin hypertension and hyperlipidemia who had developed vascular ulcers/gangrene left lower extremity a and he was evaluated as outpatient by vascular surgeon scheduled for surgery today and because of glucose being out of control was sent to the emergency department here at [place] for further evaluation care treatment and management as well as stabilization of patient from a metabolic and glucose standpoint. Palliative consulted to assist with goals of care given high risk of limb loss in a patient with significant debility.

## 2025-04-30 NOTE — DISCHARGE NOTE PROVIDER - PROVIDER TOKENS
PROVIDER:[TOKEN:[[id number]],FOLLOWUP:[2 weeks],ESTABLISHEDPATIENT:[T]],PROVIDER:[TOKEN:[[id number]],FOLLOWUP:[2 weeks],ESTABLISHEDPATIENT:[T]],PROVIDER:[TOKEN:[[id number]],FOLLOWUP:[Routine]]

## 2025-04-30 NOTE — PROGRESS NOTE ADULT - ASSESSMENT
73yo woman with PMH of HTN and DM2, presently residing in rehab center had developed vascular ulcers/gangrene left lower extremity a and she was evaluated as outpatient by vascular surgeon scheduled for surgery today and because of glucose being out of control was sent to the emergency department here at [place] for further evaluation, treatment and management as well as stabilization of patient from a metabolic and glucose standpoint. No recent fever chills nausea vomiting or diarrhea.  She had leukocytosis of 15k in ED but no fever.   ESR 70 and CRP 36     She tested positive for COVID in ED even though no symptoms, not on o2 and CXR negative for pneumonia. Unclear when had covid but since asymptomatic can be watched.   Leg wound could be vascular with gangrene, decubitus ulcer is a possibility too.     # Vascular ulcers in LLE  # Uncontrolled Diabetes mellitus   # COVID    - Blood Cultures NGTD  - Wound culture with pseudomonas, sensitivity demonstrating resistance to zosyn  - Stopped zosyn and started on Ciprofloxacin (possibly needs 2 weeks, 5/4/25 would be the last day)  - One dose of fluconazole 150mg for possible candida vv, also candida in urine.   - Monitor WBC, normal   - Vascular and wound care follow up after discharge   - MRSA PCR positive s/p 5days of mupirocin   - For COVID no remdesivir or steroid given   - AC as per protocol     Will sign off please call with any question.     Gregory Huitron MD  Division of Infectious Diseases   Please call ID service at [phone] with any question.    50 minutes spent on total encounter assessing patient, examination, chart review, counseling and coordinating care by the attending physician/nurse/care manager.

## 2025-04-30 NOTE — DISCHARGE NOTE PROVIDER - NSDCMRMEDTOKEN_GEN_ALL_CORE_FT
acetaminophen 325 mg oral capsule: 1 cap(s) orally once a day administer 30 minutes prior to wound care, keep 4 hours apart from prn  acetaminophen 325 mg oral tablet: 2 tab(s) orally every 8 hours as needed for  moderate pain  amLODIPine 10 mg oral tablet: 1 tab(s) orally once a day (in the morning)  apixaban 5 mg oral tablet: 1 tab(s) orally 2 times a day for Dx DVT  Aricept 5 mg oral tablet: 1 tab(s) orally once a day (at bedtime)  aspirin 81 mg oral tablet, chewable: 1 tab(s) chewed once a day  atorvastatin 20 mg oral tablet: 1 tab(s) orally once a day (at bedtime)  ciprofloxacin 500 mg oral tablet: 1 tab(s) orally every 12 hours  Dulcolax Laxative 10 mg rectal suppository: 1 suppository(ies) rectally once a day as needed for  constipation administer during day shift if no relief from MOM, if ineffective by evening give Fleet Enema  furosemide 20 mg oral tablet: 1 tab(s) orally once a day  Imodium 2 mg oral capsule: 1 cap(s) orally once a day as needed for  diarrhea  insulin lispro 100 units/mL injectable solution: injectable 4 times a day (before meals and at bedtime) low dose sliding scale  lactobacillus acidophilus oral capsule: 1 cap(s) orally 3 times a day (with meals) for up to 2 weeks after antibiotics complete  Lantus 100 units/mL subcutaneous solution: 15 unit(s) subcutaneous once a day (in the morning)  magnesium hydroxide 1200 mg/15 mL oral liquid: 30 milliliter(s) orally once a day as needed for  constipation if no BM for 3 days give in evening, if ineffective by next day proceed to Bisacodyl Suppository  melatonin 3 mg oral tablet: 1 tab(s) orally once a day (at bedtime) As needed Insomnia  metoclopramide 5 mg oral tablet: 1 tab(s) orally 3 times a day (for 2 weeks)  metoprolol tartrate 25 mg oral tablet: 1 tab(s) orally 2 times a day  mirtazapine 7.5 mg oral tablet: 1 tab(s) orally once a day (at bedtime)  pantoprazole 40 mg oral delayed release tablet: 1 tab(s) orally 2 times a day  potassium chloride 20 mEq oral tablet, extended release: 1 tab(s) orally once a day (in the morning)  ramipril 10 mg oral capsule: 1 cap(s) orally once a day   amLODIPine 10 mg oral tablet: 1 tab(s) orally once a day (in the morning)  Aricept 5 mg oral tablet: 1 tab(s) orally once a day (at bedtime)  Dulcolax Laxative 10 mg rectal suppository: 1 suppository(ies) rectally once a day as needed for  constipation  glycopyrrolate 0.2 mg/mL injectable solution: 0.2 milligram(s) injectable every 6 hours as needed for  secretions  haloperidol: 0.5 milligram(s) intravenous every 6 hours as needed for  agitation  metoclopramide 5 mg/mL injectable solution: 5 milligram(s) injectable 3 times a day  metoprolol tartrate 25 mg oral tablet: 1 tab(s) orally 2 times a day  mirtazapine 7.5 mg oral tablet: 1 tab(s) orally once a day (at bedtime)  morphine: 2 milligram(s) intravenous every 2 hours as needed for  severe pain and/or dyspnea  morphine: 1 milligram(s) intravenous every 2 hours as needed for  moderate pain  morphine: 1 milligram(s) intravenous every 6 hours  pantoprazole 40 mg oral delayed release tablet: 1 tab(s) orally 2 times a day  ramipril 10 mg oral capsule: 1 cap(s) orally once a day

## 2025-04-30 NOTE — PROGRESS NOTE ADULT - SUBJECTIVE AND OBJECTIVE BOX
CAPILLARY BLOOD GLUCOSE      POCT Blood Glucose.: 136 mg/dL (30 Apr 2025 12:25)  POCT Blood Glucose.: 156 mg/dL (30 Apr 2025 08:18)  POCT Blood Glucose.: 157 mg/dL (29 Apr 2025 21:16)  POCT Blood Glucose.: 173 mg/dL (29 Apr 2025 17:05)      Vital Signs Last 24 Hrs  T(C): 36.8 (30 Apr 2025 12:37), Max: 37.1 (29 Apr 2025 20:04)  T(F): 98.3 (30 Apr 2025 12:37), Max: 98.7 (29 Apr 2025 20:04)  HR: 80 (30 Apr 2025 12:37) (74 - 98)  BP: 136/69 (30 Apr 2025 12:37) (114/69 - 136/69)  BP(mean): --  RR: 18 (30 Apr 2025 12:37) (18 - 18)  SpO2: 97% (30 Apr 2025 12:37) (97% - 100%)    Parameters below as of 30 Apr 2025 12:37  Patient On (Oxygen Delivery Method): room air        General: WN/WD NAD  Respiratory: CTA B/L  CV: RRR, S1S2, no murmurs, rubs or gallops  Abdominal: Soft, NT, ND +BS, Last BM  Extremities: No edema, + peripheral pulses     04-29    145  |  111[H]  |  11  ----------------------------<  122[H]  3.7   |  26  |  0.45[L]    Ca    8.4[L]      29 Apr 2025 07:18  Mg     1.9     04-29

## 2025-04-30 NOTE — PROGRESS NOTE ADULT - SUBJECTIVE AND OBJECTIVE BOX
CHIEF COMPLAINT/INTERVAL HISTORY:  Pt. seen and evaluated for left LE infected ulcer.  Pt. is lying in bed in no distress.  Opens her eyes to verbal and tactile stimuli.  Denies having pain or SOB by nodding.  Tolerating oral antibiotic.  Remains on room air with SpO2 in upper 90th%.      REVIEW OF SYSTEMS:  No fever, CP, SOB, or abdominal pain.      Vital Signs Last 24 Hrs  T(C): 36.9 (30 Apr 2025 04:15), Max: 37.1 (29 Apr 2025 20:04)  T(F): 98.4 (30 Apr 2025 04:15), Max: 98.7 (29 Apr 2025 20:04)  HR: 98 (30 Apr 2025 04:15) (66 - 98)  BP: 121/68 (30 Apr 2025 04:15) (114/69 - 127/67)  BP(mean): --  RR: 18 (30 Apr 2025 04:15) (17 - 18)  SpO2: 98% (30 Apr 2025 04:15) (98% - 100%)    Parameters below as of 30 Apr 2025 04:15  Patient On (Oxygen Delivery Method): room air        PHYSICAL EXAM:  GENERAL: NAD, frail  HEENT: EOMI, hearing normal, conjunctiva and sclera clear  Chest: CTA bilaterally, no wheezing  CV: S1S2, RRR,   GI: soft, +BS, NT/ND  Musculoskeletal: LLE wounds with dressing over foot  Psychiatric: flat affect  Skin: warm and dry    LABS:                        9.8    10.44 )-----------( 341      ( 29 Apr 2025 07:18 )             29.6     04-29    145  |  111[H]  |  11  ----------------------------<  122[H]  3.7   |  26  |  0.45[L]    Ca    8.4[L]      29 Apr 2025 07:18  Mg     1.9     04-29        Urinalysis Basic - ( 29 Apr 2025 07:18 )    Color: x / Appearance: x / SG: x / pH: x  Gluc: 122 mg/dL / Ketone: x  / Bili: x / Urobili: x   Blood: x / Protein: x / Nitrite: x   Leuk Esterase: x / RBC: x / WBC x   Sq Epi: x / Non Sq Epi: x / Bacteria: x

## 2025-04-30 NOTE — DISCHARGE NOTE PROVIDER - CARE PROVIDERS DIRECT ADDRESSES
,[email or web address]

## 2025-04-30 NOTE — DISCHARGE NOTE PROVIDER - DETAILS OF MALNUTRITION DIAGNOSIS/DIAGNOSES
This patient has been assessed with a concern for Malnutrition and was treated during this hospitalization for the following Nutrition diagnosis/diagnoses:     -  04/29/2025: Moderate protein-calorie malnutrition

## 2025-04-30 NOTE — PROGRESS NOTE ADULT - SUBJECTIVE AND OBJECTIVE BOX
Indication for Geriatrics and Palliative Care Services/INTERVAL HPI: goals of care, hospice discussion  SUBJECTIVE AND OBJECTIVE: pt seen, spouse at bedside. patient lethargic. poor PO intake    OVERNIGHT EVENTS: no acute overnight events    DNR on chart:DNI  DNI      Allergies    No Known Allergies    Intolerances    MEDICATIONS  (STANDING):  amLODIPine   Tablet 10 milliGRAM(s) Oral daily  donepezil 5 milliGRAM(s) Oral at bedtime  lisinopril 40 milliGRAM(s) Oral daily  metoclopramide Injectable 5 milliGRAM(s) IV Push three times a day  metoprolol tartrate 25 milliGRAM(s) Oral two times a day  mirtazapine 7.5 milliGRAM(s) Oral <User Schedule>  pantoprazole    Tablet 40 milliGRAM(s) Oral two times a day    MEDICATIONS  (PRN):  bisacodyl Suppository 10 milliGRAM(s) Rectal daily PRN Constipation  glycopyrrolate Injectable 0.2 milliGRAM(s) IV Push every 6 hours PRN secretions  haloperidol    Injectable 0.5 milliGRAM(s) IV Push every 6 hours PRN agitation  melatonin 3 milliGRAM(s) Oral at bedtime PRN Insomnia  morphine  - Injectable 2 milliGRAM(s) IV Push every 2 hours PRN Severe Pain (7 - 10)  morphine  - Injectable 2 milliGRAM(s) IV Push every 2 hours PRN dyspnea  morphine  - Injectable 1 milliGRAM(s) IV Push every 2 hours PRN Moderate Pain (4 - 6)  ondansetron Injectable 4 milliGRAM(s) IV Push every 6 hours PRN Nausea and/or Vomiting      ITEMS UNCHECKED ARE NOT PRESENT    PRESENT SYMPTOMS: [ x]Unable to self-report - see [ ] CPOT [ x] PAINADS [x ] RDOS  Source if other than patient:  [ ]Family   [ ]Team     Pain:  [ ]yes [ ]no  QOL impact -   Location -                    Aggravating factors -  Quality -  Radiation -  Timing-  Severity (0-10 scale):  Minimal acceptable level (0-10 scale):     CPOT:    https://www.[facility].org/getattachment/zhp90c58-2k3u-3c4u-9h9d-6251w5112n9x/Critical-Care-Pain-Observation-Tool-(CPOT)    Dyspnea:                           [ ]Mild [ ]Moderate [ ]Severe  Anxiety:                             [ ]Mild [ ]Moderate [ ]Severe  Fatigue:                             [ ]Mild [ ]Moderate [ ]Severe  Nausea:                             [ ]Mild [ ]Moderate [ ]Severe  Loss of appetite:              [ ]Mild [ ]Moderate [ ]Severe  Constipation:                    [ ]Mild [ ]Moderate [ ]Severe    PCSSQ[Palliative Care Spiritual Screening Question]   Severity (0-10):  Score of 4 or > indicate consideration of Chaplaincy referral.  Chaplaincy Referral: [ ] yes [ ] refused [ ] following [ x] Deferred     Caregiver [place]? : [ ] yes [x ] no [ ] Deferred [ ] Declined             Social work referral [ ] Patient & Family Centered Care Referral [ ]     Anticipatory Grief present?:  [ ] yes [ x] no  [ ] Deferred                  Social work referral [ ] Chaplaincy Referral[ ]      Other Symptoms:  [ ]All other review of systems negative   [x ]Unable to obtain due to poor mentation    Palliative Performance Status Version 2:    20     %      [email or web address]  PHYSICAL EXAM:  Vital Signs Last 24 Hrs  T(C): 36.8 (30 Apr 2025 12:37), Max: 37.1 (29 Apr 2025 20:04)  T(F): 98.3 (30 Apr 2025 12:37), Max: 98.7 (29 Apr 2025 20:04)  HR: 80 (30 Apr 2025 12:37) (66 - 98)  BP: 136/69 (30 Apr 2025 12:37) (114/69 - 136/69)  BP(mean): --  RR: 18 (30 Apr 2025 12:37) (17 - 18)  SpO2: 97% (30 Apr 2025 12:37) (97% - 100%)    Parameters below as of 30 Apr 2025 12:37  Patient On (Oxygen Delivery Method): room air     I&O's Summary    29 Apr 2025 07:01  -  30 Apr 2025 07:00  --------------------------------------------------------  IN: 90 mL / OUT: 425 mL / NET: -335 mL       GENERAL: [ ]Cachexia    [ ]Alert  [ ]Oriented x   [ x]Lethargic  [ ]Unarousable  [ ]Verbal  [ ]Non-Verbal  Behavioral:   [ ]Anxiety  [ ]Delirium [ ]Agitation [ ]Other  HEENT:  [x ]Normal   [ ]Dry mouth   [ ]ET Tube/Trach  [ ]Oral lesions  PULMONARY:   [ ]Clear [ ]Tachypnea  [ ]Audible excessive secretions   [ ]Rhonchi        [ ]Right [ ]Left [ ]Bilateral  [ ]Crackles        [ ]Right [ ]Left [ ]Bilateral  [ ]Wheezing     [ ]Right [ ]Left [ ]Bilateral  [ x]Diminished BS [ ] Right [ ]Left [ ]Bilateral  CARDIOVASCULAR:    [x ]Regular [ ]Irregular [ ]Tachy  [ ]Sheldon [ ]Murmur [ ]Other  GASTROINTESTINAL:  [ x]Soft  [ ]Distended   [ ]+BS  [ ]Non tender [ ]Tender  [ ]Other [ ]PEG [ ]OGT/ NGT   Last BM:   GENITOURINARY:  [ ]Normal [ x]Incontinent   [ ]Oliguria/Anuria   [ ][place]  MUSCULOSKELETAL:   [ ]Normal   [x ]Weakness  [ ]Bed/Wheelchair bound [ ]Edema  NEUROLOGIC:   [ ]No focal deficits  [ x] Cognitive impairment  [ x] Dysphagia [ ]Dysarthria [ ] Paresis [ ]Other   SKIN:   [ ]Normal  [ ]Rash  [ x]Other  [ x]Pressure ulcer(s) [xy [ ]n present on admission    CRITICAL CARE:  [ ]Shock Present  [ ]Septic [ ]Cardiogenic [ ]Neurologic [ ]Hypovolemic  [ ]Vasopressors [ ]Inotropes  [ ]Respiratory failure present [ ]Mechanical Ventilation [ ]Non-invasive ventilatory support [ ]High-Flow   [ ]Acute  [ ]Chronic [ ]Hypoxic  [ ]Hypercarbic [ ]Other  [ ]Other organ failure     LABS:                        9.8    10.44 )-----------( 341      ( 29 Apr 2025 07:18 )             29.6   04-29    145  |  111[H]  |  11  ----------------------------<  122[H]  3.7   |  26  |  0.45[L]    Ca    8.4[L]      29 Apr 2025 07:18  Mg     1.9     04-29        Urinalysis Basic - ( 29 Apr 2025 07:18 )    Color: x / Appearance: x / SG: x / pH: x  Gluc: 122 mg/dL / Ketone: x  / Bili: x / Urobili: x   Blood: x / Protein: x / Nitrite: x   Leuk Esterase: x / RBC: x / WBC x   Sq Epi: x / Non Sq Epi: x / Bacteria: x      RADIOLOGY & ADDITIONAL STUDIES: recent imaging reviewed    Protein Calorie Malnutrition Present: [ ]mild [ ]moderate [ ]severe [ ]underweight [ ]morbid obesity  https://www.andeal.org/vault/4840/web/files/ONC/Table_Clinical%20Characteristics%20to%20Document%20Malnutrition-White%20JV%20et%20al%568110.pdf    Height (cm): 160 (04-22-25 @ 08:34), 160 (04-21-25 @ 07:36)  Weight (kg): 56.7 (04-22-25 @ 08:34), 56.7 (04-21-25 @ 07:36)  BMI (kg/m2): 22.1 (04-22-25 @ 08:34), 22.1 (04-21-25 @ 07:36)    [ x]PPSV2 < or = 30%  [ ]significant weight loss [x ]poor nutritional intake [ ]anasarcaPrealbumin, Serum: 9 mg/dL (04-23-25 @ 06:45)  [ ]Artificial Nutrition    Other REFERRALS:  [ ]Hospice  [ ]Child Life  [x ]Social Work  [ ]Case management [ ]Holistic Therapy     Goals of Care Document:

## 2025-04-30 NOTE — PROGRESS NOTE ADULT - ASSESSMENT
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 Found to have paroxysmal atrial fibrillation, now back in SR.    #Infected LLE Ulcers  #Cellulitis/SSTI  #PAD  - wound cx with pseudomonas - sensitivities noted - resistant to zosyn - changed to ciprofloxacin - QTC noted - no plans for vascular intervention.  Continue course of Cipro until 5/4  - sepsis POA (evolving) secondary to SSTI & COVID - now resolved  - BCx negative. UCx with candida tropicalis  - per vascular- no acute intervention now  - continue AC/aspirin + statin  - ID following Dr. Huitron/Blayne  - Vascular following Dr. Burden  - Family requesting palliative care with interest in hospice.     #Nausea  #Failure to thrive/Malnutrition  #Depression  - per  report - nausea is not new - has been dealing with this for a while  -  reports patient takes a pill before eating  - suspect pt with gastroparesis and now with worsening symptoms due to COVID  - will trial some standing reglan + PPI BID -->improved  - downgrade diet to pureed with mildly thickened liquids - SLP evaluation noted  - continue nutrition supplements  - will continue remeron    #COVID infection  - no role for remdesivir/decadron  - supportive care  - on room air with SpO2 in upper 90th% to 100%  - ID following    #Atrial fibrillation, now in SR  #Paroxsymal Atrial Tachycardia  - Afib with RVR vs PAT 4/23 AM likely reactive secondary to sepsis  - Reviewed initial EKG from 4/23 - d/w cardiology 4/25 - question if p wave present so possible this was PAT but cannot rule out atrial fibrillation  - unfortunately was placed on telemetry monitoring hours after initial EKG - has been SR since start of telemetry monitoring - regardless, has been having bursts of tachycardia & already on AC for history of embolic CVA, so will treat as such given current information & clinical status  - patient without documented history of afib although per outpatient review, had embolic CVA with suspected cardiogenic etiology  - suspect patient with paroxysmal atrial fibrillation with extreme stress i.e 4/23 with fever & sepsis  - continue home apixaban  - continue metoprolol 25mg PO BID  - TTE reviewed - normal EF, mild valvular regurgitations, mild DD  - cardiology consult Dr. Lanza appreciated    #DM1, uncontrolled  - A1C 7.8  - had hypoglycemia due to poor PO  - continue 12u lantus qAM  - continue low dose insulin corrective scale  - monitor BG, hypoglycemia protocol  - endocrine follow up    #Electrolyte abnormality  - improved  - monitor and replete lytes    #HTN  - BP well controlled  - continue home dose amlodipine 10mg daily  - continue lisinopril 40mg  - continue metoprolol 25mg PO BID  - hold lasix for now  - monitor HDs    #History of embolic CVA  - continue apixaban + statin    #Alzheimer Dementia  - continue donepezil    #Prophylactic Measure  - VTE ppx: apixaban    GOC: DNR/DNI    ---  TIME SPENT:  52 minutes spent on total encounter. The necessity of the time spent during the encounter on this date of service was due to:     direct patient care including but not limited to reviewing chart, medications ,laboratory data, imaging reports, discussion of plan of care with consultants on the case, coordination of care with multidisciplinary team involved in the case and discussion of plan with patient.  Patient and family agreeable to plan of care and verbalized understanding the anticipated hospital course and treatment plan.    ---

## 2025-04-30 NOTE — DISCHARGE NOTE PROVIDER - NSDCCAREPROVSEEN_GEN_ALL_CORE_FT
Spencer Polanco (hospitalist)  Nvaid Plummer (hospitalist)  Garrett Lanza (cardiologist)  Perlman, Craig D (endocrinologist)  Khanh Mead (vascular)  Gregory Huitron (infectious disease  YangMike osborn (nephrology)  Shaun Burden (vascular)

## 2025-04-30 NOTE — DISCHARGE NOTE PROVIDER - NSDCCPCAREPLAN_GEN_ALL_CORE_FT
PRINCIPAL DISCHARGE DIAGNOSIS  Diagnosis: Type 1 diabetes mellitus  Assessment and Plan of Treatment: initially came with hyperglycemia. had poor PO intake with hypoglycemia and insulin regimen was adjusted as needed in accordance to patient's oral intake.. follow up with endocrinology.      SECONDARY DISCHARGE DIAGNOSES  Diagnosis: Multiple wounds of skin  Assessment and Plan of Treatment: Left lower extremity with vascular and pressure wounds. Also sacral woundsPlease clean and dress regularly. For other wounds, local care. Offload pressure, turn and position as needed    Diagnosis: Paroxysmal atrial tachycardia  Assessment and Plan of Treatment: EKG with potential atrial fibrillation vs PAT. Telemetry monitoring showed no atrial fibrillation since placed on the monitor. Continue metoprol for rate contirol. Continue your home eliquis (apixaban) foir stroke prevention, Follow up with cardiology.    Diagnosis: Alzheimer disease  Assessment and Plan of Treatment: Continue aricept. Frequent orientation. Assistance with all ADLs.    Diagnosis: PAD (peripheral artery disease)  Assessment and Plan of Treatment: follow up with vascular surgery when physically improved. continue plavix & statin.    Diagnosis: HTN (hypertension)  Assessment and Plan of Treatment: Continue your home regimen. metoprolol 25 mg twice a day added for heart rate. BP stablle on current regimen.     PRINCIPAL DISCHARGE DIAGNOSIS  Diagnosis: Type 1 diabetes mellitus  Assessment and Plan of Treatment: initially came with hyperglycemia. had poor PO intake with hypoglycemia and insulin regimen was adjusted as needed in accordance to patient's oral intake.  You were seen by palliative care and now on comfort measures.  You will be discharge to inpatient hospice.      SECONDARY DISCHARGE DIAGNOSES  Diagnosis: Multiple wounds of skin  Assessment and Plan of Treatment: Left lower extremity with vascular and pressure wounds. Also sacral woundsPlease clean and dress regularly. For other wounds, local care. Offload pressure, turn and position as needed.  You will be discharge to inpatient hospice.    Diagnosis: Alzheimer disease  Assessment and Plan of Treatment: Continue aricept. Frequent orientation. Assistance with all ADLs.  Continue comfort measures/hospice care.    Diagnosis: PAD (peripheral artery disease)  Assessment and Plan of Treatment: Continue comfort measures.    Diagnosis: Paroxysmal atrial tachycardia  Assessment and Plan of Treatment: EKG with potential atrial fibrillation vs PAT. Telemetry monitoring showed no atrial fibrillation since placed on the monitor. Continue metoprol for rate contirol.  Continue comfort measures and hospice care.    Diagnosis: HTN (hypertension)  Assessment and Plan of Treatment: Continue your home regimen. metoprolol 25 mg twice a day added for heart rate. BP stablle on current regimen.

## 2025-04-30 NOTE — DISCHARGE NOTE PROVIDER - HOSPITAL COURSE
ADMISSION DATE:  04-21-25    ---  FROM ADMISSION H+P:   HPI:  74-year-old white female  presently residing in rehab center with history of diabetes mellitus on insulin hypertension and hyperlipidemia who had developed vascular ulcers/gangrene left lower extremity a and he was evaluated as outpatient by vascular surgeon scheduled for surgery today and because of glucose being out of control was sent to the emergency department here at [place] for further evaluation care treatment and management as well as stabilization of patient from a metabolic and glucose standpoint.  Patient states that on a good day her sugar runs 180-200.  No recent fever chills nausea vomiting or diarrhea.  ED COURSE:  Vitals: T 97.7  F , 90 HR  ,  /47 , RR 16 , SpO2  100% on RA  Labs significant for: wbc 15.92, ,   Imaging: No acute infilitrate on chest x ray   EKG: NSR, qtc 430  Pt received: 10u admelog, zosyn 3.375 g, vanc 1g, LR 1L   (21 Apr 2025 12:55)      ---  HOSPITAL COURSE/PERTINENT LABS/PROCEDURES PERFORMED/PENDING TESTS:  Patient was admitted for uncontrolled dm1 with hyperglycemia and infected LLE wounds. Treated with intravenous zosyn. Wound culture showed Pseudomonas resistant to zosyn and antibiotics changed to ciprofloxacin for a 2 week course. Vascular evaluated patient and recommended no acute intervention, follow up when functional status better. Had COVID infection with no hypoxemia or symptoms. Had brief afib vs PAT - given embolic strokes in the past, recommended to follow up with cardlogy & GI Already on eliquis, started on beta blocker. Patient then complaining of acute on chronic nausea, likely gastroparesis worsened by viral infection. Started on remeron for mood/appetite/sleep. Tolerating diet with no more nausea. Stable for discharge to [facility] with close outpatient follow up. Insulin adjusted to due t hyperglycemia in ER. Patient will follow up with his outpatient MDs.   ADMISSION DATE:  04-21-25    ---  FROM ADMISSION H+P:   HPI:  74-year-old white female  presently residing in rehab center with history of diabetes mellitus on insulin hypertension and hyperlipidemia who had developed vascular ulcers/gangrene left lower extremity a and he was evaluated as outpatient by vascular surgeon scheduled for surgery today and because of glucose being out of control was sent to the emergency department here at [place] for further evaluation care treatment and management as well as stabilization of patient from a metabolic and glucose standpoint.  Patient states that on a good day her sugar runs 180-200.  No recent fever chills nausea vomiting or diarrhea.  ED COURSE:  Vitals: T 97.7  F , 90 HR  ,  /47 , RR 16 , SpO2  100% on RA  Labs significant for: wbc 15.92, ,   Imaging: No acute infilitrate on chest x ray   EKG: NSR, qtc 430  Pt received: 10u admelog, zosyn 3.375 g, vanc 1g, LR 1L   (21 Apr 2025 12:55)      ---  HOSPITAL COURSE/PERTINENT LABS/PROCEDURES PERFORMED/PENDING TESTS:  Patient was admitted for uncontrolled dm1 with hyperglycemia and infected LLE wounds. Treated with intravenous zosyn. Wound culture showed Pseudomonas resistant to zosyn and antibiotics changed to ciprofloxacin for a 2 week course. Vascular evaluated patient and recommended no acute intervention, follow up when functional status better. Had COVID infection with no hypoxemia or symptoms. Had brief afib vs PAT - given embolic strokes in the past, recommended to follow up with cardlogy & GI Already on eliquis, started on beta blocker. Patient then complaining of acute on chronic nausea, likely gastroparesis worsened by viral infection. Started on remeron for mood/appetite/sleep. Tolerating diet with no more nausea. Requested palliative.   ADMISSION DATE:  04-21-25    ---  FROM ADMISSION H+P:   HPI:  74-year-old white female  presently residing in rehab center with history of diabetes mellitus on insulin hypertension and hyperlipidemia who had developed vascular ulcers/gangrene left lower extremity a and he was evaluated as outpatient by vascular surgeon scheduled for surgery today and because of glucose being out of control was sent to the emergency department here at [place] for further evaluation care treatment and management as well as stabilization of patient from a metabolic and glucose standpoint.  Patient states that on a good day her sugar runs 180-200.  No recent fever chills nausea vomiting or diarrhea.  ED COURSE:  Vitals: T 97.7  F , 90 HR  ,  /47 , RR 16 , SpO2  100% on RA  Labs significant for: wbc 15.92, ,   Imaging: No acute infilitrate on chest x ray   EKG: NSR, qtc 430  Pt received: 10u admelog, zosyn 3.375 g, vanc 1g, LR 1L   (21 Apr 2025 12:55)      ---  HOSPITAL COURSE/PERTINENT LABS/PROCEDURES PERFORMED/PENDING TESTS:  Patient was admitted for uncontrolled dm1 with hyperglycemia and infected LLE wounds. Treated with intravenous zosyn. Wound culture showed Pseudomonas resistant to zosyn and antibiotics changed to ciprofloxacin for a 2 week course. Vascular evaluated patient and recommended no acute intervention, follow up when functional status better. Had COVID infection with no hypoxemia or symptoms. Had brief afib vs PAT - given embolic strokes in the past, recommended to follow up with cardiology & GI Already on eliquis, started on beta blocker. Patient then complaining of acute on chronic nausea, likely gastroparesis worsened by viral infection. Started on remeron for mood/appetite/sleep. Tolerating diet with no more nausea. Requested palliative care.   ADMISSION DATE:  04-21-25    ---  FROM ADMISSION H+P:   HPI:  74-year-old white female  presently residing in rehab center with history of diabetes mellitus on insulin hypertension and hyperlipidemia who had developed vascular ulcers/gangrene left lower extremity a and he was evaluated as outpatient by vascular surgeon scheduled for surgery today and because of glucose being out of control was sent to the emergency department here at [place] for further evaluation care treatment and management as well as stabilization of patient from a metabolic and glucose standpoint.  Patient states that on a good day her sugar runs 180-200.  No recent fever chills nausea vomiting or diarrhea.  ED COURSE:  Vitals: T 97.7  F , 90 HR  ,  /47 , RR 16 , SpO2  100% on RA  Labs significant for: wbc 15.92, ,   Imaging: No acute infilitrate on chest x ray   EKG: NSR, qtc 430  Pt received: 10u admelog, zosyn 3.375 g, vanc 1g, LR 1L   (21 Apr 2025 12:55)      ---  HOSPITAL COURSE/PERTINENT LABS/PROCEDURES PERFORMED/PENDING TESTS:  Patient was admitted for uncontrolled dm1 with hyperglycemia and infected LLE wounds. Treated with intravenous zosyn. Wound culture showed Pseudomonas resistant to zosyn and antibiotics changed to ciprofloxacin for a 2 week course. Vascular evaluated patient and recommended no acute intervention, follow up when functional status better. Had COVID infection with no hypoxemia or symptoms. Had brief afib vs PAT - given embolic strokes in the past, recommended to follow up with cardiology & GI Already on eliquis, started on beta blocker. Patient then complaining of acute on chronic nausea, likely gastroparesis worsened by viral infection. Started on remeron for mood/appetite/sleep. Tolerating diet with no more nausea. Family requested palliative care.  Pt. was then placed on comfort measures only and has been accepted to inpatient hospice.      On day of discharge patient is in no distress.  Denies having pain.  Afebrile and hemodynamically stable.

## 2025-04-30 NOTE — PROGRESS NOTE ADULT - TREATMENT GUIDELINE COMMENT
DNR/DNI - no amputation - wants revascularization attempt
de escalate non essential medications  if able to take cardiac meds PO, will continue, however low threshhold to D/C if unable to take  start IV meds for symptoms, if present

## 2025-04-30 NOTE — PROGRESS NOTE ADULT - SUBJECTIVE AND OBJECTIVE BOX
[facility] -- Chikis Sanders Pannella, Patel, Savella, Goodger, Cohen  Office # [phone]    Follow Up:  Afib     Subjective/Observations: seen and examined, asleep, opens eyes when called by name, unable to provide meaningful information at this time, NAD, Tolerating room air.       REVIEW OF SYSTEMS: All other review of systems is negative unless indicated above  PAST MEDICAL & SURGICAL HISTORY:  DM (diabetes mellitus)      Multiple sclerosis      DM (diabetes mellitus)      HTN (hypertension)      No significant past surgical history        MEDICATIONS  (STANDING):  amLODIPine   Tablet 10 milliGRAM(s) Oral daily  apixaban 5 milliGRAM(s) Oral every 12 hours  aspirin  chewable 81 milliGRAM(s) Oral daily  atorvastatin 20 milliGRAM(s) Oral at bedtime  ciprofloxacin     Tablet 500 milliGRAM(s) Oral every 12 hours  dextrose 5%. 1000 milliLiter(s) (50 mL/Hr) IV Continuous <Continuous>  dextrose 5%. 1000 milliLiter(s) (100 mL/Hr) IV Continuous <Continuous>  dextrose 50% Injectable 25 Gram(s) IV Push once  dextrose 50% Injectable 12.5 Gram(s) IV Push once  dextrose 50% Injectable 25 Gram(s) IV Push once  donepezil 5 milliGRAM(s) Oral at bedtime  glucagon  Injectable 1 milliGRAM(s) IntraMuscular once  insulin glargine Injectable (LANTUS) 12 Unit(s) SubCutaneous before breakfast  insulin lispro (ADMELOG) corrective regimen sliding scale   SubCutaneous three times a day before meals  insulin lispro (ADMELOG) corrective regimen sliding scale   SubCutaneous at bedtime  lactobacillus acidophilus 1 Tablet(s) Oral three times a day with meals  lisinopril 40 milliGRAM(s) Oral daily  metoclopramide 5 milliGRAM(s) Oral three times a day  metoprolol tartrate 25 milliGRAM(s) Oral two times a day  mirtazapine 7.5 milliGRAM(s) Oral <User Schedule>  pantoprazole    Tablet 40 milliGRAM(s) Oral two times a day  polyethylene glycol 3350 17 Gram(s) Oral daily    MEDICATIONS  (PRN):  acetaminophen     Tablet .. 650 milliGRAM(s) Oral every 6 hours PRN Temp greater or equal to 38C (100.4F), Mild Pain (1 - 3)  dextrose Oral Gel 15 Gram(s) Oral once PRN Blood Glucose LESS THAN 70 milliGRAM(s)/deciliter  dextrose Oral Gel 15 Gram(s) Oral once PRN Blood Glucose LESS THAN 70 milliGRAM(s)/deciliter  melatonin 3 milliGRAM(s) Oral at bedtime PRN Insomnia  ondansetron Injectable 4 milliGRAM(s) IV Push every 6 hours PRN Nausea and/or Vomiting    Allergies    No Known Allergies    Intolerances      Vital Signs Last 24 Hrs  T(C): 36.9 (30 Apr 2025 04:15), Max: 37.1 (29 Apr 2025 20:04)  T(F): 98.4 (30 Apr 2025 04:15), Max: 98.7 (29 Apr 2025 20:04)  HR: 98 (30 Apr 2025 04:15) (66 - 98)  BP: 121/68 (30 Apr 2025 04:15) (114/69 - 127/67)  BP(mean): --  RR: 18 (30 Apr 2025 04:15) (17 - 18)  SpO2: 98% (30 Apr 2025 04:15) (98% - 100%)    Parameters below as of 30 Apr 2025 04:15  Patient On (Oxygen Delivery Method): room air      I&O's Summary    29 Apr 2025 07:01  -  30 Apr 2025 07:00  --------------------------------------------------------  IN: 90 mL / OUT: 425 mL / NET: -335 mL          TELE: Not on telemetry   PHYSICAL EXAM:  Constitutional: NAD  HEENT: Moist Mucous Membranes, Anicteric  Pulmonary: Non-labored, breath sounds diminished, No wheezing, rales or rhonchi  Cardiovascular: IRRR, S1 and S2, No murmurs, rubs, gallops or clicks  Gastrointestinal: Bowel Sounds present, soft, nontender.   Lymph: No peripheral edema. No lymphadenopathy.  Skin: No visible rashes or ulcers. + R foot dressing CDI   Psych:  Mood & affect appropriate  LABS: All Labs Reviewed:                        9.8    10.44 )-----------( 341      ( 29 Apr 2025 07:18 )             29.6                         9.8    10.44 )-----------( 299      ( 28 Apr 2025 07:20 )             32.1                         10.5   9.98  )-----------( 320      ( 27 Apr 2025 08:50 )             33.8     29 Apr 2025 07:18    145    |  111    |  11     ----------------------------<  122    3.7     |  26     |  0.45   28 Apr 2025 07:20    143    |  108    |  10     ----------------------------<  255    3.9     |  29     |  0.59   27 Apr 2025 08:50    143    |  112    |  12     ----------------------------<  218    5.1     |  27     |  0.57     Ca    8.4        29 Apr 2025 07:18  Ca    8.7        28 Apr 2025 07:20  Ca    9.0        27 Apr 2025 08:50  Phos  2.7       28 Apr 2025 07:20  Phos  2.9       27 Apr 2025 08:50  Mg     1.9       29 Apr 2025 07:18  Mg     2.0       28 Apr 2025 07:20  Mg     2.0       27 Apr 2025 08:50            12 Lead ECG:   Ventricular Rate 83 BPM    Atrial Rate 83 BPM    P-R Interval 114 ms    QRS Duration 74 ms    Q-T Interval 364 ms    QTC Calculation(Bazett) 427 ms    P Axis 96 degrees    R Axis 40 degrees    T Axis 83 degrees    Diagnosis Line Sinus rhythm with occasional premature ventricular complexes  Minimal voltage criteria for LVH, may be normal variant ( Sokolow-Riley )  Nonspecific ST and T wave abnormality  Abnormal ECG  When compared with ECG of 24-APR-2025 10:08,  premature ventricular complexes arenow present  Confirmed by Keren Watson ([id number]) on 4/27/2025 6:44:47 AM (04-26-25 @ 13:14)      TRANSTHORACIC ECHOCARDIOGRAM REPORT  ________________________________________________________________________________                                      _______       Pt. Name:       TERESA MCHUGH Study Date:    4/24/2025  MRN:            [id number]       YOB: 1950  Accession #:    [id number]       Age:           74 years  Account#:       [id number]      Gender:        F  Heart Rate:                     Height:        62.99 in (160.00 cm)  Rhythm:                         Weight:        125.66 lb (57.00 kg)  Blood Pressure: 163/69 mmHg     BSA/BMI:       1.59 m² / 22.27 kg/m²  ________________________________________________________________________________________  Referring Physician:    [id number] Navid Plummer  Interpreting Physician: Lopez Cano M.D.  Primary Sonographer:    Agustin Quesada    CPT:               ECHO TTE WO CON COMP W DOPP - 68883.m  Indication(s):     Unspecified atrial fibrillation - I48.91  Procedure:         Transthoracic echocardiogram with 2-D, M-mode and complete                     spectral and color flow Doppler.  Ordering Location: [facility]  Admission Status:  Inpatient  Study Information: Image quality for this study is technically difficult.    _______________________________________________________________________________________     CONCLUSIONS:      1. Technically difficult image quality.   2. Left ventricular cavity is normal in size. Left ventricular systolic function is normal with an ejection fraction of 64 % by Pond's method of disks.   3. Normal right ventricular cavity size and normal right ventricular systolic function.   4. Mild mitral regurgitation.   5. Mild tricuspid regurgitation.   6. There is focal calcification of the aortic valve leaflets.   7. Estimated pulmonary artery systolic pressure is 29 mmHg.   8. There is mild (grade 1) left ventricular diastolic dysfunction.    ________________________________________________________________________________________  FINDINGS:     Left Ventricle:  The left ventricular cavity is normal in size. Left ventricular systolic function is normal with a calculated ejection fraction of 64 % by the Pond's biplane method of disks. There is normal LV mass and concentric remodeling. There is mild (grade 1) left ventricular diastolic dysfunction.     Right Ventricle:  The right ventricular cavity is normal in size and right ventricular systolic function is normal. Tricuspid annular plane systolic excursion (TAPSE) is 2.0 cm (normal >=1.7 cm).     Left Atrium:  The left atrium is normal in size.     Right Atrium:  The right atrium is normal in size with an indexed volume of 13.61 ml/m² and an indexed area of 6.36 cm²/m².     Interatrial Septum:  The interatrial septum appears intact.     Aortic Valve:  The aortic valve appears trileaflet with normal systolic excursion. There is focal calcification of the aortic valve leaflets. There is fibrocalcific aortic valve sclerosis without stenosis. There is no evidence of aortic regurgitation.     Mitral Valve:  There is mitral valve thickening of the anterior and posterior leaflets. There is calcification of the mitral valve annulus. There is mild mitral regurgitation.     Tricuspid Valve:  Structurally normal tricuspid valve with normal leaflet excursion. Thereis mild tricuspid regurgitation. Estimated pulmonary artery systolic pressure is 29 mmHg.     Pulmonic Valve:  The pulmonic valve was not well visualized.     Aorta:  The aortic root at the sinuses of Valsalva is normal in size, measuring 2.90 cm (indexed 1.83 cm/m²). The ascending aorta is normal in size, measuring 2.80 cm (indexed 1.76 cm/m²). The aortic arch diameter is normal in size, measuring 2.6 cm (indexed 1.64 cm/m²).     Pericardium:  No pericardial effusion seen.     Systemic Veins:  The inferior vena cava is normal in size measuring 1.14 cm in diameter, (normal <2.1cm) with normal inspiratory collapse (normal >50%) consistent with normal right atrial pressure (~3, range 0-5mmHg).  ____________________________________________________________________  QUANTITATIVE DATA:  Left Ventricle Measurements: (Indexed to BSA)     IVSd (2D):   1.1 cm  LVPWd (2D):  1.0 cm  LVIDd (2D):  3.9 cm  LVIDs (2D):  2.6 cm  LV Mass:     139 g  87.8 g/m²  LV Vol d, MOD A2C: 47.2 ml 29.74 ml/m²  LV Vold, MOD A4C: 53.1 ml 33.46 ml/m²  LV Vol d, MOD BP:  53.1 ml 33.44 ml/m²  LV Vol s, MOD A2C: 17.8 ml 11.22 ml/m²  LV Vol s, MOD A4C: 19.7 ml 12.41 ml/m²  LV Vol s, MOD BP:  19.3 ml 12.18 ml/m²  LVOT SV MOD BP:    33.7 ml  LV EF% MOD BP:     64 %     MV E Vmax:    0.98 m/s  MV A Vmax:    1.35 m/s  MV E/A:       0.73  e' lateral:   8.81 cm/s  e' medial:    9.79 cm/s  E/e' lateral: 11.12  E/e' medial:  10.01  E/e' Average: 10.54  MV DT:        218 msec    Aorta Measurements: (Normal range) (Indexed to BSA)     Ao Root d     2.90 cm (2.7 - 3.3 cm) 1.83 cm/m²  Ao Asc d, 2D: 2.80  Ao Asc prox:  2.80 cm                1.76 cm/m²  Ao Arch:      2.6 cm            Left Atrium Measurements: (Indexed to BSA)  LA Diam 2D: 2.90 cm         Right Ventricle Measurements: Right Atrial Measurements:     TAPSE:           2.0 cm       RA Vol s, MOD A4C         21.6 ml  RV Base (RVID1): 3.6 cm       RA Vol s, MOD A4C i BSA   13.61 ml/m²  RV Mid (RVID2):  2.7 cm       RA Area s, MOD A4C        10.1 cm²                       RA Area s, MOD A4C, i BSA 6.36 cm²/m²       LVOT / RVOT/ Qp/Qs Data: (Indexed to BSA)  LVOT Diameter,s: 1.90 cm  LVOT Area:       2.84 cm²  LVOT Vmax:       1.16 m/s  LVOT Vmn:        0.749 m/s  LVOT VTI:        22.30 cm  LVOT peak grad:  5 mmHg  LVOT mean grad:  2.0 mmHg  LVOT SV:         63.2 ml   39.84 ml/m²    Aortic Valve Measurements:  AV Vmax:                1.2 m/s  AV Peak Gradient:       6.1 mmHg  AV Mean Gradient:       3.0 mmHg  AV VTI:                 24.4 cm  AVVTI Ratio:           0.91  AoV EOA, Contin:        2.59 cm²  AoV EOA, Contin i:      1.63 cm²/m²  AoV area, (CE):         2.59 cm²  AoV area, (CE), i:      1.63 cm²/m²  AoV Dimensionless Index 0.91    Mitral Valve Measurements:     MV E Vmax: 1.0 m/s        MR Vmax:          2.67 m/s  MV A Vmax: 1.4 m/s         MR Peak Gradient: 28.5 mmHg  MV E/A:    0.7       Tricuspid Valve Measurements:     TR Vmax:          2.6 m/s  TR Peak Gradient: 26.0 mmHg  RA Pressure:      3 mmHg  PASP:             29 mmHg    ________________________________________________________________________________________  Electronically signed on 4/24/2025 at 2:29:29 PM by Lopez Cano M.D.         *** Final ***

## 2025-05-01 ENCOUNTER — TRANSCRIPTION ENCOUNTER (OUTPATIENT)
Age: 75
End: 2025-05-01

## 2025-05-01 VITALS
RESPIRATION RATE: 18 BRPM | HEART RATE: 88 BPM | TEMPERATURE: 98 F | DIASTOLIC BLOOD PRESSURE: 75 MMHG | SYSTOLIC BLOOD PRESSURE: 148 MMHG | OXYGEN SATURATION: 97 %

## 2025-05-01 PROCEDURE — 99239 HOSP IP/OBS DSCHRG MGMT >30: CPT

## 2025-05-01 PROCEDURE — 99232 SBSQ HOSP IP/OBS MODERATE 35: CPT

## 2025-05-01 PROCEDURE — 99233 SBSQ HOSP IP/OBS HIGH 50: CPT

## 2025-05-01 RX ORDER — GLYCOPYRROLATE 0.2 MG/ML
0.2 INJECTION INTRAMUSCULAR; INTRAVENOUS
Qty: 0 | Refills: 0 | DISCHARGE
Start: 2025-05-01

## 2025-05-01 RX ORDER — HALOPERIDOL 10 MG/1
0.5 TABLET ORAL
Qty: 0 | Refills: 0 | DISCHARGE
Start: 2025-05-01

## 2025-05-01 RX ORDER — FUROSEMIDE 10 MG/ML
1 INJECTION INTRAMUSCULAR; INTRAVENOUS
Refills: 0 | DISCHARGE

## 2025-05-01 RX ORDER — INSULIN GLARGINE-YFGN 100 [IU]/ML
15 INJECTION, SOLUTION SUBCUTANEOUS
Refills: 0 | DISCHARGE

## 2025-05-01 RX ORDER — METOCLOPRAMIDE HCL 10 MG
5 TABLET ORAL
Qty: 0 | Refills: 0 | DISCHARGE
Start: 2025-05-01

## 2025-05-01 RX ORDER — ASPIRIN 325 MG
1 TABLET ORAL
Refills: 0 | DISCHARGE

## 2025-05-01 RX ORDER — ATORVASTATIN CALCIUM 80 MG/1
1 TABLET, FILM COATED ORAL
Refills: 0 | DISCHARGE

## 2025-05-01 RX ADMIN — Medication 1 MILLIGRAM(S): at 11:40

## 2025-05-01 RX ADMIN — AMLODIPINE BESYLATE 10 MILLIGRAM(S): 10 TABLET ORAL at 05:57

## 2025-05-01 RX ADMIN — LISINOPRIL 40 MILLIGRAM(S): 5 TABLET ORAL at 05:57

## 2025-05-01 RX ADMIN — INSULIN LISPRO 1: 100 INJECTION, SOLUTION INTRAVENOUS; SUBCUTANEOUS at 08:42

## 2025-05-01 RX ADMIN — Medication 5 MILLIGRAM(S): at 05:58

## 2025-05-01 RX ADMIN — METOPROLOL SUCCINATE 25 MILLIGRAM(S): 50 TABLET, EXTENDED RELEASE ORAL at 05:57

## 2025-05-01 NOTE — PROGRESS NOTE ADULT - ASSESSMENT
74-year-old white female  presently residing in rehab center with history of diabetes mellitus on insulin hypertension and hyperlipidemia who had developed vascular ulcers/gangrene left lower extremity a and he was evaluated as outpatient by vascular surgeon scheduled for surgery today and because of glucose being out of control was sent to the emergency department here at [place] for further evaluation care treatment and management as well as stabilization of patient from a metabolic and glucose standpoint. Palliative consulted to assist with goals of care given high risk of limb loss in a patient with significant debility.

## 2025-05-01 NOTE — PROGRESS NOTE ADULT - PROVIDER SPECIALTY LIST ADULT
Endocrinology
Endocrinology
Hospitalist
Infectious Disease
Nephrology
Palliative Care
Vascular Surgery
Cardiology
Cardiology
Endocrinology
Hospitalist
Infectious Disease
Nephrology
Nephrology
Vascular Surgery
Cardiology
Hospitalist
Endocrinology
Gastroenterology
Gastroenterology
Hospitalist
Infectious Disease
Hospitalist
Infectious Disease
Endocrinology
Hospitalist
Internal Medicine
Diabetes
Hospitalist
Palliative Care

## 2025-05-01 NOTE — PROGRESS NOTE ADULT - PROBLEM SELECTOR PLAN 2
LE duplex with H/O DVT(previously on Eliquis)   Consult wound care, offload/turn and position  s/p vanc and zosyn in ED, c/w   consult ID, f/u   [place] exchange and urine culture  f/u BCx  Vascular Dr. Burden following  Will reschedule LLE once medically optimized  Pt is at high risk for limb loss due to large area of tissue loss and severe PAD
advanced, bed bound  poor mental status  c/w oral meds as tolerated, low threshhold to d/c as patient becoming more lethargic and may lose oral route  IV haldol PRN for uncontrolled agitation
advanced, bed bound  poor mental status  c/w oral meds as tolerated, low threshold to d/c as patient becoming more lethargic and may lose oral route  IV haldol PRN for uncontrolled agitation

## 2025-05-01 NOTE — PROGRESS NOTE ADULT - PROBLEM SELECTOR PLAN 4
chronic  c/w home ACE equivalent and amlodipine
DNR/DNI/CMO agreed upon by family, JUAN F updated 4/30  GOC above  inpt hospice referral    IV Morphine 1mg q6h ATC  IV Morphine 2mg q2h prn for dyspnea- goal RR <24  IV Morphine 2mg q2h prn for severe pain  IV Morphine 1mg q2h prn for moderate pain  IV haldol 0.5mg q6h prn for anxiety, agitation, refractory dyspnea  IV glycopyrrolate 0.2mg q6h prn for secretions  dulcolax NE PRN constipation, daily
DNR/DNI/CMO agreed upon by family, KADYST updated  GOC above    will start CMO in hospital with likely plan to transition to inpatient hospice when/if meets criteria    IV Morphine 2mg q2h prn for dyspnea- goal RR <24  IV Morphine 2mg q2h prn for severe pain  IV Morphine 1mg q2h prn for moderate pain  IV haldol 0.5mg q6h prn for anxiety, agitation, refractory dyspnea  IV glycopyrrolate 0.2mg q6h prn for secretions  dulcolax ND PRN constipation, daily

## 2025-05-01 NOTE — PROGRESS NOTE ADULT - PROBLEM SELECTOR PLAN 5
chronic  c/w home statin
discussed with family, Dr. Polanco, and RN manager. Message left for SW.  doc to doc complete. d/c planning    Nikki Jose MD, [facility]; Palliative Care Attending, [phone]
discussed with family, Dr. Polanco, and RN.    Nikki Jose MD, [facility]; Palliative Care Attending, [phone]

## 2025-05-01 NOTE — PROGRESS NOTE ADULT - PROBLEM SELECTOR PLAN 3
chronic  c/w asa and statin
c/w local wound care  turn and reposition q2
c/w local wound care  turn and reposition q2

## 2025-05-01 NOTE — PROGRESS NOTE ADULT - SUBJECTIVE AND OBJECTIVE BOX
[facility] -- Chikis Sanders Pannella, Patel, Savella, Goodger, Cohen: Office # [phone]    Follow Up:  Afib     Subjective/Observations: Patient seen and examined. Patient awake, alert, resting in bed. No complaints of chest pain, dyspnea, palpitations or dizziness. No signs of orthopnea or PND. Tolerating room air. Pt made  DNR/DNI/CMO agreed upon by family. Plan to transition to inpatient hospice.     REVIEW OF SYSTEMS: All other review of systems are negative unless indicated above    PAST MEDICAL & SURGICAL HISTORY:  DM (diabetes mellitus)      Multiple sclerosis      DM (diabetes mellitus)      HTN (hypertension)      No significant past surgical history    MEDICATIONS  (STANDING):  amLODIPine   Tablet 10 milliGRAM(s) Oral daily  donepezil 5 milliGRAM(s) Oral at bedtime  glucagon  Injectable 1 milliGRAM(s) IntraMuscular once  lisinopril 40 milliGRAM(s) Oral daily  metoclopramide Injectable 5 milliGRAM(s) IV Push three times a day  metoprolol tartrate 25 milliGRAM(s) Oral two times a day  mirtazapine 7.5 milliGRAM(s) Oral <User Schedule>  pantoprazole    Tablet 40 milliGRAM(s) Oral two times a day    MEDICATIONS  (PRN):  bisacodyl Suppository 10 milliGRAM(s) Rectal daily PRN Constipation  glycopyrrolate Injectable 0.2 milliGRAM(s) IV Push every 6 hours PRN secretions  haloperidol    Injectable 0.5 milliGRAM(s) IV Push every 6 hours PRN agitation  melatonin 3 milliGRAM(s) Oral at bedtime PRN Insomnia  morphine  - Injectable 2 milliGRAM(s) IV Push every 2 hours PRN Severe Pain (7 - 10)  morphine  - Injectable 2 milliGRAM(s) IV Push every 2 hours PRN dyspnea  morphine  - Injectable 1 milliGRAM(s) IV Push every 2 hours PRN Moderate Pain (4 - 6)  ondansetron Injectable 4 milliGRAM(s) IV Push every 6 hours PRN Nausea and/or Vomiting    Allergies    No Known Allergies    Intolerances      Vital Signs Last 24 Hrs  T(C): 36.8 (01 May 2025 04:53), Max: 36.8 (30 Apr 2025 12:37)  T(F): 98.3 (01 May 2025 04:53), Max: 98.3 (30 Apr 2025 12:37)  HR: 96 (01 May 2025 04:53) (80 - 100)  BP: 151/74 (01 May 2025 04:53) (116/69 - 151/74)  BP(mean): --  RR: 18 (01 May 2025 04:53) (18 - 18)  SpO2: 99% (01 May 2025 04:53) (95% - 99%)    Parameters below as of 01 May 2025 04:53  Patient On (Oxygen Delivery Method): room air      I&O's Summary    30 Apr 2025 07:01  -  01 May 2025 07:00  --------------------------------------------------------  IN: 0 mL / OUT: 300 mL / NET: -300 mL          TELE: Not on telemetry   PHYSICAL EXAM:  Constitutional: NAD, awake and alert  HEENT: Moist Mucous Membranes, Anicteric  Pulmonary: Non-labored, breath sounds are clear bilaterally, No wheezing, rales or rhonchi  Cardiovascular: Regular, S1 and S2, No murmurs, No rubs, gallops or clicks  Gastrointestinal:  soft, nontender, nondistended   Lymph: No peripheral edema. No lymphadenopathy.   Skin: No visible rashes or ulcers.  Psych:  Mood & affect appropriate      LABS: All Labs Reviewed:                        9.8    10.44 )-----------( 341      ( 29 Apr 2025 07:18 )             29.6     29 Apr 2025 07:18    145    |  111    |  11     ----------------------------<  122    3.7     |  26     |  0.45     Ca    8.4        29 Apr 2025 07:18  Mg     1.9       29 Apr 2025 07:18         12 Lead ECG:   Ventricular Rate 83 BPM    Atrial Rate 83 BPM    P-R Interval 114 ms    QRS Duration 74 ms    Q-T Interval 364 ms    QTC Calculation(Bazett) 427 ms    P Axis 96 degrees    R Axis 40 degrees    T Axis 83 degrees    Diagnosis Line Sinus rhythm with occasional premature ventricular complexes  Minimal voltage criteria for LVH, may be normal variant ( Sokolow-Riley )  Nonspecific ST and T wave abnormality  Abnormal ECG  When compared with ECG of 24-APR-2025 10:08,  premature ventricular complexes arenow present  Confirmed by Keren Watson ([id number]) on 4/27/2025 6:44:47 AM (04-26-25 @ 13:14)      TRANSTHORACIC ECHOCARDIOGRAM REPORT  ________________________________________________________________________________                                      _______       Pt. Name:       TERESA MCHUGH Study Date:    4/24/2025  MRN:            [id number]       YOB: 1950  Accession #:    [id number]       Age:           74 years  Account#:       [id number]      Gender:        F  Heart Rate:                     Height:        62.99 in (160.00 cm)  Rhythm:                         Weight:        125.66 lb (57.00 kg)  Blood Pressure: 163/69 mmHg     BSA/BMI:       1.59 m² / 22.27 kg/m²  ________________________________________________________________________________________  Referring Physician:    [id number] Navid Plummer  Interpreting Physician: Lopez Cano M.D.  Primary Sonographer:    Agustin Quesada    CPT:               ECHO TTE WO CON COMP W DOPP - 73010.m  Indication(s):     Unspecified atrial fibrillation - I48.91  Procedure:         Transthoracic echocardiogram with 2-D, M-mode and complete                     spectral and color flow Doppler.  Ordering Location: [facility]  Admission Status:  Inpatient  Study Information: Image quality for this study is technically difficult.    _______________________________________________________________________________________     CONCLUSIONS:      1. Technically difficult image quality.   2. Left ventricular cavity is normal in size. Left ventricular systolic function is normal with an ejection fraction of 64 % by Pond's method of disks.   3. Normal right ventricular cavity size and normal right ventricular systolic function.   4. Mild mitral regurgitation.   5. Mild tricuspid regurgitation.   6. There is focal calcification of the aortic valve leaflets.   7. Estimated pulmonary artery systolic pressure is 29 mmHg.   8. There is mild (grade 1) left ventricular diastolic dysfunction.    ________________________________________________________________________________________  FINDINGS:     Left Ventricle:  The left ventricular cavity is normal in size. Left ventricular systolic function is normal with a calculated ejection fraction of 64 % by the Pond's biplane method of disks. There is normal LV mass and concentric remodeling. There is mild (grade 1) left ventricular diastolic dysfunction.     Right Ventricle:  The right ventricular cavity is normal in size and right ventricular systolic function is normal. Tricuspid annular plane systolic excursion (TAPSE) is 2.0 cm (normal >=1.7 cm).     Left Atrium:  The left atrium is normal in size.     Right Atrium:  The right atrium is normal in size with an indexed volume of 13.61 ml/m² and an indexed area of 6.36 cm²/m².     Interatrial Septum:  The interatrial septum appears intact.     Aortic Valve:  The aortic valve appears trileaflet with normal systolic excursion. There is focal calcification of the aortic valve leaflets. There is fibrocalcific aortic valve sclerosis without stenosis. There is no evidence of aortic regurgitation.     Mitral Valve:  There is mitral valve thickening of the anterior and posterior leaflets. There is calcification of the mitral valve annulus. There is mild mitral regurgitation.     Tricuspid Valve:  Structurally normal tricuspid valve with normal leaflet excursion. Thereis mild tricuspid regurgitation. Estimated pulmonary artery systolic pressure is 29 mmHg.     Pulmonic Valve:  The pulmonic valve was not well visualized.     Aorta:  The aortic root at the sinuses of Valsalva is normal in size, measuring 2.90 cm (indexed 1.83 cm/m²). The ascending aorta is normal in size, measuring 2.80 cm (indexed 1.76 cm/m²). The aortic arch diameter is normal in size, measuring 2.6 cm (indexed 1.64 cm/m²).     Pericardium:  No pericardial effusion seen.     Systemic Veins:  The inferior vena cava is normal in size measuring 1.14 cm in diameter, (normal <2.1cm) with normal inspiratory collapse (normal >50%) consistent with normal right atrial pressure (~3, range 0-5mmHg).  ____________________________________________________________________  QUANTITATIVE DATA:  Left Ventricle Measurements: (Indexed to BSA)     IVSd (2D):   1.1 cm  LVPWd (2D):  1.0 cm  LVIDd (2D):  3.9 cm  LVIDs (2D):  2.6 cm  LV Mass:     139 g  87.8 g/m²  LV Vol d, MOD A2C: 47.2 ml 29.74 ml/m²  LV Vold, MOD A4C: 53.1 ml 33.46 ml/m²  LV Vol d, MOD BP:  53.1 ml 33.44 ml/m²  LV Vol s, MOD A2C: 17.8 ml 11.22 ml/m²  LV Vol s, MOD A4C: 19.7 ml 12.41 ml/m²  LV Vol s, MOD BP:  19.3 ml 12.18 ml/m²  LVOT SV MOD BP:    33.7 ml  LV EF% MOD BP:     64 %     MV E Vmax:    0.98 m/s  MV A Vmax:    1.35 m/s  MV E/A:       0.73  e' lateral:   8.81 cm/s  e' medial:    9.79 cm/s  E/e' lateral: 11.12  E/e' medial:  10.01  E/e' Average: 10.54  MV DT:        218 msec    Aorta Measurements: (Normal range) (Indexed to BSA)     Ao Root d     2.90 cm (2.7 - 3.3 cm) 1.83 cm/m²  Ao Asc d, 2D: 2.80  Ao Asc prox:  2.80 cm                1.76 cm/m²  Ao Arch:      2.6 cm            Left Atrium Measurements: (Indexed to BSA)  LA Diam 2D: 2.90 cm         Right Ventricle Measurements: Right Atrial Measurements:     TAPSE:           2.0 cm       RA Vol s, MOD A4C         21.6 ml  RV Base (RVID1): 3.6 cm       RA Vol s, MOD A4C i BSA   13.61 ml/m²  RV Mid (RVID2):  2.7 cm       RA Area s, MOD A4C        10.1 cm²                       RA Area s, MOD A4C, i BSA 6.36 cm²/m²       LVOT / RVOT/ Qp/Qs Data: (Indexed to BSA)  LVOT Diameter,s: 1.90 cm  LVOT Area:       2.84 cm²  LVOT Vmax:       1.16 m/s  LVOT Vmn:        0.749 m/s  LVOT VTI:        22.30 cm  LVOT peak grad:  5 mmHg  LVOT mean grad:  2.0 mmHg  LVOT SV:         63.2 ml   39.84 ml/m²    Aortic Valve Measurements:  AV Vmax:                1.2 m/s  AV Peak Gradient:       6.1 mmHg  AV Mean Gradient:       3.0 mmHg  AV VTI:                 24.4 cm  AVVTI Ratio:           0.91  AoV EOA, Contin:        2.59 cm²  AoV EOA, Contin i:      1.63 cm²/m²  AoV area, (CE):         2.59 cm²  AoV area, (CE), i:      1.63 cm²/m²  AoV Dimensionless Index 0.91    Mitral Valve Measurements:     MV E Vmax: 1.0 m/s        MR Vmax:          2.67 m/s  MV A Vmax: 1.4 m/s         MR Peak Gradient: 28.5 mmHg  MV E/A:    0.7       Tricuspid Valve Measurements:     TR Vmax:          2.6 m/s  TR Peak Gradient: 26.0 mmHg  RA Pressure:      3 mmHg  PASP:             29 mmHg    ________________________________________________________________________________________  Electronically signed on 4/24/2025 at 2:29:29 PM by Lopez Cano M.D.         *** Final ***

## 2025-05-01 NOTE — PROGRESS NOTE ADULT - SUBJECTIVE AND OBJECTIVE BOX
CHIEF COMPLAINT/INTERVAL HISTORY:  Pt. seen and evaluated for left LE ulcer and failure to thrive.  Pt. is in no distress.  Awake and denies having any pain or SOB.  On comfort measures.     REVIEW OF SYSTEMS:  No fever, CP, SOB, or abdominal pain     Vital Signs Last 24 Hrs  T(C): 36.8 (01 May 2025 04:53), Max: 36.8 (30 Apr 2025 12:37)  T(F): 98.3 (01 May 2025 04:53), Max: 98.3 (30 Apr 2025 12:37)  HR: 96 (01 May 2025 04:53) (80 - 100)  BP: 151/74 (01 May 2025 04:53) (116/69 - 151/74)  BP(mean): --  RR: 18 (01 May 2025 04:53) (18 - 18)  SpO2: 99% (01 May 2025 04:53) (95% - 99%)    Parameters below as of 01 May 2025 04:53  Patient On (Oxygen Delivery Method): room air        PHYSICAL EXAM:  GENERAL: NAD, frail  HEENT: EOMI, hearing normal, conjunctiva and sclera clear  Chest: CTA bilaterally, no wheezing  CV: S1S2, RRR,   GI: soft, +BS, NT/ND  Musculoskeletal: LLE wounds with dressing over foot  Psychiatric: flat affect  Skin: warm and dry

## 2025-05-01 NOTE — DISCHARGE NOTE NURSING/CASE MANAGEMENT/SOCIAL WORK - PATIENT PORTAL LINK FT
You can access the FollowMyHealth Patient Portal offered by [facility] by registering at the following website: [email or web address]. By joining [facility]’s FollowMyHealth portal, you will also be able to view your health information using other applications (apps) compatible with our system.

## 2025-05-01 NOTE — PROGRESS NOTE ADULT - CONVERSATION/DISCUSSION
Treatment Options
Hospice Referral
Diagnosis/Prognosis/MOLST Discussed/Treatment Options/Hospice Referral

## 2025-05-01 NOTE — PROGRESS NOTE ADULT - SUBJECTIVE AND OBJECTIVE BOX
Patient is being dc to hospice today, no renal objections, will no longer see, call with questions.

## 2025-05-01 NOTE — PROGRESS NOTE ADULT - NUTRITIONAL ASSESSMENT
This patient has been assessed with a concern for Malnutrition and has been determined to have a diagnosis/diagnoses of Moderate protein-calorie malnutrition.    This patient is being managed with:   Diet Consistent Carbohydrate w/Evening Snack-  Pureed (PUREED)  Mildly Thick Liquids (MILDTHICKLIQS)  Free Water Flush Instructions:  please give chocolate sugar free health shakes  Supplement Feeding Modality:  Oral  Health Shake No Sugar Cans or Servings Per Day:  1       Frequency:  Three Times a day  Entered: Apr 28 2025  8:58AM  

## 2025-05-01 NOTE — DISCHARGE NOTE NURSING/CASE MANAGEMENT/SOCIAL WORK - NSDCPEFALRISK_GEN_ALL_CORE
For information on Fall & Injury Prevention, visit: [email or web address] OR  [email or web address] OR  https://www.cdc.gov/steadi/patient.html

## 2025-05-01 NOTE — PROGRESS NOTE ADULT - PROBLEM SELECTOR PROBLEM 1
Type 1 diabetes mellitus with hyperglycemia
DM (diabetes mellitus)
Wound of foot
Wound of foot

## 2025-05-01 NOTE — PROGRESS NOTE ADULT - SUBJECTIVE AND OBJECTIVE BOX
Indication for Geriatrics and Palliative Care Services/INTERVAL HPI:  SUBJECTIVE AND OBJECTIVE:    OVERNIGHT EVENTS:    DNR on chart:DNI  DNI      Allergies    No Known Allergies    Intolerances    MEDICATIONS  (STANDING):  amLODIPine   Tablet 10 milliGRAM(s) Oral daily  donepezil 5 milliGRAM(s) Oral at bedtime  glucagon  Injectable 1 milliGRAM(s) IntraMuscular once  lisinopril 40 milliGRAM(s) Oral daily  metoclopramide Injectable 5 milliGRAM(s) IV Push three times a day  metoprolol tartrate 25 milliGRAM(s) Oral two times a day  mirtazapine 7.5 milliGRAM(s) Oral <User Schedule>  morphine  - Injectable 1 milliGRAM(s) IV Push every 6 hours  pantoprazole    Tablet 40 milliGRAM(s) Oral two times a day    MEDICATIONS  (PRN):  bisacodyl Suppository 10 milliGRAM(s) Rectal daily PRN Constipation  glycopyrrolate Injectable 0.2 milliGRAM(s) IV Push every 6 hours PRN secretions  haloperidol    Injectable 0.5 milliGRAM(s) IV Push every 6 hours PRN agitation  melatonin 3 milliGRAM(s) Oral at bedtime PRN Insomnia  morphine  - Injectable 2 milliGRAM(s) IV Push every 2 hours PRN Severe Pain (7 - 10)  morphine  - Injectable 2 milliGRAM(s) IV Push every 2 hours PRN dyspnea  morphine  - Injectable 1 milliGRAM(s) IV Push every 2 hours PRN Moderate Pain (4 - 6)  ondansetron Injectable 4 milliGRAM(s) IV Push every 6 hours PRN Nausea and/or Vomiting      ITEMS UNCHECKED ARE NOT PRESENT    PRESENT SYMPTOMS: [ ]Unable to self-report - see [ ] CPOT [ ] PAINADS [ ] RDOS  Source if other than patient:  [ ]Family   [ ]Team     Pain:  [ ]yes [ ]no  QOL impact -   Location -                    Aggravating factors -  Quality -  Radiation -  Timing-  Severity (0-10 scale):  Minimal acceptable level (0-10 scale):     CPOT:    https://www.[facility].org/getattachment/icw44o29-2k1r-5u8b-7s7o-1987v2286v4b/Critical-Care-Pain-Observation-Tool-(CPOT)    Dyspnea:                           [ ]Mild [ ]Moderate [ ]Severe  Anxiety:                             [ ]Mild [ ]Moderate [ ]Severe  Fatigue:                             [ ]Mild [ ]Moderate [ ]Severe  Nausea:                             [ ]Mild [ ]Moderate [ ]Severe  Loss of appetite:              [ ]Mild [ ]Moderate [ ]Severe  Constipation:                    [ ]Mild [ ]Moderate [ ]Severe    PCSSQ[Palliative Care Spiritual Screening Question]   Severity (0-10):  Score of 4 or > indicate consideration of Chaplaincy referral.  Chaplaincy Referral: [ ] yes [ ] refused [ ] following [ ] Deferred     Caregiver [place]? : [ ] yes [ ] no [ ] Deferred [ ] Declined             Social work referral [ ] Patient & Family Centered Care Referral [ ]     Anticipatory Grief present?:  [ ] yes [ ] no  [ ] Deferred                  Social work referral [ ] Chaplaincy Referral[ ]      Other Symptoms:  [ ]All other review of systems negative   [ ]Unable to obtain due to poor mentation    Palliative Performance Status Version 2:         %      [email or web address]  PHYSICAL EXAM:  Vital Signs Last 24 Hrs  T(C): 36.8 (01 May 2025 04:53), Max: 36.8 (30 Apr 2025 12:37)  T(F): 98.3 (01 May 2025 04:53), Max: 98.3 (30 Apr 2025 12:37)  HR: 96 (01 May 2025 04:53) (80 - 100)  BP: 151/74 (01 May 2025 04:53) (116/69 - 151/74)  BP(mean): --  RR: 18 (01 May 2025 04:53) (18 - 18)  SpO2: 99% (01 May 2025 04:53) (95% - 99%)    Parameters below as of 01 May 2025 04:53  Patient On (Oxygen Delivery Method): room air     I&O's Summary    30 Apr 2025 07:01  -  01 May 2025 07:00  --------------------------------------------------------  IN: 0 mL / OUT: 300 mL / NET: -300 mL       GENERAL: [ ]Cachexia    [ ]Alert  [ ]Oriented x   [ ]Lethargic  [ ]Unarousable  [ ]Verbal  [ ]Non-Verbal  Behavioral:   [ ]Anxiety  [ ]Delirium [ ]Agitation [ ]Other  HEENT:  [ ]Normal   [ ]Dry mouth   [ ]ET Tube/Trach  [ ]Oral lesions  PULMONARY:   [ ]Clear [ ]Tachypnea  [ ]Audible excessive secretions   [ ]Rhonchi        [ ]Right [ ]Left [ ]Bilateral  [ ]Crackles        [ ]Right [ ]Left [ ]Bilateral  [ ]Wheezing     [ ]Right [ ]Left [ ]Bilateral  [ ]Diminished BS [ ] Right [ ]Left [ ]Bilateral  CARDIOVASCULAR:    [ ]Regular [ ]Irregular [ ]Tachy  [ ]Sheldon [ ]Murmur [ ]Other  GASTROINTESTINAL:  [ ]Soft  [ ]Distended   [ ]+BS  [ ]Non tender [ ]Tender  [ ]Other [ ]PEG [ ]OGT/ NGT   Last BM:   GENITOURINARY:  [ ]Normal [ ]Incontinent   [ ]Oliguria/Anuria   [ ]Radford  MUSCULOSKELETAL:   [ ]Normal   [ ]Weakness  [ ]Bed/Wheelchair bound [ ]Edema  NEUROLOGIC:   [ ]No focal deficits  [ ] Cognitive impairment  [ ] Dysphagia [ ]Dysarthria [ ] Paresis [ ]Other   SKIN:   [ ]Normal  [ ]Rash  [ ]Other  [ ]Pressure ulcer(s) [ ]y [ ]n present on admission    CRITICAL CARE:  [ ]Shock Present  [ ]Septic [ ]Cardiogenic [ ]Neurologic [ ]Hypovolemic  [ ]Vasopressors [ ]Inotropes  [ ]Respiratory failure present [ ]Mechanical Ventilation [ ]Non-invasive ventilatory support [ ]High-Flow   [ ]Acute  [ ]Chronic [ ]Hypoxic  [ ]Hypercarbic [ ]Other  [ ]Other organ failure     LABS:            RADIOLOGY & ADDITIONAL STUDIES:    Protein Calorie Malnutrition Present: [ ]mild [ ]moderate [ ]severe [ ]underweight [ ]morbid obesity  https://www.andeal.org/vault/2440/web/files/ONC/Table_Clinical%20Characteristics%20to%20Document%20Malnutrition-White%20JV%20et%20al%202012.pdf    Height (cm): 160 (04-22-25 @ 08:34), 160 (04-21-25 @ 07:36)  Weight (kg): 56.7 (04-22-25 @ 08:34), 56.7 (04-21-25 @ 07:36)  BMI (kg/m2): 22.1 (04-22-25 @ 08:34), 22.1 (04-21-25 @ 07:36)    [ ]PPSV2 < or = 30%  [ ]significant weight loss [ ]poor nutritional intake [ ]anasarcaPrealbumin, Serum: 9 mg/dL (04-23-25 @ 06:45)  [ ]Artificial Nutrition    Other REFERRALS:  [ ]Hospice  [ ]Child Life  [ ]Social Work  [ ]Case management [ ]Holistic Therapy     Goals of Care Document: Indication for Geriatrics and Palliative Care Services/INTERVAL HPI: goals of care, hospice evaluation  SUBJECTIVE AND OBJECTIVE: Pt seen and evaluated; lethargic. goals remain for comfort directed care    OVERNIGHT EVENTS: no acute overnight events    DNR on chart:DNI  DNI      Allergies    No Known Allergies    Intolerances    MEDICATIONS  (STANDING):  amLODIPine   Tablet 10 milliGRAM(s) Oral daily  donepezil 5 milliGRAM(s) Oral at bedtime  glucagon  Injectable 1 milliGRAM(s) IntraMuscular once  lisinopril 40 milliGRAM(s) Oral daily  metoclopramide Injectable 5 milliGRAM(s) IV Push three times a day  metoprolol tartrate 25 milliGRAM(s) Oral two times a day  mirtazapine 7.5 milliGRAM(s) Oral <User Schedule>  morphine  - Injectable 1 milliGRAM(s) IV Push every 6 hours  pantoprazole    Tablet 40 milliGRAM(s) Oral two times a day    MEDICATIONS  (PRN):  bisacodyl Suppository 10 milliGRAM(s) Rectal daily PRN Constipation  glycopyrrolate Injectable 0.2 milliGRAM(s) IV Push every 6 hours PRN secretions  haloperidol    Injectable 0.5 milliGRAM(s) IV Push every 6 hours PRN agitation  melatonin 3 milliGRAM(s) Oral at bedtime PRN Insomnia  morphine  - Injectable 2 milliGRAM(s) IV Push every 2 hours PRN Severe Pain (7 - 10)  morphine  - Injectable 2 milliGRAM(s) IV Push every 2 hours PRN dyspnea  morphine  - Injectable 1 milliGRAM(s) IV Push every 2 hours PRN Moderate Pain (4 - 6)  ondansetron Injectable 4 milliGRAM(s) IV Push every 6 hours PRN Nausea and/or Vomiting      ITEMS UNCHECKED ARE NOT PRESENT    PRESENT SYMPTOMS: [x ]Unable to self-report - see [ ] CPOT [x ] PAINADS [x ] RDOS  Source if other than patient:  [ ]Family   [ ]Team     Pain:  [ ]yes [ ]no  QOL impact -   Location -                    Aggravating factors -  Quality -  Radiation -  Timing-  Severity (0-10 scale):  Minimal acceptable level (0-10 scale):     CPOT:    https://www.[facility].org/getattachment/[email or web address]-1j6e-[email or web address]/Critical-Care-Pain-Observation-Tool-(CPOT)    Dyspnea:                           [ ]Mild [ ]Moderate [ ]Severe  Anxiety:                             [ ]Mild [ ]Moderate [ ]Severe  Fatigue:                             [ ]Mild [ ]Moderate [ ]Severe  Nausea:                             [ ]Mild [ ]Moderate [ ]Severe  Loss of appetite:              [ ]Mild [ ]Moderate [ ]Severe  Constipation:                    [ ]Mild [ ]Moderate [ ]Severe    PCSSQ[Palliative Care Spiritual Screening Question]   Severity (0-10):  Score of 4 or > indicate consideration of Chaplaincy referral.  Chaplaincy Referral: [ ] yes [ ] refused [ ] following [ x] Deferred     Caregiver [place]? : [ ] yes [ x] no [ ] Deferred [ ] Declined             Social work referral [ ] Patient & Family Centered Care Referral [ ]     Anticipatory Grief present?:  [ ] yes [x] no  [ ] Deferred                  Social work referral [ ] Chaplaincy Referral[ ]      Other Symptoms:  [ ]All other review of systems negative   [ x]Unable to obtain due to poor mentation    Palliative Performance Status Version 2:      20   %      http://npcrc.org/files/news/palliative_performance_scale_[email or web address].pdf  PHYSICAL EXAM:  Vital Signs Last 24 Hrs  T(C): 36.8 (01 May 2025 04:53), Max: 36.8 (30 Apr 2025 12:37)  T(F): 98.3 (01 May 2025 04:53), Max: 98.3 (30 Apr 2025 12:37)  HR: 96 (01 May 2025 04:53) (80 - 100)  BP: 151/74 (01 May 2025 04:53) (116/69 - 151/74)  BP(mean): --  RR: 18 (01 May 2025 04:53) (18 - 18)  SpO2: 99% (01 May 2025 04:53) (95% - 99%)    Parameters below as of 01 May 2025 04:53  Patient On (Oxygen Delivery Method): room air     I&O's Summary    30 Apr 2025 07:01  -  01 May 2025 07:00  --------------------------------------------------------  IN: 0 mL / OUT: 300 mL / NET: -300 mL       GENERAL: [ ]Cachexia    [ ]Alert  [ ]Oriented x   [ x]Lethargic  [ ]Unarousable  [ ]Verbal  [ ]Non-Verbal  Behavioral:   [ ]Anxiety  [ ]Delirium [ ]Agitation [ ]Other  HEENT:  [ ]Normal   [ ]Dry mouth   [ ]ET Tube/Trach  [ ]Oral lesions  PULMONARY:   [ ]Clear [ ]Tachypnea  [ ]Audible excessive secretions   [ ]Rhonchi        [ ]Right [ ]Left [ ]Bilateral  [ ]Crackles        [ ]Right [ ]Left [ ]Bilateral  [ ]Wheezing     [ ]Right [ ]Left [ ]Bilateral  [ x]Diminished BS [ ] Right [ ]Left [ ]Bilateral  CARDIOVASCULAR:    [x ]Regular [ ]Irregular [ ]Tachy  [ ]Sheldon [ ]Murmur [ ]Other  GASTROINTESTINAL:  [x ]Soft  [ ]Distended   [x ]+BS  [ ]Non tender [ ]Tender  [ ]Other [ ]PEG [ ]OGT/ NGT   Last BM:   GENITOURINARY:  [ ]Normal [x ]Incontinent   [ ]Oliguria/Anuria   [ ]Radford  MUSCULOSKELETAL:   [ ]Normal   [x ]Weakness  [ ]Bed/Wheelchair bound [ ]Edema  NEUROLOGIC:   [ ]No focal deficits  [x ] Cognitive impairment  [ x] Dysphagia [ ]Dysarthria [ ] Paresis [ ]Other   SKIN:   [ ]Normal  [ ]Rash  [x ]Other  [ x]Pressure ulcer(s) [ x]y [ ]n present on admission    CRITICAL CARE:  [ ]Shock Present  [ ]Septic [ ]Cardiogenic [ ]Neurologic [ ]Hypovolemic  [ ]Vasopressors [ ]Inotropes  [ ]Respiratory failure present [ ]Mechanical Ventilation [ ]Non-invasive ventilatory support [ ]High-Flow   [ ]Acute  [ ]Chronic [ ]Hypoxic  [ ]Hypercarbic [ ]Other  [ ]Other organ failure     LABS: no new labs        RADIOLOGY & ADDITIONAL STUDIES: no new imaging    Protein Calorie Malnutrition Present: [ ]mild [ ]moderate [ ]severe [ ]underweight [ ]morbid obesity  https://www.andeal.org/vault/4370/web/files/ONC/Table_Clinical%20Characteristics%20to%20Document%20Malnutrition-White%20JV%20et%20al%202012.pdf    Height (cm): 160 (04-22-25 @ 08:34), 160 (04-21-25 @ 07:36)  Weight (kg): 56.7 (04-22-25 @ 08:34), 56.7 (04-21-25 @ 07:36)  BMI (kg/m2): 22.1 (04-22-25 @ 08:34), 22.1 (04-21-25 @ 07:36)    [x ]PPSV2 < or = 30%  [ ]significant weight loss [x ]poor nutritional intake [ ]anasarcaPrealbumin, Serum: 9 mg/dL (04-23-25 @ 06:45)  [ ]Artificial Nutrition    Other REFERRALS:  [ x]Hospice  [ ]Child Life  [x ]Social Work  [ ]Case management [ ]Holistic Therapy     Goals of Care Document:

## 2025-05-01 NOTE — PROGRESS NOTE ADULT - CONVERSATION DETAILS
Discussed GOC with son Thanh who also spoke to patient's . They report that patient is DNR/DNI. They would NOT want amputation of her leg, even if it were to save her life. They do however want the attempt of revascularization. Would want to treat current infection and optimize as possible. Son reports that vascular is offering revascularization attempt at a later point - to be reassessed in a few days. For now to continue medical management. Remains DNR/DNI
Outreach made to spouse Trevon, no answer, non urgent VM left   Outreach made to son, Thanh, discussed plan of care. We reviewed potential transfer to inpatient hospice and that no further blood draws/finger sticks (glucose checks), antibiotics or non beneficial medications would be continued. Son verbalized understanding and in agreement with inpatient hospice transition. doc to doc completed by this provider. RUPALI notified. Dr. Polanco notified. d/c planning
Spoke with son Thanh via phone per request. Reviewed clinical status. Thanh states family feels patient not getting better and doesn't want her to suffer. They want to prioritize her comfort and review options for end of life care. discussed comfort care including no further blood draws/finger sticks and deescalating non essential medications while improving management of symptoms such as pain, sob, anxiety, agitation, etc for end of life. reviewed options for hospice care including potential for inpatient hospice if needing IV meds for symptoms, which is medicare criteria to be a candidate. Thanh states he and his father are on the same patient but that spouse visiting patient    Met with spouse at bedside, reviewed clinical status and goals of care  spouse states he and his son spoke and he agrees with the above. Life review performed- patient and spouse  57 years, with 4 grandchildren, the youngest is about to leave for college soon.  tearful but appropriate and just wants patient to be at peace, mentioning her carlos with MS for >35 years. Reviewed the above regarding CMO in hospital and eventual transition to inpatient hospice, if qualifies. Contact information for our team provided, emotional support given. MOLST updated to reflect wishes.

## 2025-05-01 NOTE — PROGRESS NOTE ADULT - PROBLEM SELECTOR PROBLEM 3
PAD (peripheral artery disease)
Stage II pressure ulcer of sacral region
Stage II pressure ulcer of sacral region

## 2025-05-01 NOTE — PROGRESS NOTE ADULT - PROBLEM SELECTOR PLAN 1
cont lantus 15 units qam  standing pre-meal admelog d/renny (hypoglycemia)  cont low dose admelog corrective scale coverage qac/qhs  cont cons cho diet  goal bg 100-180 in hosp setting
cont lantus 15 units qam  cont admelog 5 units 3x/day before meals  change low dose admelog corrective scale coverage qac/qhs  cont cons cho diet  goal bg 100-180 in hosp setting
comfort care measures initiated  insulin d/renny  will sign off
cont lantus 15 units qam  cont low dose admelog corrective scale coverage qac/qhs  pre-meal admelog 5 units tid held yesterday in light of diminished po intake  bg goal 100-180 in hosp setting
lantus decreased 10 units qam (hypoglycemia)  cont low dose admelog corrective scale coverage qac/qhs  cont cons cho diet  goal bg 100-180 in hosp setting
no plan for vascular intervention  start morphine PRN for pain management  no further antibiotics  continue with local wound care
Type 1 A1c 7.8% adm hyperglycemia  pt had hypoglycemia, poor PO intake  hold premeal admelog  c/w admelog mod ISS ACHS  c/w lantus 15 units in AM  Recommend endocrine-Perlman onconsult  FU appt: TBA  DSC recommendations: pending clinical course, return to rehab regimen and glucose monitoring  diabetes education provided as documented above  Diabetes support info and cell # [phone] given   Goal 100-180 mg/dL; 140-180 mg/dL in critical care areas
Presented hyperglycemic, glucose in the 500s  s/p 10 u insulin in ED, s/p 1L LR in ED, glucose DT  on IVF  on LDISS  Endo consult Dr. Campbell  soft bite sized CC diet
no plan for vascular intervention  start morphine ATC for pain management- grimacing with wound care and examination  no further antibiotics  continue with local wound care

## 2025-05-01 NOTE — CAREGIVER ENGAGEMENT NOTE - CAREGIVER OUTREACH NOTES - FREE TEXT
Per MD, pt stable for dc to hospice [facility]. SW confirmed with [facility] that they have a bed for today and requested 12pm . SW requested nancy via [facility] for 12pm. SW contacted spouse Trevon regarding dc planning. He is accepting bed for today to hospice [facility]. SW to follow and remain available for any needs.

## 2025-05-01 NOTE — DISCHARGE NOTE NURSING/CASE MANAGEMENT/SOCIAL WORK - FINANCIAL ASSISTANCE
[facility] provides services at a reduced cost to those who are determined to be eligible through [facility]’s financial assistance program. Information regarding [facility]’s financial assistance program can be found by going to [email or web address] or by calling [phone].

## 2025-05-01 NOTE — PROGRESS NOTE ADULT - REASON FOR ADMISSION
Hyperglycemia

## 2025-05-01 NOTE — PROGRESS NOTE ADULT - ASSESSMENT
74-year-old white female  presently residing in rehab center with history of diabetes mellitus on insulin hypertension and hyperlipidemia who had developed vascular ulcers/gangrene left lower extremity, admitted for infection, consulted for new onset Afib     - Presented with fever, Covid PNA. Called for ? new onset A fib   - EKG (4/23) Possible A fib vs PAT and while on tele was SR brief PAT,  - Holter Monitor 8/2022: Sinus with PACs, no significant events   - Continue Eliquis for Embolic CVA   - BP stable and controlled per flow sheet   - Continue BB, CCB, ACEi    - EKG: Afib vs  bpm   - No evidence of any active ischemia   - Stress Testing (8/2022) Normal study, no EKG changes or chest pain, LVEF 65%     - Technically difficult ECHO showed normal LV & RV size and function EF 64%, Mild MR and TR, Grade 1 DD  - No evidence of any meaningful volume overload   - Non orthopneic on room air     - Pt made  DNR/DNI/CMO agreed upon by family. Plan to transition to inpatient hospice.   - Will sign off given comfort measures only   - Reconsult if needed     Heather Clark, MS FNP, AGACNP  Nurse Practitioner- Cardiology   Please call on TEAMS

## 2025-05-01 NOTE — PATIENT CHOICE NOTE. - NSPTCHOICESTATE_GEN_ALL_CORE

## 2025-05-01 NOTE — PROGRESS NOTE ADULT - ASSESSMENT
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 Found to have paroxysmal atrial fibrillation, now back in SR.    #Infected LLE Ulcers  #Cellulitis/SSTI  #PAD  - wound cx with pseudomonas - sensitivities noted - resistant to zosyn - changed to ciprofloxacin - QTC noted - no plans for vascular intervention.  Now off Cipro.    - sepsis POA (evolving) secondary to SSTI & COVID - now resolved  - BCx negative. UCx with candida tropicalis  - per vascular- no acute intervention now  - ID following Dr. Huitron/Blayne  - Vascular following Dr. Burden  - Family requesting palliative care with interest in hospice.   - Pt. on comfort measures only.    #Nausea  #Failure to thrive/Malnutrition  #Depression  - per  report - nausea is not new - has been dealing with this for a while  -  reports patient takes a pill before eating  - suspect pt with gastroparesis and now with worsening symptoms due to COVID  - will trial some standing reglan + PPI BID -->improved  - downgrade diet to pureed with mildly thickened liquids - SLP evaluation noted  - continue nutrition supplements  - will continue remeron    #COVID infection  - no role for remdesivir/decadron  - supportive care  - on room air with SpO2 in upper 90th% to 100%  - ID following    #Atrial fibrillation, now in SR  #Paroxsymal Atrial Tachycardia  - Afib with RVR vs PAT 4/23 AM likely reactive secondary to sepsis  - Reviewed initial EKG from 4/23 - d/w cardiology 4/25 - question if p wave present so possible this was PAT but cannot rule out atrial fibrillation  - unfortunately was placed on telemetry monitoring hours after initial EKG - has been SR since start of telemetry monitoring - regardless, has been having bursts of tachycardia & already on AC for history of embolic CVA, so will treat as such given current information & clinical status  - patient without documented history of afib although per outpatient review, had embolic CVA with suspected cardiogenic etiology  - suspect patient with paroxysmal atrial fibrillation with extreme stress i.e 4/23 with fever & sepsis  - off home apixaban  - continue metoprolol 25mg PO BID  - TTE reviewed - normal EF, mild valvular regurgitations, mild DD  - Pt. on comfort measures only  - cardiology consult Dr. Lanza appreciated    #DM1, uncontrolled  - A1C 7.8  - had hypoglycemia due to poor PO  - continue low dose insulin corrective scale  - monitor BG, hypoglycemia protocol    #Electrolyte abnormality  - improved  - on comfort measures only    #HTN  - BP well controlled  - continue home dose amlodipine 10mg daily  - continue lisinopril 40mg  - continue metoprolol 25mg PO BID  - hold lasix for now  - On comfort measures only    #History of embolic CVA  - On comfort measures    #Alzheimer Dementia  - continue donepezil    #Prophylactic Measure  - VTE ppx: On comfort measures    GOC: DNR/DNI

## 2025-06-02 PROCEDURE — 85652 RBC SED RATE AUTOMATED: CPT

## 2025-06-02 PROCEDURE — 93970 EXTREMITY STUDY: CPT

## 2025-06-02 PROCEDURE — 83036 HEMOGLOBIN GLYCOSYLATED A1C: CPT

## 2025-06-02 PROCEDURE — 87186 SC STD MICRODIL/AGAR DIL: CPT

## 2025-06-02 PROCEDURE — 83735 ASSAY OF MAGNESIUM: CPT

## 2025-06-02 PROCEDURE — 92610 EVALUATE SWALLOWING FUNCTION: CPT

## 2025-06-02 PROCEDURE — 84134 ASSAY OF PREALBUMIN: CPT

## 2025-06-02 PROCEDURE — 85027 COMPLETE CBC AUTOMATED: CPT

## 2025-06-02 PROCEDURE — 93306 TTE W/DOPPLER COMPLETE: CPT

## 2025-06-02 PROCEDURE — 81001 URINALYSIS AUTO W/SCOPE: CPT

## 2025-06-02 PROCEDURE — 82009 KETONE BODYS QUAL: CPT

## 2025-06-02 PROCEDURE — 87040 BLOOD CULTURE FOR BACTERIA: CPT

## 2025-06-02 PROCEDURE — 96374 THER/PROPH/DIAG INJ IV PUSH: CPT

## 2025-06-02 PROCEDURE — 80053 COMPREHEN METABOLIC PANEL: CPT

## 2025-06-02 PROCEDURE — 99285 EMERGENCY DEPT VISIT HI MDM: CPT | Mod: 25

## 2025-06-02 PROCEDURE — 86901 BLOOD TYPING SEROLOGIC RH(D): CPT

## 2025-06-02 PROCEDURE — 97162 PT EVAL MOD COMPLEX 30 MIN: CPT

## 2025-06-02 PROCEDURE — 87077 CULTURE AEROBIC IDENTIFY: CPT

## 2025-06-02 PROCEDURE — 87070 CULTURE OTHR SPECIMN AEROBIC: CPT

## 2025-06-02 PROCEDURE — 87640 STAPH A DNA AMP PROBE: CPT

## 2025-06-02 PROCEDURE — 84100 ASSAY OF PHOSPHORUS: CPT

## 2025-06-02 PROCEDURE — 87086 URINE CULTURE/COLONY COUNT: CPT

## 2025-06-02 PROCEDURE — 96375 TX/PRO/DX INJ NEW DRUG ADDON: CPT

## 2025-06-02 PROCEDURE — 86900 BLOOD TYPING SEROLOGIC ABO: CPT

## 2025-06-02 PROCEDURE — 84443 ASSAY THYROID STIM HORMONE: CPT

## 2025-06-02 PROCEDURE — 71045 X-RAY EXAM CHEST 1 VIEW: CPT

## 2025-06-02 PROCEDURE — 85025 COMPLETE CBC W/AUTO DIFF WBC: CPT

## 2025-06-02 PROCEDURE — 80048 BASIC METABOLIC PNL TOTAL CA: CPT

## 2025-06-02 PROCEDURE — 93005 ELECTROCARDIOGRAM TRACING: CPT

## 2025-06-02 PROCEDURE — 97530 THERAPEUTIC ACTIVITIES: CPT

## 2025-06-02 PROCEDURE — 86850 RBC ANTIBODY SCREEN: CPT

## 2025-06-02 PROCEDURE — 82962 GLUCOSE BLOOD TEST: CPT

## 2025-06-02 PROCEDURE — 87641 MR-STAPH DNA AMP PROBE: CPT

## 2025-06-02 PROCEDURE — 86140 C-REACTIVE PROTEIN: CPT

## 2025-06-02 PROCEDURE — 36415 COLL VENOUS BLD VENIPUNCTURE: CPT

## 2025-06-02 PROCEDURE — 83605 ASSAY OF LACTIC ACID: CPT

## 2025-06-02 PROCEDURE — 87635 SARS-COV-2 COVID-19 AMP PRB: CPT
